# Patient Record
Sex: MALE | Race: WHITE | NOT HISPANIC OR LATINO
[De-identification: names, ages, dates, MRNs, and addresses within clinical notes are randomized per-mention and may not be internally consistent; named-entity substitution may affect disease eponyms.]

---

## 2017-04-18 ENCOUNTER — APPOINTMENT (OUTPATIENT)
Dept: ELECTROPHYSIOLOGY | Facility: CLINIC | Age: 82
End: 2017-04-18

## 2017-05-19 ENCOUNTER — APPOINTMENT (OUTPATIENT)
Dept: ELECTROPHYSIOLOGY | Facility: CLINIC | Age: 82
End: 2017-05-19

## 2017-05-19 ENCOUNTER — NON-APPOINTMENT (OUTPATIENT)
Age: 82
End: 2017-05-19

## 2017-05-19 VITALS
OXYGEN SATURATION: 95 % | DIASTOLIC BLOOD PRESSURE: 72 MMHG | HEART RATE: 85 BPM | HEIGHT: 72 IN | SYSTOLIC BLOOD PRESSURE: 125 MMHG | BODY MASS INDEX: 27.77 KG/M2 | WEIGHT: 205 LBS

## 2017-05-19 DIAGNOSIS — N40.0 BENIGN PROSTATIC HYPERPLASIA WITHOUT LOWER URINARY TRACT SYMPMS: ICD-10-CM

## 2017-05-19 DIAGNOSIS — K21.9 GASTRO-ESOPHAGEAL REFLUX DISEASE W/OUT ESOPHAGITIS: ICD-10-CM

## 2017-05-19 DIAGNOSIS — Z86.39 PERSONAL HISTORY OF OTHER ENDOCRINE, NUTRITIONAL AND METABOLIC DISEASE: ICD-10-CM

## 2017-05-19 DIAGNOSIS — R06.09 OTHER FORMS OF DYSPNEA: ICD-10-CM

## 2017-05-19 DIAGNOSIS — M19.90 UNSPECIFIED OSTEOARTHRITIS, UNSPECIFIED SITE: ICD-10-CM

## 2017-05-19 DIAGNOSIS — I48.1 PERSISTENT ATRIAL FIBRILLATION: ICD-10-CM

## 2017-05-19 RX ORDER — PANTOPRAZOLE SODIUM 40 MG/1
40 TABLET, DELAYED RELEASE ORAL DAILY
Qty: 30 | Refills: 0 | Status: ACTIVE | COMMUNITY
Start: 2017-05-19

## 2017-05-19 RX ORDER — MULTIVIT-MIN/FA/LYCOPEN/LUTEIN .4-300-25
TABLET ORAL DAILY
Qty: 30 | Refills: 0 | Status: ACTIVE | COMMUNITY
Start: 2017-05-19

## 2017-05-19 RX ORDER — ALBUTEROL SULFATE 90 UG/1
108 (90 BASE) AEROSOL, METERED RESPIRATORY (INHALATION)
Qty: 30 | Refills: 0 | Status: ACTIVE | COMMUNITY
Start: 2017-05-19

## 2017-05-19 RX ORDER — NITROGLYCERIN 0.4 MG/1
0.4 TABLET SUBLINGUAL
Qty: 30 | Refills: 0 | Status: ACTIVE | COMMUNITY
Start: 2017-05-19

## 2017-06-28 ENCOUNTER — OTHER (OUTPATIENT)
Age: 82
End: 2017-06-28

## 2017-08-16 ENCOUNTER — APPOINTMENT (OUTPATIENT)
Dept: CV DIAGNOSITCS | Facility: HOSPITAL | Age: 82
End: 2017-08-16
Payer: MEDICARE

## 2017-08-16 ENCOUNTER — OUTPATIENT (OUTPATIENT)
Dept: OUTPATIENT SERVICES | Facility: HOSPITAL | Age: 82
LOS: 1 days | End: 2017-08-16

## 2017-08-16 ENCOUNTER — APPOINTMENT (OUTPATIENT)
Dept: ELECTROPHYSIOLOGY | Facility: CLINIC | Age: 82
End: 2017-08-16
Payer: MEDICARE

## 2017-08-16 DIAGNOSIS — Z95.1 PRESENCE OF AORTOCORONARY BYPASS GRAFT: Chronic | ICD-10-CM

## 2017-08-16 DIAGNOSIS — Z98.61 CORONARY ANGIOPLASTY STATUS: Chronic | ICD-10-CM

## 2017-08-16 DIAGNOSIS — I48.1 PERSISTENT ATRIAL FIBRILLATION: ICD-10-CM

## 2017-08-16 PROCEDURE — 93312 ECHO TRANSESOPHAGEAL: CPT | Mod: 26

## 2017-08-16 PROCEDURE — 76376 3D RENDER W/INTRP POSTPROCES: CPT | Mod: 26

## 2017-08-16 PROCEDURE — 93306 TTE W/DOPPLER COMPLETE: CPT | Mod: 26

## 2017-08-16 PROCEDURE — 36415 COLL VENOUS BLD VENIPUNCTURE: CPT

## 2017-08-23 ENCOUNTER — INPATIENT (INPATIENT)
Facility: HOSPITAL | Age: 82
LOS: 0 days | Discharge: HOME | DRG: 274 | End: 2017-08-24
Attending: INTERNAL MEDICINE | Admitting: INTERNAL MEDICINE
Payer: MEDICARE

## 2017-08-23 ENCOUNTER — APPOINTMENT (OUTPATIENT)
Dept: CV DIAGNOSITCS | Facility: HOSPITAL | Age: 82
End: 2017-08-23

## 2017-08-23 VITALS
TEMPERATURE: 98 F | DIASTOLIC BLOOD PRESSURE: 72 MMHG | OXYGEN SATURATION: 97 % | SYSTOLIC BLOOD PRESSURE: 132 MMHG | RESPIRATION RATE: 16 BRPM | HEART RATE: 83 BPM | WEIGHT: 210.1 LBS | HEIGHT: 73 IN

## 2017-08-23 DIAGNOSIS — Z95.5 PRESENCE OF CORONARY ANGIOPLASTY IMPLANT AND GRAFT: Chronic | ICD-10-CM

## 2017-08-23 DIAGNOSIS — I48.1 PERSISTENT ATRIAL FIBRILLATION: ICD-10-CM

## 2017-08-23 DIAGNOSIS — Z90.49 ACQUIRED ABSENCE OF OTHER SPECIFIED PARTS OF DIGESTIVE TRACT: Chronic | ICD-10-CM

## 2017-08-23 DIAGNOSIS — Z98.61 CORONARY ANGIOPLASTY STATUS: Chronic | ICD-10-CM

## 2017-08-23 DIAGNOSIS — Z98.890 OTHER SPECIFIED POSTPROCEDURAL STATES: Chronic | ICD-10-CM

## 2017-08-23 DIAGNOSIS — Z95.1 PRESENCE OF AORTOCORONARY BYPASS GRAFT: Chronic | ICD-10-CM

## 2017-08-23 DIAGNOSIS — I49.9 CARDIAC ARRHYTHMIA, UNSPECIFIED: ICD-10-CM

## 2017-08-23 DIAGNOSIS — Z95.0 PRESENCE OF CARDIAC PACEMAKER: Chronic | ICD-10-CM

## 2017-08-23 DIAGNOSIS — E11.9 TYPE 2 DIABETES MELLITUS WITHOUT COMPLICATIONS: ICD-10-CM

## 2017-08-23 LAB
ALBUMIN SERPL ELPH-MCNC: 4.1 G/DL — SIGNIFICANT CHANGE UP (ref 3.3–5)
ALP SERPL-CCNC: 61 U/L — SIGNIFICANT CHANGE UP (ref 40–120)
ALT FLD-CCNC: 11 U/L RC — SIGNIFICANT CHANGE UP (ref 10–45)
ANION GAP SERPL CALC-SCNC: 13 MMOL/L — SIGNIFICANT CHANGE UP (ref 5–17)
APTT BLD: 37.4 SEC — SIGNIFICANT CHANGE UP (ref 27.5–37.4)
AST SERPL-CCNC: 20 U/L — SIGNIFICANT CHANGE UP (ref 10–40)
BASOPHILS # BLD AUTO: 0 K/UL — SIGNIFICANT CHANGE UP (ref 0–0.2)
BASOPHILS NFR BLD AUTO: 0.4 % — SIGNIFICANT CHANGE UP (ref 0–2)
BILIRUB SERPL-MCNC: 0.4 MG/DL — SIGNIFICANT CHANGE UP (ref 0.2–1.2)
BLD GP AB SCN SERPL QL: NEGATIVE — SIGNIFICANT CHANGE UP
BUN SERPL-MCNC: 29 MG/DL — HIGH (ref 7–23)
CALCIUM SERPL-MCNC: 8.8 MG/DL — SIGNIFICANT CHANGE UP (ref 8.4–10.5)
CHLORIDE SERPL-SCNC: 109 MMOL/L — HIGH (ref 96–108)
CO2 SERPL-SCNC: 22 MMOL/L — SIGNIFICANT CHANGE UP (ref 22–31)
CREAT SERPL-MCNC: 1.11 MG/DL — SIGNIFICANT CHANGE UP (ref 0.5–1.3)
EOSINOPHIL # BLD AUTO: 0.2 K/UL — SIGNIFICANT CHANGE UP (ref 0–0.5)
EOSINOPHIL NFR BLD AUTO: 3 % — SIGNIFICANT CHANGE UP (ref 0–6)
GLUCOSE SERPL-MCNC: 115 MG/DL — HIGH (ref 70–99)
HCT VFR BLD CALC: 32.1 % — LOW (ref 39–50)
HGB BLD-MCNC: 10.5 G/DL — LOW (ref 13–17)
INR BLD: 1.12 RATIO — SIGNIFICANT CHANGE UP (ref 0.88–1.16)
LYMPHOCYTES # BLD AUTO: 1.4 K/UL — SIGNIFICANT CHANGE UP (ref 1–3.3)
LYMPHOCYTES # BLD AUTO: 25.1 % — SIGNIFICANT CHANGE UP (ref 13–44)
MAGNESIUM SERPL-MCNC: 2.3 MG/DL — SIGNIFICANT CHANGE UP (ref 1.6–2.6)
MCHC RBC-ENTMCNC: 29.9 PG — SIGNIFICANT CHANGE UP (ref 27–34)
MCHC RBC-ENTMCNC: 32.7 GM/DL — SIGNIFICANT CHANGE UP (ref 32–36)
MCV RBC AUTO: 91.3 FL — SIGNIFICANT CHANGE UP (ref 80–100)
MONOCYTES # BLD AUTO: 0.5 K/UL — SIGNIFICANT CHANGE UP (ref 0–0.9)
MONOCYTES NFR BLD AUTO: 9 % — SIGNIFICANT CHANGE UP (ref 2–14)
NEUTROPHILS # BLD AUTO: 3.6 K/UL — SIGNIFICANT CHANGE UP (ref 1.8–7.4)
NEUTROPHILS NFR BLD AUTO: 62.6 % — SIGNIFICANT CHANGE UP (ref 43–77)
PHOSPHATE SERPL-MCNC: 4.9 MG/DL — HIGH (ref 2.5–4.5)
PLATELET # BLD AUTO: 156 K/UL — SIGNIFICANT CHANGE UP (ref 150–400)
POTASSIUM SERPL-MCNC: 4.2 MMOL/L — SIGNIFICANT CHANGE UP (ref 3.5–5.3)
POTASSIUM SERPL-SCNC: 4.2 MMOL/L — SIGNIFICANT CHANGE UP (ref 3.5–5.3)
PROT SERPL-MCNC: 6.5 G/DL — SIGNIFICANT CHANGE UP (ref 6–8.3)
PROTHROM AB SERPL-ACNC: 12.1 SEC — SIGNIFICANT CHANGE UP (ref 9.8–12.7)
RBC # BLD: 3.51 M/UL — LOW (ref 4.2–5.8)
RBC # FLD: 14.5 % — SIGNIFICANT CHANGE UP (ref 10.3–14.5)
RH IG SCN BLD-IMP: POSITIVE — SIGNIFICANT CHANGE UP
RH IG SCN BLD-IMP: POSITIVE — SIGNIFICANT CHANGE UP
SODIUM SERPL-SCNC: 144 MMOL/L — SIGNIFICANT CHANGE UP (ref 135–145)
WBC # BLD: 5.7 K/UL — SIGNIFICANT CHANGE UP (ref 3.8–10.5)
WBC # FLD AUTO: 5.7 K/UL — SIGNIFICANT CHANGE UP (ref 3.8–10.5)

## 2017-08-23 PROCEDURE — 71010: CPT | Mod: 26

## 2017-08-23 PROCEDURE — 93355 ECHO TRANSESOPHAGEAL (TEE): CPT

## 2017-08-23 PROCEDURE — 33340 PERQ CLSR TCAT L ATR APNDGE: CPT | Mod: Q0

## 2017-08-23 PROCEDURE — 93010 ELECTROCARDIOGRAM REPORT: CPT | Mod: 76

## 2017-08-23 RX ORDER — INSULIN LISPRO 100/ML
VIAL (ML) SUBCUTANEOUS
Qty: 0 | Refills: 0 | Status: DISCONTINUED | OUTPATIENT
Start: 2017-08-23 | End: 2017-08-24

## 2017-08-23 RX ORDER — DABIGATRAN ETEXILATE MESYLATE 150 MG/1
2 CAPSULE ORAL
Qty: 0 | Refills: 0 | COMMUNITY

## 2017-08-23 RX ORDER — BENZOCAINE AND MENTHOL 5; 1 G/100ML; G/100ML
1 LIQUID ORAL EVERY 4 HOURS
Qty: 0 | Refills: 0 | Status: DISCONTINUED | OUTPATIENT
Start: 2017-08-23 | End: 2017-08-24

## 2017-08-23 RX ORDER — HEPARIN SODIUM 5000 [USP'U]/ML
INJECTION INTRAVENOUS; SUBCUTANEOUS
Qty: 25000 | Refills: 0 | Status: DISCONTINUED | OUTPATIENT
Start: 2017-08-23 | End: 2017-08-24

## 2017-08-23 RX ORDER — INSULIN LISPRO 100/ML
VIAL (ML) SUBCUTANEOUS AT BEDTIME
Qty: 0 | Refills: 0 | Status: DISCONTINUED | OUTPATIENT
Start: 2017-08-23 | End: 2017-08-24

## 2017-08-23 RX ORDER — METOPROLOL TARTRATE 50 MG
25 TABLET ORAL DAILY
Qty: 0 | Refills: 0 | Status: DISCONTINUED | OUTPATIENT
Start: 2017-08-23 | End: 2017-08-24

## 2017-08-23 RX ORDER — PANTOPRAZOLE SODIUM 20 MG/1
40 TABLET, DELAYED RELEASE ORAL
Qty: 0 | Refills: 0 | Status: DISCONTINUED | OUTPATIENT
Start: 2017-08-23 | End: 2017-08-24

## 2017-08-23 RX ORDER — BUDESONIDE AND FORMOTEROL FUMARATE DIHYDRATE 160; 4.5 UG/1; UG/1
2 AEROSOL RESPIRATORY (INHALATION)
Qty: 0 | Refills: 0 | Status: DISCONTINUED | OUTPATIENT
Start: 2017-08-23 | End: 2017-08-24

## 2017-08-23 RX ORDER — ATORVASTATIN CALCIUM 80 MG/1
20 TABLET, FILM COATED ORAL AT BEDTIME
Qty: 0 | Refills: 0 | Status: DISCONTINUED | OUTPATIENT
Start: 2017-08-23 | End: 2017-08-24

## 2017-08-23 RX ORDER — ATORVASTATIN CALCIUM 80 MG/1
80 TABLET, FILM COATED ORAL AT BEDTIME
Qty: 0 | Refills: 0 | Status: DISCONTINUED | OUTPATIENT
Start: 2017-08-23 | End: 2017-08-23

## 2017-08-23 RX ORDER — TAMSULOSIN HYDROCHLORIDE 0.4 MG/1
0.4 CAPSULE ORAL AT BEDTIME
Qty: 0 | Refills: 0 | Status: DISCONTINUED | OUTPATIENT
Start: 2017-08-23 | End: 2017-08-24

## 2017-08-23 RX ORDER — SERTRALINE 25 MG/1
75 TABLET, FILM COATED ORAL DAILY
Qty: 0 | Refills: 0 | Status: DISCONTINUED | OUTPATIENT
Start: 2017-08-23 | End: 2017-08-24

## 2017-08-23 RX ORDER — VALSARTAN 80 MG/1
160 TABLET ORAL DAILY
Qty: 0 | Refills: 0 | Status: DISCONTINUED | OUTPATIENT
Start: 2017-08-24 | End: 2017-08-24

## 2017-08-23 RX ORDER — HEPARIN SODIUM 5000 [USP'U]/ML
5600 INJECTION INTRAVENOUS; SUBCUTANEOUS EVERY 6 HOURS
Qty: 0 | Refills: 0 | Status: DISCONTINUED | OUTPATIENT
Start: 2017-08-23 | End: 2017-08-24

## 2017-08-23 RX ORDER — ALBUTEROL 90 UG/1
2 AEROSOL, METERED ORAL EVERY 6 HOURS
Qty: 0 | Refills: 0 | Status: DISCONTINUED | OUTPATIENT
Start: 2017-08-23 | End: 2017-08-24

## 2017-08-23 RX ADMIN — TAMSULOSIN HYDROCHLORIDE 0.4 MILLIGRAM(S): 0.4 CAPSULE ORAL at 22:01

## 2017-08-23 RX ADMIN — BUDESONIDE AND FORMOTEROL FUMARATE DIHYDRATE 2 PUFF(S): 160; 4.5 AEROSOL RESPIRATORY (INHALATION) at 22:47

## 2017-08-23 RX ADMIN — HEPARIN SODIUM 1000 UNIT(S)/HR: 5000 INJECTION INTRAVENOUS; SUBCUTANEOUS at 22:01

## 2017-08-23 RX ADMIN — BENZOCAINE AND MENTHOL 1 LOZENGE: 5; 1 LIQUID ORAL at 22:25

## 2017-08-23 RX ADMIN — ATORVASTATIN CALCIUM 20 MILLIGRAM(S): 80 TABLET, FILM COATED ORAL at 22:01

## 2017-08-23 NOTE — H&P CARDIOLOGY - PMH
Coronary artery disease involving native coronary artery of native heart, angina presence unspecified    Hyperlipemia    Hypertension    MI, old    Pacemaker    Persistent atrial fibrillation    S/P coronary artery bypass graft x 3    Sepsis    Type 2 diabetes mellitus without complication, without long-term current use of insulin

## 2017-08-23 NOTE — PROGRESS NOTE ADULT - ASSESSMENT
85 y/o male PMH persistent Afib on Pradaxa, PPM, HTN, HLD, DM2, CAD (PCI 12/2016) & CAD (CABG x3), c/o exertional dyspnea on Ventolin and Symbicort, with history of fluctuating INRs on Coumadin and urinary and oral bleeding on Xarelto, Now s/p Watchman device

## 2017-08-23 NOTE — H&P CARDIOLOGY - PSH
Artificial cardiac pacemaker    H/O coronary angioplasty  4 stents  H/O prostate biopsy    History of cholecystectomy    S/P CABG x 3    Stented coronary artery

## 2017-08-23 NOTE — H&P CARDIOLOGY - HISTORY OF PRESENT ILLNESS
87 y/o male PMH persistent Afib on Pradaxa (last dose 8/22 AM), PPM - followed at [place], HTN, HLD, DM2 (A1C unknown, managed by PCP), CAD with PCI (most recent 12/2016) and history of 3V CABG (27 yrs ago at [place]), with history of fluctuating INRs on Coumadin and urinary and oral bleeding on NOAC, presents for Watchman device today. 85 y/o male PMH persistent Afib on Pradaxa (last dose 8/22 AM), PPM - followed at [place], HTN, HLD, DM2 (A1C unknown, managed by PCP), CAD with PCI (most recent 12/2016) and history of 3V CABG (27 yrs ago at [place]), c/o exertional dyspnea on Ventolin and Symbicort, with history of fluctuating INRs on Coumadin and urinary and oral bleeding on NOAC, presents for Watchman device today. 85 y/o male PMH persistent Afib on Pradaxa (last dose 8/22 AM), PPM - followed at [place], HTN, HLD, DM2 (A1C unknown, managed by PCP), CAD with PCI (most recent 12/2016) and history of 3V CABG (27 yrs ago at [place]), c/o exertional dyspnea on Ventolin and Symbicort, with history of fluctuating INRs on Coumadin and urinary and oral bleeding on NOAC, presents for Watchman device today.   MARLENE done 8/16/17 showed mild-mod MR, mild AR, mild aortic root dilatation, severely dilated LA, no left atrial appendage thrombus, moderate pulmonary HTN; EF 66%. 87 y/o male PMH persistent Afib on Pradaxa (last dose 8/21), PPM - followed at [place], HTN, HLD, DM2 (A1C unknown, managed by PCP), CAD with PCI (most recent 12/2016) and history of 3V CABG (27 yrs ago at [place]), c/o exertional dyspnea on Ventolin and Symbicort, with history of fluctuating INRs on Coumadin and urinary and oral bleeding on Xarelto, presents for Watchman device today.   MARLENE done 8/16/17 showed mild-mod MR, mild AR, mild aortic root dilatation, severely dilated LA, no left atrial appendage thrombus, moderate pulmonary HTN; EF 66%.

## 2017-08-23 NOTE — PROGRESS NOTE ADULT - SUBJECTIVE AND OBJECTIVE BOX
CCU Accept Note    Transfer from: EP lab    HPI: 85 y/o male PMH persistent Afib on Pradaxa (last dose 8/21), PPM - followed at [place], HTN, HLD, DM2 (A1C unknown, managed by PCP), CAD with PCI (most recent 12/2016) and history of 3V CABG (27 yrs ago at [place]), c/o exertional dyspnea on Ventolin and Symbicort, with history of fluctuating INRs on Coumadin and urinary and oral bleeding on Xarelto, presents for Watchman device today.   MARLENE done 8/16/17 showed mild-mod MR, mild AR, mild aortic root dilatation, severely dilated LA, no left atrial appendage thrombus, moderate pulmonary HTN; EF 66%. (23 Aug 2017 10:47)    Now s/p Watchman  Resting comfortably supine in bed A&O no complaints offered.    OBJECTIVE DATA:    Vital Signs Last 24 Hrs  T(C): 36.6 (23 Aug 2017 16:37), Max: 36.6 (23 Aug 2017 16:37)  T(F): 97.9 (23 Aug 2017 16:37), Max: 97.9 (23 Aug 2017 16:37)  HR: 68 (23 Aug 2017 18:07) (68 - 83)  BP: 110/66 (23 Aug 2017 18:07) (97/57 - 132/72)  BP(mean): 80 (23 Aug 2017 18:07) (72 - 92)  RR: 20 (23 Aug 2017 18:07) (12 - 20)  SpO2: 92% (23 Aug 2017 18:07) (92% - 97%)  I&O's Summary      Allergies:      MEDICATIONS  (STANDING):    MEDICATIONS  (PRN):      LABS        ( 08-23 @ 11:51 )   PT: 12.1 sec;   INR: 1.12 ratio  aPTT: 37.4 sec      PHYSICAL EXAM:  Constitutional: well developed well nourished, NAD  HEENT: NC/AT oral mucosa pink moist  Respiratory: regular unlabored bilat CTA diminished bases  Cardiovascular: irreg, irreg, S1 S2 no edema  Gastrointestinal: soft ND/NT + bowel sounds  Genitourinary: urine cath in place draining clear yellow urine  Extremities: BLACK equal strength  Vascular: warm peripherally  Neurological: A & O x3 mood & affect appropriate  Skin: warm dry, intact, no rash, cyanosis        TELEMETRY: a fib    Labs:    PT/INR - ( 23 Aug 2017 11:51 )   PT: 12.1 sec;   INR: 1.12 ratio         PTT - ( 23 Aug 2017 11:51 )  PTT:37.4 sec        HEALTH ISSUES - PROBLEM Dx: CCU Accept Note    Transfer from: EP lab    HPI: 87 y/o male PMH persistent Afib on Pradaxa (last dose 8/21), PPM - followed at [place], HTN, HLD, DM2 (A1C unknown, managed by PCP), CAD with PCI (most recent 12/2016) and history of 3V CABG (27 yrs ago at [place]), c/o exertional dyspnea on Ventolin and Symbicort, with history of fluctuating INRs on Coumadin and urinary and oral bleeding on Xarelto, presents for Watchman device today.   MARLENE done 8/16/17 showed mild-mod MR, mild AR, mild aortic root dilatation, severely dilated LA, no left atrial appendage thrombus, moderate pulmonary HTN; EF 66%. (23 Aug 2017 10:47)    Now s/p Watchman  Resting comfortably supine in bed A&O no complaints offered.    OBJECTIVE DATA:    Vital Signs Last 24 Hrs  T(C): 36.6 (23 Aug 2017 16:37), Max: 36.6 (23 Aug 2017 16:37)  T(F): 97.9 (23 Aug 2017 16:37), Max: 97.9 (23 Aug 2017 16:37)  HR: 68 (23 Aug 2017 18:07) (68 - 83)  BP: 110/66 (23 Aug 2017 18:07) (97/57 - 132/72)  BP(mean): 80 (23 Aug 2017 18:07) (72 - 92)  RR: 20 (23 Aug 2017 18:07) (12 - 20)  SpO2: 92% (23 Aug 2017 18:07) (92% - 97%)  I&O's Summary      Allergies:      MEDICATIONS  (STANDING):    MEDICATIONS  (PRN):      LABS        ( 08-23 @ 11:51 )   PT: 12.1 sec;   INR: 1.12 ratio  aPTT: 37.4 sec      PHYSICAL EXAM:  Constitutional: well developed well nourished, NAD  HEENT: NC/AT oral mucosa pink moist  Respiratory: regular unlabored bilat CTA diminished bases  Cardiovascular: irreg, irreg, S1 S2 no edema  Right groin access site: dressing D & I; soft no hematoma or ecchymosis  Gastrointestinal: soft ND/NT + bowel sounds  Genitourinary: due to void  Extremities: BLACK equal strength  Vascular: warm peripherally  Neurological: A & O x3 mood & affect appropriate  Skin: warm dry, intact, no rash, cyanosis        TELEMETRY: paced rhythm    Labs:    PT/INR - ( 23 Aug 2017 11:51 )   PT: 12.1 sec;   INR: 1.12 ratio         PTT - ( 23 Aug 2017 11:51 )  PTT:37.4 sec        HEALTH ISSUES - PROBLEM Dx: CCU Accept Note    Transfer from: EP lab    HPI: 87 y/o male PMH persistent Afib on Pradaxa (last dose 8/21), PPM - followed at [place], HTN, HLD, DM2 (A1C unknown, managed by PCP), CAD with PCI (most recent 12/2016) and history of 3V CABG (27 yrs ago at [place]), c/o exertional dyspnea on Ventolin and Symbicort, with history of fluctuating INRs on Coumadin and urinary and oral bleeding on Xarelto, presents for Watchman device today.   MARLENE done 8/16/17 showed mild-mod MR, mild AR, mild aortic root dilatation, severely dilated LA, no left atrial appendage thrombus, moderate pulmonary HTN; EF 66%. (23 Aug 2017 10:47)    Now s/p Watchman  Resting comfortably supine in bed A&O no complaints offered.    OBJECTIVE DATA:  Vital Signs Last 24 Hrs  T(C): 36.6 (23 Aug 2017 16:37), Max: 36.6 (23 Aug 2017 16:37)  T(F): 97.9 (23 Aug 2017 16:37), Max: 97.9 (23 Aug 2017 16:37)  HR: 68 (23 Aug 2017 18:07) (68 - 83)  BP: 110/66 (23 Aug 2017 18:07) (97/57 - 132/72)  BP(mean): 80 (23 Aug 2017 18:07) (72 - 92)  RR: 20 (23 Aug 2017 18:07) (12 - 20)  SpO2: 92% (23 Aug 2017 18:07) (92% - 97%)  I&O's Summary      Allergies:    MEDICATIONS  (STANDING):    MEDICATIONS  (PRN):    PHYSICAL EXAM:  Constitutional: well developed well nourished, NAD  HEENT: NC/AT oral mucosa pink moist  Respiratory: regular unlabored bilat CTA diminished bases  Cardiovascular: irreg, irreg, S1 S2 no edema  Right groin access site: dressing D & I; soft no hematoma or ecchymosis  Gastrointestinal: soft ND/NT + bowel sounds  Genitourinary: due to void  Extremities: BLACK equal strength  Vascular: warm peripherally  Neurological: A & O x3 mood & affect appropriate  Skin: warm dry, intact, no rash, cyanosis        TELEMETRY: paced rhythm    Labs:    PT/INR - ( 23 Aug 2017 11:51 )   PT: 12.1 sec;   INR: 1.12 ratio         PTT - ( 23 Aug 2017 11:51 )  PTT:37.4 sec        HEALTH ISSUES - PROBLEM Dx:

## 2017-08-23 NOTE — PROGRESS NOTE ADULT - PROBLEM SELECTOR PLAN 1
s/p watchman procedure  start Heparin 6 hours post procedure   monitor PTT to therapeutic  monitor groin site; bleeding  TTE in am  CXR

## 2017-08-24 ENCOUNTER — TRANSCRIPTION ENCOUNTER (OUTPATIENT)
Age: 82
End: 2017-08-24

## 2017-08-24 VITALS
SYSTOLIC BLOOD PRESSURE: 98 MMHG | OXYGEN SATURATION: 94 % | TEMPERATURE: 98 F | DIASTOLIC BLOOD PRESSURE: 60 MMHG | RESPIRATION RATE: 16 BRPM | HEART RATE: 68 BPM

## 2017-08-24 DIAGNOSIS — I10 ESSENTIAL (PRIMARY) HYPERTENSION: ICD-10-CM

## 2017-08-24 LAB
ALBUMIN SERPL ELPH-MCNC: 3.9 G/DL — SIGNIFICANT CHANGE UP (ref 3.3–5)
ALP SERPL-CCNC: 58 U/L — SIGNIFICANT CHANGE UP (ref 40–120)
ALT FLD-CCNC: 11 U/L RC — SIGNIFICANT CHANGE UP (ref 10–45)
ANION GAP SERPL CALC-SCNC: 10 MMOL/L — SIGNIFICANT CHANGE UP (ref 5–17)
APTT BLD: 65.7 SEC — HIGH (ref 27.5–37.4)
AST SERPL-CCNC: 19 U/L — SIGNIFICANT CHANGE UP (ref 10–40)
BASOPHILS # BLD AUTO: 0 K/UL — SIGNIFICANT CHANGE UP (ref 0–0.2)
BASOPHILS NFR BLD AUTO: 0 % — SIGNIFICANT CHANGE UP (ref 0–2)
BILIRUB SERPL-MCNC: 0.4 MG/DL — SIGNIFICANT CHANGE UP (ref 0.2–1.2)
BUN SERPL-MCNC: 29 MG/DL — HIGH (ref 7–23)
CALCIUM SERPL-MCNC: 8.9 MG/DL — SIGNIFICANT CHANGE UP (ref 8.4–10.5)
CHLORIDE SERPL-SCNC: 106 MMOL/L — SIGNIFICANT CHANGE UP (ref 96–108)
CO2 SERPL-SCNC: 25 MMOL/L — SIGNIFICANT CHANGE UP (ref 22–31)
CREAT SERPL-MCNC: 1.23 MG/DL — SIGNIFICANT CHANGE UP (ref 0.5–1.3)
EOSINOPHIL # BLD AUTO: 0.1 K/UL — SIGNIFICANT CHANGE UP (ref 0–0.5)
EOSINOPHIL NFR BLD AUTO: 1.8 % — SIGNIFICANT CHANGE UP (ref 0–6)
GLUCOSE SERPL-MCNC: 120 MG/DL — HIGH (ref 70–99)
HBA1C BLD-MCNC: 6.6 % — HIGH (ref 4–5.6)
HCT VFR BLD CALC: 32.4 % — LOW (ref 39–50)
HGB BLD-MCNC: 10.9 G/DL — LOW (ref 13–17)
LYMPHOCYTES # BLD AUTO: 1.2 K/UL — SIGNIFICANT CHANGE UP (ref 1–3.3)
LYMPHOCYTES # BLD AUTO: 17.5 % — SIGNIFICANT CHANGE UP (ref 13–44)
MAGNESIUM SERPL-MCNC: 2.2 MG/DL — SIGNIFICANT CHANGE UP (ref 1.6–2.6)
MCHC RBC-ENTMCNC: 30.4 PG — SIGNIFICANT CHANGE UP (ref 27–34)
MCHC RBC-ENTMCNC: 33.5 GM/DL — SIGNIFICANT CHANGE UP (ref 32–36)
MCV RBC AUTO: 90.7 FL — SIGNIFICANT CHANGE UP (ref 80–100)
MONOCYTES # BLD AUTO: 0.7 K/UL — SIGNIFICANT CHANGE UP (ref 0–0.9)
MONOCYTES NFR BLD AUTO: 10.4 % — SIGNIFICANT CHANGE UP (ref 2–14)
NEUTROPHILS # BLD AUTO: 4.9 K/UL — SIGNIFICANT CHANGE UP (ref 1.8–7.4)
NEUTROPHILS NFR BLD AUTO: 70.2 % — SIGNIFICANT CHANGE UP (ref 43–77)
PHOSPHATE SERPL-MCNC: 4.8 MG/DL — HIGH (ref 2.5–4.5)
PLATELET # BLD AUTO: 152 K/UL — SIGNIFICANT CHANGE UP (ref 150–400)
POTASSIUM SERPL-MCNC: 4.2 MMOL/L — SIGNIFICANT CHANGE UP (ref 3.5–5.3)
POTASSIUM SERPL-SCNC: 4.2 MMOL/L — SIGNIFICANT CHANGE UP (ref 3.5–5.3)
PROT SERPL-MCNC: 6.4 G/DL — SIGNIFICANT CHANGE UP (ref 6–8.3)
RBC # BLD: 3.58 M/UL — LOW (ref 4.2–5.8)
RBC # FLD: 14.4 % — SIGNIFICANT CHANGE UP (ref 10.3–14.5)
SODIUM SERPL-SCNC: 141 MMOL/L — SIGNIFICANT CHANGE UP (ref 135–145)
WBC # BLD: 7 K/UL — SIGNIFICANT CHANGE UP (ref 3.8–10.5)
WBC # FLD AUTO: 7 K/UL — SIGNIFICANT CHANGE UP (ref 3.8–10.5)

## 2017-08-24 PROCEDURE — 99238 HOSP IP/OBS DSCHRG MGMT 30/<: CPT

## 2017-08-24 PROCEDURE — 93308 TTE F-UP OR LMTD: CPT | Mod: 26

## 2017-08-24 PROCEDURE — 93321 DOPPLER ECHO F-UP/LMTD STD: CPT | Mod: 26

## 2017-08-24 PROCEDURE — 93010 ELECTROCARDIOGRAM REPORT: CPT

## 2017-08-24 RX ORDER — PRASUGREL 5 MG/1
5 TABLET, FILM COATED ORAL DAILY
Qty: 0 | Refills: 0 | Status: DISCONTINUED | OUTPATIENT
Start: 2017-08-24 | End: 2017-08-24

## 2017-08-24 RX ORDER — TAMSULOSIN HYDROCHLORIDE 0.4 MG/1
1 CAPSULE ORAL
Qty: 0 | Refills: 0 | COMMUNITY

## 2017-08-24 RX ORDER — TAMSULOSIN HYDROCHLORIDE 0.4 MG/1
0.8 CAPSULE ORAL AT BEDTIME
Qty: 0 | Refills: 0 | Status: DISCONTINUED | OUTPATIENT
Start: 2017-08-24 | End: 2017-08-24

## 2017-08-24 RX ORDER — DABIGATRAN ETEXILATE MESYLATE 150 MG/1
150 CAPSULE ORAL ONCE
Qty: 0 | Refills: 0 | Status: COMPLETED | OUTPATIENT
Start: 2017-08-24 | End: 2017-08-24

## 2017-08-24 RX ADMIN — PRASUGREL 5 MILLIGRAM(S): 5 TABLET, FILM COATED ORAL at 12:00

## 2017-08-24 RX ADMIN — DABIGATRAN ETEXILATE MESYLATE 150 MILLIGRAM(S): 150 CAPSULE ORAL at 12:00

## 2017-08-24 RX ADMIN — PANTOPRAZOLE SODIUM 40 MILLIGRAM(S): 20 TABLET, DELAYED RELEASE ORAL at 05:21

## 2017-08-24 RX ADMIN — BUDESONIDE AND FORMOTEROL FUMARATE DIHYDRATE 2 PUFF(S): 160; 4.5 AEROSOL RESPIRATORY (INHALATION) at 09:19

## 2017-08-24 RX ADMIN — SERTRALINE 75 MILLIGRAM(S): 25 TABLET, FILM COATED ORAL at 12:00

## 2017-08-24 RX ADMIN — HEPARIN SODIUM 1000 UNIT(S)/HR: 5000 INJECTION INTRAVENOUS; SUBCUTANEOUS at 05:22

## 2017-08-24 RX ADMIN — VALSARTAN 160 MILLIGRAM(S): 80 TABLET ORAL at 05:21

## 2017-08-24 RX ADMIN — Medication 25 MILLIGRAM(S): at 05:22

## 2017-08-24 NOTE — PROGRESS NOTE ADULT - PROBLEM SELECTOR PROBLEM 2
Type 2 diabetes mellitus without complication, without long-term current use of insulin
Hypertension
Type 2 diabetes mellitus without complication, without long-term current use of insulin

## 2017-08-24 NOTE — DISCHARGE NOTE ADULT - PATIENT PORTAL LINK FT
“You can access the FollowHealth Patient Portal, offered by [facility], by registering with the following website: [email or web address]”

## 2017-08-24 NOTE — DISCHARGE NOTE ADULT - HOSPITAL COURSE
85 y/o male PMH persistent Afib on Pradaxa (last dose 8/21), PPM - followed at [place], HTN, HLD, DM2 (A1C unknown, managed by PCP), CAD with PCI (most recent 12/2016) and history of 3V CABG (27 yrs ago at [place]), c/o exertional dyspnea on Ventolin and Symbicort, with history of fluctuating INRs on Coumadin and urinary and oral bleeding on Xarelto, presents for Watchman device today. MARLENE done 8/16/17 showed mild-mod MR, mild AR, mild aortic root dilatation, severely dilated LA, no left atrial appendage thrombus, moderate pulmonary HTN; EF 66%. (23 Aug 2017 10:47)  [place].  He remained in stable condition BP /50-70, MAP 60-90; paced rhythm; He was started on heparin infusion overnight.  Right groin remained stable, he ambulated without difficulty.  TTE in am showed no effusion.  He was restarted on Pradaxa.    He was discharged home in stable condition. 87 y/o male PMH persistent Afib on Pradaxa (last dose 8/21), PPM - followed at [place], HTN, HLD, DM2 (A1C unknown, managed by PCP), CAD with PCI (most recent 12/2016) and history of 3V CABG (27 yrs ago at [place]), c/o exertional dyspnea on Ventolin and Symbicort, with history of fluctuating INRs on Coumadin and urinary and oral bleeding on Xarelto, presents for Watchman device today. MARLENE done 8/16/17 showed mild-mod MR, mild AR, mild aortic root dilatation, severely dilated LA, no left atrial appendage thrombus, moderate pulmonary HTN; EF 66%. (23 Aug 2017 10:47)  Pt admitted to CCU [place]/p [place].  He remained in stable condition BP /50-70, MAP 60-90; paced rhythm; He was started on heparin infusion overnight.  Right groin remained stable, he ambulated without difficulty.  TTE in am showed no effusion.  He was restarted on his home medications, Heparin was d/c'd after TTE results and he was re started on Pradaxa.    He was discharged home in stable condition.

## 2017-08-24 NOTE — PROGRESS NOTE ADULT - PROBLEM SELECTOR PLAN 2
monitor glucose  diabetic DASH diet  HISS
Monitor VS  s/w home Toprol & Valsartan
monitor glucose  diabetic DASH diet  HISS

## 2017-08-24 NOTE — DISCHARGE NOTE ADULT - CARE PROVIDERS DIRECT ADDRESSES
[email or web address]

## 2017-08-24 NOTE — DISCHARGE NOTE ADULT - CARE PLAN
Principal Discharge DX:	Persistent atrial fibrillation  Instructions for follow-up, activity and diet:	No heavy lifting, strenuous activity, bending, straining or unnecessary stair climbing  for 2 weeks.  No driving for 2 days. You may shower 24 hours following procedure but avoid baths and swimming for 1 week. Check groin site for bleeding and/or swelling daily following procedure. Call your doctor/cardiologist immediately should it occur or if you have increased/persistent pain at the site. Follow up with your cardiologist in 1- 2 weeks. You may call [facility] at [phone] or [phone] after office hours and weekends  with any questions or concerns following your procedure. Take medications as prescribed.  Secondary Diagnosis:	Hypertension  Secondary Diagnosis:	CAD (coronary artery disease)  Secondary Diagnosis:	Type 2 diabetes mellitus without complication, without long-term current use of insulin Principal Discharge DX:	Persistent atrial fibrillation  Instructions for follow-up, activity and diet:	No heavy lifting, strenuous activity, bending, straining or unnecessary stair climbing  for 2 weeks.  No driving for 2 days. You may shower 24 hours following procedure but avoid baths and swimming for 1 week. Check groin site for bleeding and/or swelling daily following procedure. Call your doctor/cardiologist immediately should it occur or if you have increased/persistent pain at the site. Follow up with your cardiologist in 1- 2 weeks. You may call [facility] at [phone] or [phone] after office hours and weekends  with any questions or concerns following your procedure. Take medications as prescribed.  Secondary Diagnosis:	Hypertension  Goal:	BP will remain within normal limits  Instructions for follow-up, activity and diet:	Continue with your blood pressure medications; eat a heart healthy diet with low salt diet; exercise regularly (consult with your physician or cardiologist first); maintain a heart healthy weight; include healthy ways to manage stress. Continue to follow with your primary care physician or cardiologist.  Secondary Diagnosis:	CAD (coronary artery disease)  Goal:	will remain free of chest pain  Instructions for follow-up, activity and diet:	Continue Effient, Toprol, Valsartan, Crestor  Follow up with your cardiologist  Secondary Diagnosis:	Type 2 diabetes mellitus without complication, without long-term current use of insulin  Goal:	Glucose will remain within normal limits  Instructions for follow-up, activity and diet:	Continue to follow with your primary care MD or your endocrinologist.  Follow a heart healthy diabetic diet. If you check your fingerstick glucose at home, call your MD if it is greater than 250mg/dL on 2 occasions or less than 100mg/dL on 2 occasions. Know signs of low blood sugar, such as: dizziness, shakiness, sweating, confusion, hunger, nervousness-drink 4 ounces apple juice if occurs and call your doctor. Know early signs of high blood sugar, such as: frequent urination, increased thirst, blurry vision, fatigue, headache - call your doctor if this occurs. Follow with other practitioners to care for your diabetes, such as ophthamologist and podiatrist. Principal Discharge DX:	Persistent atrial fibrillation  Goal:	will remain rate controlled; without symptoms  Instructions for follow-up, activity and diet:	No heavy lifting, strenuous activity, bending, straining or unnecessary stair climbing  for 2 weeks.  No driving for 2 days. You may shower 24 hours following procedure but avoid baths and swimming for 1 week. Check groin site for bleeding and/or swelling daily following procedure. Call your doctor/cardiologist immediately should it occur or if you have increased/persistent pain at the site. Follow up with your cardiologist in 1- 2 weeks. You may call [facility] at [phone] or [phone] after office hours and weekends  with any questions or concerns following your procedure. Take medications as prescribed.  You are restarting Pradaxa. Pradaxa is a blood thinner and therefore you are at higher risk of bleeding. If you experience any signs of atrial fibrillation such as dizziness, fatigue, palpitations, or fainting please inform Dr. Gray as soon as possible or go to your nearest emergency room.  If you experience any signs of bleeding such as bleeding gums, bloody sputum, bleeding in your stool or black stool inform your primary care doctor;  Secondary Diagnosis:	Hypertension  Goal:	BP will remain within normal limits  Instructions for follow-up, activity and diet:	Continue with your blood pressure medications; eat a heart healthy diet with low salt diet; exercise regularly (consult with your physician or cardiologist first); maintain a heart healthy weight; include healthy ways to manage stress. Continue to follow with your primary care physician or cardiologist.  Secondary Diagnosis:	CAD (coronary artery disease)  Goal:	will remain free of chest pain  Instructions for follow-up, activity and diet:	Continue Effient, Toprol, Valsartan, Crestor  Follow up with your cardiologist  Secondary Diagnosis:	Type 2 diabetes mellitus without complication, without long-term current use of insulin  Goal:	Glucose will remain within normal limits  Instructions for follow-up, activity and diet:	Continue to follow with your primary care MD or your endocrinologist.  Follow a heart healthy diabetic diet. If you check your fingerstick glucose at home, call your MD if it is greater than 250mg/dL on 2 occasions or less than 100mg/dL on 2 occasions. Know signs of low blood sugar, such as: dizziness, shakiness, sweating, confusion, hunger, nervousness-drink 4 ounces apple juice if occurs and call your doctor. Know early signs of high blood sugar, such as: frequent urination, increased thirst, blurry vision, fatigue, headache - call your doctor if this occurs. Follow with other practitioners to care for your diabetes, such as ophthamologist and podiatrist. Principal Discharge DX:	Persistent atrial fibrillation  Goal:	will remain rate controlled; without symptoms  Instructions for follow-up, activity and diet:	No heavy lifting, strenuous activity, bending, straining or unnecessary stair climbing  for 2 weeks.  No driving for 2 days. You may shower 24 hours following procedure but avoid baths and swimming for 1 week. Check groin site for bleeding and/or swelling daily following procedure. Call your doctor/cardiologist immediately should it occur or if you have increased/persistent pain at the site. Follow up with your cardiologist in 1- 2 weeks. You may call [facility] at [phone] or [phone] after office hours and weekends  with any questions or concerns following your procedure. Take medications as prescribed.  You are restarting Pradaxa. Pradaxa is a blood thinner and therefore you are at higher risk of bleeding. If you experience any signs of atrial fibrillation such as dizziness, fatigue, palpitations, or fainting please inform Dr. Gray as soon as possible or go to your nearest emergency room.  If you experience any signs of bleeding such as bleeding gums, bloody sputum, bleeding in your stool or black stool inform your primary care doctor;  Secondary Diagnosis:	Hypertension  Goal:	BP will remain within normal limits  Instructions for follow-up, activity and diet:	Continue with your blood pressure medications; eat a heart healthy diet with low salt diet; exercise regularly (consult with your physician or cardiologist first); maintain a heart healthy weight; include healthy ways to manage stress. Continue to follow with your primary care physician or cardiologist.  Secondary Diagnosis:	CAD (coronary artery disease)  Goal:	will remain free of chest pain  Instructions for follow-up, activity and diet:	Continue Effient, Toprol, Valsartan, Crestor  Follow up with your cardiologist  Secondary Diagnosis:	Type 2 diabetes mellitus without complication, without long-term current use of insulin  Goal:	Glucose will remain within normal limits  Instructions for follow-up, activity and diet:	Continue to follow with your primary care MD or your endocrinologist.  Follow a heart healthy diabetic diet. If you check your fingerstick glucose at home, call your MD if it is greater than 250mg/dL on 2 occasions or less than 100mg/dL on 2 occasions. Know signs of low blood sugar, such as: dizziness, shakiness, sweating, confusion, hunger, nervousness-drink 4 ounces apple juice if occurs and call your doctor. Know early signs of high blood sugar, such as: frequent urination, increased thirst, blurry vision, fatigue, headache - call your doctor if this occurs. Follow with other practitioners to care for your diabetes, such as ophthalmologist and podiatrist. Principal Discharge DX:	Persistent atrial fibrillation  Goal:	will remain rate controlled; without symptoms  Instructions for follow-up, activity and diet:	No heavy lifting, strenuous activity, bending, straining or unnecessary stair climbing  for 2 weeks.  No driving for 2 days. You may shower 24 hours following procedure but avoid baths and swimming for 1 week. Check groin site for bleeding and/or swelling daily following procedure. Call your doctor/cardiologist immediately should it occur or if you have increased/persistent pain at the site. Follow up with your cardiologist in 1- 2 weeks. You may call [facility] at [phone] or [phone] after office hours and weekends  with any questions or concerns following your procedure. Take medications as prescribed.  You are restarting Pradaxa. Pradaxa is a blood thinner and therefore you are at higher risk of bleeding. If you experience any signs of atrial fibrillation such as dizziness, fatigue, palpitations, or fainting please inform Dr. Gray as soon as possible or go to your nearest emergency room.  If you experience any signs of bleeding such as bleeding gums, bloody sputum, bleeding in your stool or black stool inform your primary care doctor;  Secondary Diagnosis:	Hypertension  Goal:	BP will remain within normal limits  Instructions for follow-up, activity and diet:	Continue with your blood pressure medications; eat a heart healthy diet with low salt diet; exercise regularly (consult with your physician or cardiologist first); maintain a heart healthy weight; include healthy ways to manage stress. Continue to follow with your primary care physician or cardiologist.  Secondary Diagnosis:	CAD (coronary artery disease)  Goal:	will remain free of chest pain  Instructions for follow-up, activity and diet:	Continue Effient, Toprol, Valsartan, Crestor  Follow up with your cardiologist  Secondary Diagnosis:	Type 2 diabetes mellitus without complication, without long-term current use of insulin  Goal:	Glucose will remain within normal limits  Instructions for follow-up, activity and diet:	Continue to follow with your primary care MD or your endocrinologist.  Follow a heart healthy diabetic diet. If you check your fingerstick glucose at home, call your MD if it is greater than 250mg/dL on 2 occasions or less than 100mg/dL on 2 occasions. Know signs of low blood sugar, such as: dizziness, shakiness, sweating, confusion, hunger, nervousness-drink 4 ounces apple juice if occurs and call your doctor. Know early signs of high blood sugar, such as: frequent urination, increased thirst, blurry vision, fatigue, headache - call your doctor if this occurs. Follow with other practitioners to care for your diabetes, such as ophthalmologist and podiatrist.

## 2017-08-24 NOTE — PROGRESS NOTE ADULT - ATTENDING COMMENTS
85 yo M status-post Watchman placement. No effusion on TTE. Discharge planning on home dose of pradaxa. 87 yo M status-post Watchman placement. No effusion on TTE. Discharge planning. Restart home dose of pradaxa.

## 2017-08-24 NOTE — PROGRESS NOTE ADULT - SUBJECTIVE AND OBJECTIVE BOX
Post-Anesthetic Evaluation:    The Patient was interviewed and evaluated    Vital Signs Last 24 Hrs  T(C): 36.5 (24 Aug 2017 07:00), Max: 36.6 (23 Aug 2017 16:37)  T(F): 97.7 (24 Aug 2017 07:00), Max: 97.9 (23 Aug 2017 16:37)  HR: 68 (24 Aug 2017 07:00) (68 - 83)  BP: 116/53 (24 Aug 2017 07:00) (87/57 - 132/72)  BP(mean): 92 (24 Aug 2017 07:00) (68 - 92)  RR: 17 (24 Aug 2017 07:00) (12 - 27)  SpO2: 94% (24 Aug 2017 07:00) (92% - 99%)    Evaluation:      (X) No apparent complications or complaints regarding anesthesia care at this time  (X) All questions were answered    Condition:  () Stable      ( ) Guarded      (X ) Critical    Recommendations:  (X) None     ( ) Other:

## 2017-08-24 NOTE — DISCHARGE NOTE ADULT - SECONDARY DIAGNOSIS.
Hypertension CAD (coronary artery disease) Type 2 diabetes mellitus without complication, without long-term current use of insulin

## 2017-08-24 NOTE — DISCHARGE NOTE ADULT - INSTRUCTIONS
Heart healthy Heart healthy low fat no added salt follow up with your doctor, go to the emergency room if you have any chest pain or shortness of breath

## 2017-08-24 NOTE — PROGRESS NOTE ADULT - PROBLEM SELECTOR PLAN 1
-s/p watchman procedure on 8/23/17.  -f/u TTE and CXR this am.  -May resume Pradaxa if no pericardial effusion.  -Follow up with Dr. Gray as outpatient in 2 weeks.  -Discharge planning today after TTE. -s/p watchman procedure on 8/23/17.  -f/u TTE and CXR this am.  -May resume Pradaxa if no pericardial effusion.  -Post procedure restrictions enforced with pt.  -Follow up with Dr. Gray as outpatient in 2 weeks.  -Discharge planning today after TTE.

## 2017-08-24 NOTE — DISCHARGE NOTE ADULT - PLAN OF CARE
No heavy lifting, strenuous activity, bending, straining or unnecessary stair climbing  for 2 weeks.  No driving for 2 days. You may shower 24 hours following procedure but avoid baths and swimming for 1 week. Check groin site for bleeding and/or swelling daily following procedure. Call your doctor/cardiologist immediately should it occur or if you have increased/persistent pain at the site. Follow up with your cardiologist in 1- 2 weeks. You may call [facility] at [phone] or [phone] after office hours and weekends  with any questions or concerns following your procedure. Take medications as prescribed. BP will remain within normal limits Continue with your blood pressure medications; eat a heart healthy diet with low salt diet; exercise regularly (consult with your physician or cardiologist first); maintain a heart healthy weight; include healthy ways to manage stress. Continue to follow with your primary care physician or cardiologist. will remain free of chest pain Continue Effient, Toprol, Valsartan, Crestor  Follow up with your cardiologist Glucose will remain within normal limits Continue to follow with your primary care MD or your endocrinologist.  Follow a heart healthy diabetic diet. If you check your fingerstick glucose at home, call your MD if it is greater than 250mg/dL on 2 occasions or less than 100mg/dL on 2 occasions. Know signs of low blood sugar, such as: dizziness, shakiness, sweating, confusion, hunger, nervousness-drink 4 ounces apple juice if occurs and call your doctor. Know early signs of high blood sugar, such as: frequent urination, increased thirst, blurry vision, fatigue, headache - call your doctor if this occurs. Follow with other practitioners to care for your diabetes, such as ophthamologist and podiatrist. will remain rate controlled; without symptoms No heavy lifting, strenuous activity, bending, straining or unnecessary stair climbing  for 2 weeks.  No driving for 2 days. You may shower 24 hours following procedure but avoid baths and swimming for 1 week. Check groin site for bleeding and/or swelling daily following procedure. Call your doctor/cardiologist immediately should it occur or if you have increased/persistent pain at the site. Follow up with your cardiologist in 1- 2 weeks. You may call [facility] at [phone] or [phone] after office hours and weekends  with any questions or concerns following your procedure. Take medications as prescribed.  You are restarting Pradaxa. Pradaxa is a blood thinner and therefore you are at higher risk of bleeding. If you experience any signs of atrial fibrillation such as dizziness, fatigue, palpitations, or fainting please inform Dr. Gray as soon as possible or go to your nearest emergency room.  If you experience any signs of bleeding such as bleeding gums, bloody sputum, bleeding in your stool or black stool inform your primary care doctor; Continue to follow with your primary care MD or your endocrinologist.  Follow a heart healthy diabetic diet. If you check your fingerstick glucose at home, call your MD if it is greater than 250mg/dL on 2 occasions or less than 100mg/dL on 2 occasions. Know signs of low blood sugar, such as: dizziness, shakiness, sweating, confusion, hunger, nervousness-drink 4 ounces apple juice if occurs and call your doctor. Know early signs of high blood sugar, such as: frequent urination, increased thirst, blurry vision, fatigue, headache - call your doctor if this occurs. Follow with other practitioners to care for your diabetes, such as ophthalmologist and podiatrist.

## 2017-08-24 NOTE — PROGRESS NOTE ADULT - ASSESSMENT
85 y/o male PMH persistent Afib on Pradaxa, PPM, HTN, HLD, DM2, CAD (PCI 12/2016) & CAD (CABG x3), c/o exertional dyspnea on Ventolin and Symbicort, with history of fluctuating INRs on Coumadin and urinary and oral bleeding on Xarelto, Now s/p Watchman device on 8/23/17; clinically stable.

## 2017-08-24 NOTE — DISCHARGE NOTE ADULT - MEDICATION SUMMARY - MEDICATIONS TO TAKE
I will START or STAY ON the medications listed below when I get home from the hospital:    valsartan 160 mg oral tablet  -- 1 tab(s) by mouth once a day  -- Indication: For Hypertension    Flomax 0.4 mg oral capsule  -- 1 cap(s) by mouth 2 times a day  -- Indication: For H/O prostate biopsy    nitroglycerin 0.4 mg sublingual tablet  -- 1 tab(s) under tongue every 5 minutes, As Needed  -- Indication: For CAD (coronary artery disease)    Pradaxa 110 mg oral capsule  -- 1 cap(s) by mouth 2 times a day  -- Indication: For Persistent atrial fibrillation    Zoloft 50 mg oral tablet  -- 1.5 tab(s) by mouth once a day  -- Indication: For Anti depressant    Jardiance 10 mg oral tablet  -- 1 tab(s) by mouth once a day (in the morning)  -- Indication: For Diabetes type 2    Vascepa 1 g oral capsule  -- 2 cap(s) by mouth 2 times a day  -- Indication: For Hyperlipidemia    Crestor 20 mg oral tablet  -- 1 tab(s) by mouth once a day (at bedtime)  -- Indication: For Hyperlipidemia    Effient 5 mg oral tablet  -- 1 tab(s) by mouth once a day  -- Indication: For CAD (coronary artery disease) with stents    Toprol-XL 25 mg oral tablet, extended release  -- 1 tab(s) by mouth once a day  -- Indication: For CAD (coronary artery disease)    Ventolin HFA 90 mcg/inh inhalation aerosol  -- 2 puff(s) inhaled every 4 hours, As Needed  -- Indication: For bronchodilator    Symbicort  -- 2 puff(s) inhaled 2 times a day  -- Indication: For bronchodilator    Protonix 40 mg oral delayed release tablet  -- 1 tab(s) by mouth once a day  -- Indication: For Stomach protectant    Centrum Silver oral tablet  -- 1 tab(s) by mouth once a day  -- Indication: For vitamin supplement    Vitamin B-12 1000 mcg oral tablet  -- 1 tab(s) by mouth once a day  -- Indication: For vitamin supplement    Vitamin D3 1000 intl units oral capsule  -- 1 cap(s) by mouth once a day  -- Indication: For Vitamin supplement

## 2017-08-24 NOTE — CHART NOTE - NSCHARTNOTEFT_GEN_A_CORE
[facility] MIDNIGHT ROUNDS    ANNAMARIA AVALOS  [id number]  86y  Male    SUMMARY:    HPI:  85 y/o male PMH persistent Afib on Pradaxa (last dose 8/21), PPM - followed at [place], HTN, HLD, DM2 (A1C unknown, managed by PCP), CAD with PCI (most recent 12/2016) and history of 3V CABG (27 yrs ago at [place]), c/o exertional dyspnea on Ventolin and Symbicort, with history of fluctuating INRs on Coumadin and urinary and oral bleeding on Xarelto, presents for Watchman device today.   MARLENE done 8/16/17 showed mild-mod MR, mild AR, mild aortic root dilatation, severely dilated LA, no left atrial appendage thrombus, moderate pulmonary HTN; EF 66%. (23 Aug 2017 10:47)      NEW EVENTS: None      UPDATED VITALS:    ICU Vital Signs Last 24 Hrs  T(C): 36.4 (23 Aug 2017 19:50), Max: 36.6 (23 Aug 2017 16:37)  T(F): 97.6 (23 Aug 2017 19:50), Max: 97.9 (23 Aug 2017 16:37)  HR: 68 (24 Aug 2017 00:00) (68 - 83)  BP: 108/51 (24 Aug 2017 00:00) (87/57 - 132/72)  BP(mean): 76 (24 Aug 2017 00:00) (68 - 92)  ABP: --  ABP(mean): --  RR: 12 (24 Aug 2017 00:00) (12 - 27)  SpO2: 93% (24 Aug 2017 00:00) (92% - 99%)      I&O's Summary    23 Aug 2017 07:01  -  24 Aug 2017 01:08  --------------------------------------------------------  IN: 260 mL / OUT: 250 mL / NET: 10 mL            NEW LABS:                          10.5   5.7   )-----------( 156      ( 23 Aug 2017 19:42 )             32.1     08-23    144  |  109<H>  |  29<H>  ----------------------------<  115<H>  4.2   |  22  |  1.11    Ca    8.8      23 Aug 2017 19:42  Phos  4.9     08-23  Mg     2.3     08-23    TPro  6.5  /  Alb  4.1  /  TBili  0.4  /  DBili  x   /  AST  20  /  ALT  11  /  AlkPhos  61  08-23    PT/INR - ( 23 Aug 2017 11:51 )   PT: 12.1 sec;   INR: 1.12 ratio         PTT - ( 23 Aug 2017 11:51 )  PTT:37.4 sec            PLAN: 85 y/o male s/p watchman procedure  1)TTE in AM  2)CXR pending for watchman device  3) Pradaxa after echo in am if not effusion      DUSTIN James [id number]

## 2017-08-24 NOTE — PROGRESS NOTE ADULT - SUBJECTIVE AND OBJECTIVE BOX
Admission date: Aug 23; Hosp day #2  CHIEF COMPLAINT: afib for watchman  HPI:  87 y/o male PMH persistent Afib on Pradaxa (last dose ), PPM - followed at [place], HTN, HLD, DM2 (A1C unknown, managed by PCP), CAD with PCI (most recent 2016) and history of 3V CABG (27 yrs ago at [place]), c/o exertional dyspnea on Ventolin and Symbicort, with history of fluctuating INRs on Coumadin and urinary and oral bleeding on Xarelto, presents for Watchman device today.   MARLENE done 17 showed mild-mod MR, mild AR, mild aortic root dilatation, severely dilated LA, no left atrial appendage thrombus, moderate pulmonary HTN; EF 66%. (23 Aug 2017 10:47)    INTERVAL HISTORY: Now s/p watchman' Heparin infusion started overnight, no events  Resting comfortably in bed A & O No complaints offered    REVIEW OF SYSTEMS: Denies chest pain, SOB, dizziness, N V, all other negative    MEDICATIONS  (STANDING):  insulin lispro (HumaLOG) corrective regimen sliding scale   SubCutaneous three times a day before meals  insulin lispro (HumaLOG) corrective regimen sliding scale   SubCutaneous at bedtime  heparin  Infusion.  Unit(s)/Hr (10 mL/Hr) IV Continuous <Continuous>  metoprolol succinate ER 25 milliGRAM(s) Oral daily  sertraline 75 milliGRAM(s) Oral daily  valsartan 160 milliGRAM(s) Oral daily  pantoprazole    Tablet 40 milliGRAM(s) Oral before breakfast  buDESOnide  80 MICROgram(s)/formoterol 4.5 MICROgram(s) Inhaler 2 Puff(s) Inhalation two times a day  atorvastatin 20 milliGRAM(s) Oral at bedtime  tamsulosin 0.8 milliGRAM(s) Oral at bedtime    MEDICATIONS  (PRN):  heparin  Injectable 5600 Unit(s) IV Push every 6 hours PRN For aPTT less than 40  ALBUTerol    90 MICROgram(s) HFA Inhaler 2 Puff(s) Inhalation every 6 hours PRN Shortness of Breath  benzocaine 15 mG/menthol 3.6 mG Lozenge 1 Lozenge Oral every 4 hours PRN Sore Throat      Objective:  ICU Vital Signs Last 24 Hrs  T(C): 36.5 (24 Aug 2017 07:00), Max: 36.6 (23 Aug 2017 16:37)  T(F): 97.7 (24 Aug 2017 07:00), Max: 97.9 (23 Aug 2017 16:37)  HR: 68 (24 Aug 2017 07:00) (68 - 83)  BP: 116/53 (24 Aug 2017 07:00) (87/57 - 132/72)  BP(mean): 92 (24 Aug 2017 07:00) (68 - 92)  ABP: --  ABP(mean): --  RR: 17 (24 Aug 2017 07:00) (12 - 27)  SpO2: 94% (24 Aug 2017 07:00) (92% - 99%)       @ 07:01  -   @ 07:00  --------------------------------------------------------  IN: 340 mL / OUT: 1200 mL / NET: -860 mL      Daily Height in cm: 182.88 (23 Aug 2017 16:37)    Daily Weight in k.8 (24 Aug 2017 02:00)    PHYSICAL EXAM:      Constitutional:    HEENT:    Respiratory:    Cardiovascular:    Gastrointestinal:    Genitourinary:    Extremities:    Vascular:    Neurological:    Skin:      TELEMETRY:     EKG:     IMAGING:    Labs:                          10.9   7.0   )-----------( 152      ( 24 Aug 2017 04:27 )             32.4     08-24    141  |  106  |  29<H>  ----------------------------<  120<H>  4.2   |  25  |  1.23    Ca    8.9      24 Aug 2017 04:27  Phos  4.8     08-24  Mg     2.2     08-24    TPro  6.4  /  Alb  3.9  /  TBili  0.4  /  DBili  x   /  AST  19  /  ALT  11  /  AlkPhos  58  08-24    LIVER FUNCTIONS - ( 24 Aug 2017 04:27 )  Alb: 3.9 g/dL / Pro: 6.4 g/dL / ALK PHOS: 58 U/L / ALT: 11 U/L RC / AST: 19 U/L / GGT: x           PT/INR - ( 23 Aug 2017 11:51 )   PT: 12.1 sec;   INR: 1.12 ratio         PTT - ( 24 Aug 2017 04:27 )  PTT:65.7 sec        HEALTH ISSUES - PROBLEM Dx:  Type 2 diabetes mellitus without complication, without long-term current use of insulin: Type 2 diabetes mellitus without complication, without long-term current use of insulin  Persistent atrial fibrillation: Persistent atrial fibrillation Admission date: Aug 23; Hosp day #2  CHIEF COMPLAINT: afib for watchman  HPI:  87 y/o male PMH persistent Afib on Pradaxa (last dose ), PPM - followed at [place], HTN, HLD, DM2 (A1C unknown, managed by PCP), CAD with PCI (most recent 2016) and history of 3V CABG (27 yrs ago at [place]), c/o exertional dyspnea on Ventolin and Symbicort, with history of fluctuating INRs on Coumadin and urinary and oral bleeding on Xarelto, presents for Watchman device today.   MARLENE done 17 showed mild-mod MR, mild AR, mild aortic root dilatation, severely dilated LA, no left atrial appendage thrombus, moderate pulmonary HTN; EF 66%. (23 Aug 2017 10:47)    INTERVAL HISTORY: Now s/p watchman' Heparin infusion started overnight, no events  Resting comfortably in bed A & O No complaints offered    REVIEW OF SYSTEMS: Denies chest pain, SOB, dizziness, N V, all other negative    MEDICATIONS  (STANDING):  insulin lispro (HumaLOG) corrective regimen sliding scale   SubCutaneous three times a day before meals  insulin lispro (HumaLOG) corrective regimen sliding scale   SubCutaneous at bedtime  heparin  Infusion.  Unit(s)/Hr (10 mL/Hr) IV Continuous <Continuous>  metoprolol succinate ER 25 milliGRAM(s) Oral daily  sertraline 75 milliGRAM(s) Oral daily  valsartan 160 milliGRAM(s) Oral daily  pantoprazole    Tablet 40 milliGRAM(s) Oral before breakfast  buDESOnide  80 MICROgram(s)/formoterol 4.5 MICROgram(s) Inhaler 2 Puff(s) Inhalation two times a day  atorvastatin 20 milliGRAM(s) Oral at bedtime  tamsulosin 0.8 milliGRAM(s) Oral at bedtime    MEDICATIONS  (PRN):  heparin  Injectable 5600 Unit(s) IV Push every 6 hours PRN For aPTT less than 40  ALBUTerol    90 MICROgram(s) HFA Inhaler 2 Puff(s) Inhalation every 6 hours PRN Shortness of Breath  benzocaine 15 mG/menthol 3.6 mG Lozenge 1 Lozenge Oral every 4 hours PRN Sore Throat      Objective:  ICU Vital Signs Last 24 Hrs  T(C): 36.5 (24 Aug 2017 07:00), Max: 36.6 (23 Aug 2017 16:37)  T(F): 97.7 (24 Aug 2017 07:00), Max: 97.9 (23 Aug 2017 16:37)  HR: 68 (24 Aug 2017 07:00) (68 - 83)  BP: 116/53 (24 Aug 2017 07:00) (87/57 - 132/72)  BP(mean): 92 (24 Aug 2017 07:00) (68 - 92)  ABP: --  ABP(mean): --  RR: 17 (24 Aug 2017 07:00) (12 - 27)  SpO2: 94% (24 Aug 2017 07:00) (92% - 99%)       @ 07:01  -   @ 07:00  --------------------------------------------------------  IN: 340 mL / OUT: 1200 mL / NET: -860 mL      Daily Height in cm: 182.88 (23 Aug 2017 16:37)    Daily Weight in k.8 (24 Aug 2017 02:00)    PHYSICAL EXAM:  Constitutional: normal NAD  HEENT: NC/AT, sclera clear, oral mucosa pink moist  Respiratory: regular unlabored, CTA bilat  Cardiovascular:     Gastrointestinal:    Genitourinary:    Extremities:    Vascular:    Neurological:    Skin:      TELEMETRY:     EKG:     IMAGING:    Labs:                          10.9   7.0   )-----------( 152      ( 24 Aug 2017 04:27 )             32.4     -24    141  |  106  |  29<H>  ----------------------------<  120<H>  4.2   |  25  |  1.23    Ca    8.9      24 Aug 2017 04:27  Phos  4.8     24  Mg     2.2         TPro  6.4  /  Alb  3.9  /  TBili  0.4  /  DBili  x   /  AST  19  /  ALT  11  /  AlkPhos  58  0824    LIVER FUNCTIONS - ( 24 Aug 2017 04:27 )  Alb: 3.9 g/dL / Pro: 6.4 g/dL / ALK PHOS: 58 U/L / ALT: 11 U/L RC / AST: 19 U/L / GGT: x           PT/INR - ( 23 Aug 2017 11:51 )   PT: 12.1 sec;   INR: 1.12 ratio         PTT - ( 24 Aug 2017 04:27 )  PTT:65.7 sec        HEALTH ISSUES - PROBLEM Dx:  Type 2 diabetes mellitus without complication, without long-term current use of insulin: Type 2 diabetes mellitus without complication, without long-term current use of insulin  Persistent atrial fibrillation: Persistent atrial fibrillation Admission date: Aug 23; Hosp day #2  CHIEF COMPLAINT: afib for watchman  HPI:  85 y/o male PMH persistent Afib on Pradaxa (last dose ), PPM - followed at [place], HTN, HLD, DM2 (A1C unknown, managed by PCP), CAD with PCI (most recent 2016) and history of 3V CABG (27 yrs ago at [place]), c/o exertional dyspnea on Ventolin and Symbicort, with history of fluctuating INRs on Coumadin and urinary and oral bleeding on Xarelto, presents for Watchman device today.   MARLENE done 17 showed mild-mod MR, mild AR, mild aortic root dilatation, severely dilated LA, no left atrial appendage thrombus, moderate pulmonary HTN; EF 66%. (23 Aug 2017 10:47)    INTERVAL HISTORY: Now s/p watchman' Heparin infusion started overnight, no events  Resting comfortably in bed A & O No complaints offered    REVIEW OF SYSTEMS: Denies chest pain, SOB, dizziness, N V, all other negative    MEDICATIONS  (STANDING):  insulin lispro (HumaLOG) corrective regimen sliding scale   SubCutaneous three times a day before meals  insulin lispro (HumaLOG) corrective regimen sliding scale   SubCutaneous at bedtime  heparin  Infusion.  Unit(s)/Hr (10 mL/Hr) IV Continuous <Continuous>  metoprolol succinate ER 25 milliGRAM(s) Oral daily  sertraline 75 milliGRAM(s) Oral daily  valsartan 160 milliGRAM(s) Oral daily  pantoprazole    Tablet 40 milliGRAM(s) Oral before breakfast  buDESOnide  80 MICROgram(s)/formoterol 4.5 MICROgram(s) Inhaler 2 Puff(s) Inhalation two times a day  atorvastatin 20 milliGRAM(s) Oral at bedtime  tamsulosin 0.8 milliGRAM(s) Oral at bedtime    MEDICATIONS  (PRN):  heparin  Injectable 5600 Unit(s) IV Push every 6 hours PRN For aPTT less than 40  ALBUTerol    90 MICROgram(s) HFA Inhaler 2 Puff(s) Inhalation every 6 hours PRN Shortness of Breath  benzocaine 15 mG/menthol 3.6 mG Lozenge 1 Lozenge Oral every 4 hours PRN Sore Throat      Objective:  ICU Vital Signs Last 24 Hrs  T(C): 36.5 (24 Aug 2017 07:00), Max: 36.6 (23 Aug 2017 16:37)  T(F): 97.7 (24 Aug 2017 07:00), Max: 97.9 (23 Aug 2017 16:37)  HR: 68 (24 Aug 2017 07:00) (68 - 83)  BP: 116/53 (24 Aug 2017 07:00) (87/57 - 132/72)  BP(mean): 92 (24 Aug 2017 07:00) (68 - 92)  ABP: --  ABP(mean): --  RR: 17 (24 Aug 2017 07:00) (12 - 27)  SpO2: 94% (24 Aug 2017 07:00) (92% - 99%)       @ 07:01  -   @ 07:00  --------------------------------------------------------  IN: 340 mL / OUT: 1200 mL / NET: -860 mL      Daily Height in cm: 182.88 (23 Aug 2017 16:37)    Daily Weight in k.8 (24 Aug 2017 02:00)    PHYSICAL EXAM:  Constitutional: normal NAD  HEENT: NC/AT, sclera clear, oral mucosa pink moist  Respiratory: regular unlabored, CTA bilat  Cardiovascular: RRR S1 S2, no edema  Right groin access site - stable, dressing D & I; no hematoma or ecchymosis  Gastrointestinal: soft, ND/NT; + bowel sounds  Genitourinary: voiding on own  Extremities: BLACK equal strength  Vascular: warm peripherally  Neurological: A & O x4 mood & affect appropriate  Skin: warm dry intact, no rash, cyanosis or ecchymosis      TELEMETRY: paced rhythm        Labs:                        10.9   7.0   )-----------( 152      ( 24 Aug 2017 04:27 )             32.4     08-24    141  |  106  |  29<H>  ----------------------------<  120<H>  4.2   |  25  |  1.23    Ca    8.9      24 Aug 2017 04:27  Phos  4.8     08-24  Mg     2.2     08-24    TPro  6.4  /  Alb  3.9  /  TBili  0.4  /  DBili  x   /  AST  19  /  ALT  11  /  AlkPhos  58      LIVER FUNCTIONS - ( 24 Aug 2017 04:27 )  Alb: 3.9 g/dL / Pro: 6.4 g/dL / ALK PHOS: 58 U/L / ALT: 11 U/L RC / AST: 19 U/L / GGT: x           PT/INR - ( 23 Aug 2017 11:51 )   PT: 12.1 sec;   INR: 1.12 ratio         PTT - ( 24 Aug 2017 04:27 )  PTT:65.7 sec        HEALTH ISSUES - PROBLEM Dx:  Type 2 diabetes mellitus without complication, without long-term current use of insulin: Type 2 diabetes mellitus without complication, without long-term current use of insulin  Persistent atrial fibrillation: Persistent atrial fibrillation

## 2017-08-24 NOTE — DISCHARGE NOTE ADULT - ADDITIONAL INSTRUCTIONS
Call to schedule appointment with Dr Gray for within 1-2 weeks from discharge.  Follow up with your primary cardiologist

## 2017-08-24 NOTE — PROGRESS NOTE ADULT - SUBJECTIVE AND OBJECTIVE BOX
INTERVAL HPI/OVERNIGHT EVENTS: no acute events overnight     MEDICATIONS  (STANDING):  insulin lispro (HumaLOG) corrective regimen sliding scale   SubCutaneous three times a day before meals  insulin lispro (HumaLOG) corrective regimen sliding scale   SubCutaneous at bedtime  heparin  Infusion.  Unit(s)/Hr (10 mL/Hr) IV Continuous <Continuous>  metoprolol succinate ER 25 milliGRAM(s) Oral daily  sertraline 75 milliGRAM(s) Oral daily  valsartan 160 milliGRAM(s) Oral daily  pantoprazole    Tablet 40 milliGRAM(s) Oral before breakfast  buDESOnide  80 MICROgram(s)/formoterol 4.5 MICROgram(s) Inhaler 2 Puff(s) Inhalation two times a day  atorvastatin 20 milliGRAM(s) Oral at bedtime  tamsulosin 0.8 milliGRAM(s) Oral at bedtime    MEDICATIONS  (PRN):  heparin  Injectable 5600 Unit(s) IV Push every 6 hours PRN For aPTT less than 40  ALBUTerol    90 MICROgram(s) HFA Inhaler 2 Puff(s) Inhalation every 6 hours PRN Shortness of Breath  benzocaine 15 mG/menthol 3.6 mG Lozenge 1 Lozenge Oral every 4 hours PRN Sore Throat      Allergies    No Known Allergies      ROS:  General: Pt denies recent weight loss/fever/chills    Neurological: denies numbness or  sensation loss    HEENT: denies visual changes, no hearing loss, denies sore throat    Cardiovascular: denies chest pain/palpitations/leg edema    Respiratory and Thorax: denies SOB/cough/wheezing    Gastrointestinal: denies abdominal pain/diarrhea/constipation/bloody stool    Genitourinary: denies urinary frequency/urgency/ dysuria    Musculoskeletal: denies joint pain or swelling, denies restricted motion    Skin: denies rashes/sores      Vital Signs Last 24 Hrs  T(C): 36.5 (24 Aug 2017 07:00), Max: 36.6 (23 Aug 2017 16:37)  T(F): 97.7 (24 Aug 2017 07:00), Max: 97.9 (23 Aug 2017 16:37)  HR: 68 (24 Aug 2017 07:00) (68 - 83)  BP: 116/53 (24 Aug 2017 07:00) (87/57 - 132/72)  BP(mean): 92 (24 Aug 2017 07:00) (68 - 92)  RR: 17 (24 Aug 2017 07:00) (12 - 27)  SpO2: 94% (24 Aug 2017 07:00) (92% - 99%)      Physical Exam:    Constitutional: well developed, well nourished, no deformities and no acute distress    Neurological: Alert & Oriented x 3    HEENT: Neck supple, no JVD    Respiratory: CTA B/L, No wheezing/crackles/rhonchi    Cardiovascular: (+) S1 & S2, RRR    Gastrointestinal: soft, NT, nondistended, (+) BS    Genitourinary: non distended bladder, voiding freely    Extremities: No pedal edema, No clubbing, No cyanosis. Right groin surgical incision site clean, dry and gauze dressing intact, no hematoma noted.     Skin:  normal skin color and pigmentation, no skin lesions        LABS:                        10.9   7.0   )-----------( 152      ( 24 Aug 2017 04:27 )             32.4     08-24    141  |  106  |  29<H>  ----------------------------<  120<H>  4.2   |  25  |  1.23    Ca    8.9      24 Aug 2017 04:27  Phos  4.8     08-24  Mg     2.2     08-24    TPro  6.4  /  Alb  3.9  /  TBili  0.4  /  DBili  x   /  AST  19  /  ALT  11  /  AlkPhos  58  08-24    PT/INR - ( 23 Aug 2017 11:51 )   PT: 12.1 sec;   INR: 1.12 ratio         PTT - ( 24 Aug 2017 04:27 )  PTT:65.7 sec        TELE: V paced at 70 bpm    EKG: V paced

## 2017-08-24 NOTE — PROGRESS NOTE ADULT - ASSESSMENT
87 y/o male PMH persistent Afib; PPM, HTN, HLD, DM2, CAD (PCI 12/2016 & CABG x3 27 yrs prior), c/o exertional dyspnea on Ventolin and Symbicort, with history of fluctuating INRs on Coumadin and urinary and oral bleeding on Xarelto, No s/p watchman

## 2017-09-28 ENCOUNTER — OTHER (OUTPATIENT)
Age: 82
End: 2017-09-28

## 2017-10-12 ENCOUNTER — APPOINTMENT (OUTPATIENT)
Dept: CV DIAGNOSITCS | Facility: HOSPITAL | Age: 82
End: 2017-10-12
Payer: MEDICARE

## 2017-10-12 ENCOUNTER — OUTPATIENT (OUTPATIENT)
Dept: OUTPATIENT SERVICES | Facility: HOSPITAL | Age: 82
LOS: 1 days | End: 2017-10-12

## 2017-10-12 DIAGNOSIS — Z98.890 OTHER SPECIFIED POSTPROCEDURAL STATES: Chronic | ICD-10-CM

## 2017-10-12 DIAGNOSIS — Z90.49 ACQUIRED ABSENCE OF OTHER SPECIFIED PARTS OF DIGESTIVE TRACT: Chronic | ICD-10-CM

## 2017-10-12 DIAGNOSIS — Z98.61 CORONARY ANGIOPLASTY STATUS: Chronic | ICD-10-CM

## 2017-10-12 DIAGNOSIS — Z95.1 PRESENCE OF AORTOCORONARY BYPASS GRAFT: Chronic | ICD-10-CM

## 2017-10-12 DIAGNOSIS — I48.1 PERSISTENT ATRIAL FIBRILLATION: ICD-10-CM

## 2017-10-12 DIAGNOSIS — Z95.5 PRESENCE OF CORONARY ANGIOPLASTY IMPLANT AND GRAFT: Chronic | ICD-10-CM

## 2017-10-12 DIAGNOSIS — Z95.0 PRESENCE OF CARDIAC PACEMAKER: Chronic | ICD-10-CM

## 2017-10-12 PROCEDURE — 93325 DOPPLER ECHO COLOR FLOW MAPG: CPT | Mod: 26,GC

## 2017-10-12 PROCEDURE — 76376 3D RENDER W/INTRP POSTPROCES: CPT | Mod: 26

## 2017-10-12 PROCEDURE — 93320 DOPPLER ECHO COMPLETE: CPT | Mod: 26,GC

## 2017-10-12 PROCEDURE — 93312 ECHO TRANSESOPHAGEAL: CPT | Mod: 26

## 2017-10-17 RX ORDER — DABIGATRAN ETEXILATE MESYLATE 110 MG/1
110 CAPSULE ORAL
Qty: 30 | Refills: 0 | Status: DISCONTINUED | OUTPATIENT
Start: 2017-05-19 | End: 2017-10-17

## 2017-10-19 PROCEDURE — 80053 COMPREHEN METABOLIC PANEL: CPT

## 2017-10-19 PROCEDURE — 71045 X-RAY EXAM CHEST 1 VIEW: CPT

## 2017-10-19 PROCEDURE — 85730 THROMBOPLASTIN TIME PARTIAL: CPT

## 2017-10-19 PROCEDURE — 85027 COMPLETE CBC AUTOMATED: CPT

## 2017-10-19 PROCEDURE — 86901 BLOOD TYPING SEROLOGIC RH(D): CPT

## 2017-10-19 PROCEDURE — 93355 ECHO TRANSESOPHAGEAL (TEE): CPT

## 2017-10-19 PROCEDURE — 84100 ASSAY OF PHOSPHORUS: CPT

## 2017-10-19 PROCEDURE — C1894: CPT

## 2017-10-19 PROCEDURE — 93005 ELECTROCARDIOGRAM TRACING: CPT

## 2017-10-19 PROCEDURE — 86900 BLOOD TYPING SEROLOGIC ABO: CPT

## 2017-10-19 PROCEDURE — 94640 AIRWAY INHALATION TREATMENT: CPT

## 2017-10-19 PROCEDURE — 83735 ASSAY OF MAGNESIUM: CPT

## 2017-10-19 PROCEDURE — 33340 PERQ CLSR TCAT L ATR APNDGE: CPT | Mod: Q0

## 2017-10-19 PROCEDURE — C1889: CPT

## 2017-10-19 PROCEDURE — 83036 HEMOGLOBIN GLYCOSYLATED A1C: CPT

## 2017-10-19 PROCEDURE — 93308 TTE F-UP OR LMTD: CPT

## 2017-10-19 PROCEDURE — 93321 DOPPLER ECHO F-UP/LMTD STD: CPT

## 2017-10-19 PROCEDURE — 85610 PROTHROMBIN TIME: CPT

## 2017-10-19 PROCEDURE — C1893: CPT

## 2017-10-19 PROCEDURE — 86850 RBC ANTIBODY SCREEN: CPT

## 2018-08-03 ENCOUNTER — OTHER (OUTPATIENT)
Age: 83
End: 2018-08-03

## 2019-04-18 NOTE — DISCHARGE NOTE ADULT - CARE PROVIDER_API CALL
Tolerating clear liquids today after abdominal ultrasound completed.  Oxycodone 5 mg given with relief for left upper quadrant abdominal pain after ultrasound. Voiding clear yellow urine today. IV fluids and antibiotics as ordered. Temperature max this shift 99.6. Moves in bed from side to side, sits on edge of bed independently. Declines getting out of bed to chair or going for walk in mosher.    Weston Gray), Cardiac Electrophysiology; Cardiovascular Disease; Internal Medicine  [address] [address] [place] [address]  [address] [address]  [place] [address]  Phone: [phone]  Fax: [phone]    Elpidio Mcnamara), Cardiology; Internal Medicine  [address] [facility] [address]  [place] [address]  Phone: [phone]  Fax: [phone]

## 2019-08-16 NOTE — PATIENT PROFILE ADULT. - TEACHING/LEARNING LEARNING PREFERENCES
You can access the [facility], offered by [facility], by registering with the following website: [email or web address] verbal instruction/written material

## 2020-02-20 ENCOUNTER — INPATIENT (INPATIENT)
Facility: HOSPITAL | Age: 85
LOS: 5 days | Discharge: HOME | DRG: 193 | End: 2020-02-26
Attending: HOSPITALIST | Admitting: FAMILY MEDICINE
Payer: MEDICARE

## 2020-02-20 VITALS
HEART RATE: 94 BPM | RESPIRATION RATE: 20 BRPM | TEMPERATURE: 98 F | SYSTOLIC BLOOD PRESSURE: 124 MMHG | WEIGHT: 199.96 LBS | DIASTOLIC BLOOD PRESSURE: 63 MMHG | OXYGEN SATURATION: 99 %

## 2020-02-20 DIAGNOSIS — Z29.9 ENCOUNTER FOR PROPHYLACTIC MEASURES, UNSPECIFIED: ICD-10-CM

## 2020-02-20 DIAGNOSIS — E78.5 HYPERLIPIDEMIA, UNSPECIFIED: ICD-10-CM

## 2020-02-20 DIAGNOSIS — I50.9 HEART FAILURE, UNSPECIFIED: ICD-10-CM

## 2020-02-20 DIAGNOSIS — Z90.49 ACQUIRED ABSENCE OF OTHER SPECIFIED PARTS OF DIGESTIVE TRACT: Chronic | ICD-10-CM

## 2020-02-20 DIAGNOSIS — J44.1 CHRONIC OBSTRUCTIVE PULMONARY DISEASE WITH (ACUTE) EXACERBATION: ICD-10-CM

## 2020-02-20 DIAGNOSIS — Z95.0 PRESENCE OF CARDIAC PACEMAKER: Chronic | ICD-10-CM

## 2020-02-20 DIAGNOSIS — Z95.818 PRESENCE OF OTHER CARDIAC IMPLANTS AND GRAFTS: Chronic | ICD-10-CM

## 2020-02-20 DIAGNOSIS — I48.19 OTHER PERSISTENT ATRIAL FIBRILLATION: ICD-10-CM

## 2020-02-20 DIAGNOSIS — R06.00 DYSPNEA, UNSPECIFIED: ICD-10-CM

## 2020-02-20 DIAGNOSIS — Z98.890 OTHER SPECIFIED POSTPROCEDURAL STATES: Chronic | ICD-10-CM

## 2020-02-20 DIAGNOSIS — Z98.61 CORONARY ANGIOPLASTY STATUS: Chronic | ICD-10-CM

## 2020-02-20 DIAGNOSIS — I10 ESSENTIAL (PRIMARY) HYPERTENSION: ICD-10-CM

## 2020-02-20 DIAGNOSIS — D64.9 ANEMIA, UNSPECIFIED: ICD-10-CM

## 2020-02-20 DIAGNOSIS — Z95.1 PRESENCE OF AORTOCORONARY BYPASS GRAFT: Chronic | ICD-10-CM

## 2020-02-20 DIAGNOSIS — Z95.5 PRESENCE OF CORONARY ANGIOPLASTY IMPLANT AND GRAFT: Chronic | ICD-10-CM

## 2020-02-20 DIAGNOSIS — J18.9 PNEUMONIA, UNSPECIFIED ORGANISM: ICD-10-CM

## 2020-02-20 PROBLEM — E11.9 TYPE 2 DIABETES MELLITUS WITHOUT COMPLICATIONS: Chronic | Status: ACTIVE | Noted: 2017-08-23

## 2020-02-20 PROBLEM — I25.10 ATHEROSCLEROTIC HEART DISEASE OF NATIVE CORONARY ARTERY WITHOUT ANGINA PECTORIS: Chronic | Status: ACTIVE | Noted: 2017-08-23

## 2020-02-20 LAB
ALBUMIN SERPL ELPH-MCNC: 3.4 G/DL — SIGNIFICANT CHANGE UP (ref 3.3–5)
ALP SERPL-CCNC: 46 U/L — SIGNIFICANT CHANGE UP (ref 40–120)
ALT FLD-CCNC: 15 U/L — SIGNIFICANT CHANGE UP (ref 12–78)
ANION GAP SERPL CALC-SCNC: 10 MMOL/L — SIGNIFICANT CHANGE UP (ref 5–17)
APPEARANCE UR: CLEAR — SIGNIFICANT CHANGE UP
APTT BLD: 30.6 SEC — SIGNIFICANT CHANGE UP (ref 28.5–37)
AST SERPL-CCNC: 19 U/L — SIGNIFICANT CHANGE UP (ref 15–37)
BACTERIA # UR AUTO: ABNORMAL
BASE EXCESS BLDV CALC-SCNC: -3.4 MMOL/L — LOW (ref -2–2)
BASOPHILS # BLD AUTO: 0.02 K/UL — SIGNIFICANT CHANGE UP (ref 0–0.2)
BASOPHILS NFR BLD AUTO: 0.3 % — SIGNIFICANT CHANGE UP (ref 0–2)
BILIRUB SERPL-MCNC: 0.6 MG/DL — SIGNIFICANT CHANGE UP (ref 0.2–1.2)
BILIRUB UR-MCNC: NEGATIVE — SIGNIFICANT CHANGE UP
BLOOD GAS COMMENTS, VENOUS: SIGNIFICANT CHANGE UP
BUN SERPL-MCNC: 44 MG/DL — HIGH (ref 7–23)
CALCIUM SERPL-MCNC: 9.3 MG/DL — SIGNIFICANT CHANGE UP (ref 8.5–10.1)
CHLORIDE SERPL-SCNC: 104 MMOL/L — SIGNIFICANT CHANGE UP (ref 96–108)
CO2 SERPL-SCNC: 24 MMOL/L — SIGNIFICANT CHANGE UP (ref 22–31)
COLOR SPEC: YELLOW — SIGNIFICANT CHANGE UP
CREAT SERPL-MCNC: 1.7 MG/DL — HIGH (ref 0.5–1.3)
CRP SERPL-MCNC: 1.18 MG/DL — HIGH (ref 0–0.4)
DIFF PNL FLD: ABNORMAL
EOSINOPHIL # BLD AUTO: 0.01 K/UL — SIGNIFICANT CHANGE UP (ref 0–0.5)
EOSINOPHIL NFR BLD AUTO: 0.2 % — SIGNIFICANT CHANGE UP (ref 0–6)
EPI CELLS # UR: SIGNIFICANT CHANGE UP
FLU A RESULT: SIGNIFICANT CHANGE UP
FLU A RESULT: SIGNIFICANT CHANGE UP
FLUAV AG NPH QL: SIGNIFICANT CHANGE UP
FLUBV AG NPH QL: SIGNIFICANT CHANGE UP
GLUCOSE SERPL-MCNC: 155 MG/DL — HIGH (ref 70–99)
GLUCOSE UR QL: NEGATIVE — SIGNIFICANT CHANGE UP
HCO3 BLDV-SCNC: 22 MMOL/L — SIGNIFICANT CHANGE UP (ref 21–29)
HCT VFR BLD CALC: 25.6 % — LOW (ref 39–50)
HGB BLD-MCNC: 8.4 G/DL — LOW (ref 13–17)
HOROWITZ INDEX BLDV+IHG-RTO: 21 — SIGNIFICANT CHANGE UP
IMM GRANULOCYTES NFR BLD AUTO: 0.3 % — SIGNIFICANT CHANGE UP (ref 0–1.5)
INR BLD: 1.28 RATIO — HIGH (ref 0.88–1.16)
KETONES UR-MCNC: NEGATIVE — SIGNIFICANT CHANGE UP
LACTATE SERPL-SCNC: 1.3 MMOL/L — SIGNIFICANT CHANGE UP (ref 0.7–2)
LEUKOCYTE ESTERASE UR-ACNC: NEGATIVE — SIGNIFICANT CHANGE UP
LYMPHOCYTES # BLD AUTO: 0.57 K/UL — LOW (ref 1–3.3)
LYMPHOCYTES # BLD AUTO: 9.4 % — LOW (ref 13–44)
MCHC RBC-ENTMCNC: 30.3 PG — SIGNIFICANT CHANGE UP (ref 27–34)
MCHC RBC-ENTMCNC: 32.8 GM/DL — SIGNIFICANT CHANGE UP (ref 32–36)
MCV RBC AUTO: 92.4 FL — SIGNIFICANT CHANGE UP (ref 80–100)
MONOCYTES # BLD AUTO: 0.71 K/UL — SIGNIFICANT CHANGE UP (ref 0–0.9)
MONOCYTES NFR BLD AUTO: 11.8 % — SIGNIFICANT CHANGE UP (ref 2–14)
NEUTROPHILS # BLD AUTO: 4.71 K/UL — SIGNIFICANT CHANGE UP (ref 1.8–7.4)
NEUTROPHILS NFR BLD AUTO: 78 % — HIGH (ref 43–77)
NITRITE UR-MCNC: NEGATIVE — SIGNIFICANT CHANGE UP
NRBC # BLD: 0 /100 WBCS — SIGNIFICANT CHANGE UP (ref 0–0)
NT-PROBNP SERPL-SCNC: 4565 PG/ML — HIGH (ref 0–450)
PCO2 BLDV: 32 MMHG — LOW (ref 35–50)
PH BLDV: 7.42 — SIGNIFICANT CHANGE UP (ref 7.35–7.45)
PH UR: 5 — SIGNIFICANT CHANGE UP (ref 5–8)
PLATELET # BLD AUTO: 110 K/UL — LOW (ref 150–400)
PO2 BLDV: 163 MMHG — HIGH (ref 25–45)
POTASSIUM SERPL-MCNC: 4.4 MMOL/L — SIGNIFICANT CHANGE UP (ref 3.5–5.3)
POTASSIUM SERPL-SCNC: 4.4 MMOL/L — SIGNIFICANT CHANGE UP (ref 3.5–5.3)
PROT SERPL-MCNC: 6.9 G/DL — SIGNIFICANT CHANGE UP (ref 6–8.3)
PROT UR-MCNC: 15
PROTHROM AB SERPL-ACNC: 14.5 SEC — HIGH (ref 10–12.9)
RBC # BLD: 2.77 M/UL — LOW (ref 4.2–5.8)
RBC # FLD: 14.6 % — HIGH (ref 10.3–14.5)
RBC CASTS # UR COMP ASSIST: SIGNIFICANT CHANGE UP /HPF (ref 0–4)
RSV RESULT: SIGNIFICANT CHANGE UP
RSV RNA RESP QL NAA+PROBE: SIGNIFICANT CHANGE UP
SAO2 % BLDV: 100 % — HIGH (ref 67–88)
SODIUM SERPL-SCNC: 138 MMOL/L — SIGNIFICANT CHANGE UP (ref 135–145)
SP GR SPEC: 1.01 — SIGNIFICANT CHANGE UP (ref 1.01–1.02)
UROBILINOGEN FLD QL: NEGATIVE — SIGNIFICANT CHANGE UP
WBC # BLD: 6.04 K/UL — SIGNIFICANT CHANGE UP (ref 3.8–10.5)
WBC # FLD AUTO: 6.04 K/UL — SIGNIFICANT CHANGE UP (ref 3.8–10.5)
WBC UR QL: SIGNIFICANT CHANGE UP

## 2020-02-20 PROCEDURE — 71045 X-RAY EXAM CHEST 1 VIEW: CPT | Mod: 26

## 2020-02-20 PROCEDURE — 76775 US EXAM ABDO BACK WALL LIM: CPT | Mod: 26

## 2020-02-20 PROCEDURE — 99223 1ST HOSP IP/OBS HIGH 75: CPT | Mod: AI

## 2020-02-20 PROCEDURE — 93010 ELECTROCARDIOGRAM REPORT: CPT

## 2020-02-20 PROCEDURE — 76770 US EXAM ABDO BACK WALL COMP: CPT | Mod: 26

## 2020-02-20 PROCEDURE — 99285 EMERGENCY DEPT VISIT HI MDM: CPT

## 2020-02-20 PROCEDURE — 71250 CT THORAX DX C-: CPT | Mod: 26

## 2020-02-20 PROCEDURE — 99222 1ST HOSP IP/OBS MODERATE 55: CPT | Mod: GC

## 2020-02-20 RX ORDER — DEXTROSE 50 % IN WATER 50 %
12.5 SYRINGE (ML) INTRAVENOUS ONCE
Refills: 0 | Status: DISCONTINUED | OUTPATIENT
Start: 2020-02-20 | End: 2020-02-26

## 2020-02-20 RX ORDER — CARVEDILOL PHOSPHATE 80 MG/1
25 CAPSULE, EXTENDED RELEASE ORAL EVERY 12 HOURS
Refills: 0 | Status: DISCONTINUED | OUTPATIENT
Start: 2020-02-20 | End: 2020-02-26

## 2020-02-20 RX ORDER — VALSARTAN 80 MG/1
1 TABLET ORAL
Qty: 0 | Refills: 0 | DISCHARGE

## 2020-02-20 RX ORDER — METFORMIN HYDROCHLORIDE 850 MG/1
1 TABLET ORAL
Qty: 0 | Refills: 0 | DISCHARGE

## 2020-02-20 RX ORDER — SODIUM CHLORIDE 9 MG/ML
1000 INJECTION, SOLUTION INTRAVENOUS
Refills: 0 | Status: DISCONTINUED | OUTPATIENT
Start: 2020-02-20 | End: 2020-02-26

## 2020-02-20 RX ORDER — EMPAGLIFLOZIN 10 MG/1
1 TABLET, FILM COATED ORAL
Qty: 0 | Refills: 0 | DISCHARGE

## 2020-02-20 RX ORDER — AZITHROMYCIN 500 MG/1
500 TABLET, FILM COATED ORAL ONCE
Refills: 0 | Status: COMPLETED | OUTPATIENT
Start: 2020-02-20 | End: 2020-02-20

## 2020-02-20 RX ORDER — SODIUM CHLORIDE 9 MG/ML
1000 INJECTION INTRAMUSCULAR; INTRAVENOUS; SUBCUTANEOUS ONCE
Refills: 0 | Status: COMPLETED | OUTPATIENT
Start: 2020-02-20 | End: 2020-02-20

## 2020-02-20 RX ORDER — CEFTRIAXONE 500 MG/1
1000 INJECTION, POWDER, FOR SOLUTION INTRAMUSCULAR; INTRAVENOUS EVERY 24 HOURS
Refills: 0 | Status: DISCONTINUED | OUTPATIENT
Start: 2020-02-20 | End: 2020-02-20

## 2020-02-20 RX ORDER — GLUCAGON INJECTION, SOLUTION 0.5 MG/.1ML
1 INJECTION, SOLUTION SUBCUTANEOUS ONCE
Refills: 0 | Status: DISCONTINUED | OUTPATIENT
Start: 2020-02-20 | End: 2020-02-26

## 2020-02-20 RX ORDER — DEXTROSE 50 % IN WATER 50 %
25 SYRINGE (ML) INTRAVENOUS ONCE
Refills: 0 | Status: DISCONTINUED | OUTPATIENT
Start: 2020-02-20 | End: 2020-02-26

## 2020-02-20 RX ORDER — DABIGATRAN ETEXILATE MESYLATE 150 MG/1
1 CAPSULE ORAL
Qty: 0 | Refills: 0 | DISCHARGE

## 2020-02-20 RX ORDER — CEFTRIAXONE 500 MG/1
1000 INJECTION, POWDER, FOR SOLUTION INTRAMUSCULAR; INTRAVENOUS EVERY 24 HOURS
Refills: 0 | Status: DISCONTINUED | OUTPATIENT
Start: 2020-02-21 | End: 2020-02-24

## 2020-02-20 RX ORDER — PANTOPRAZOLE SODIUM 20 MG/1
40 TABLET, DELAYED RELEASE ORAL
Refills: 0 | Status: DISCONTINUED | OUTPATIENT
Start: 2020-02-20 | End: 2020-02-26

## 2020-02-20 RX ORDER — ASPIRIN/CALCIUM CARB/MAGNESIUM 324 MG
81 TABLET ORAL DAILY
Refills: 0 | Status: DISCONTINUED | OUTPATIENT
Start: 2020-02-20 | End: 2020-02-26

## 2020-02-20 RX ORDER — MULTIVIT-MIN/FERROUS GLUCONATE 9 MG/15 ML
1 LIQUID (ML) ORAL DAILY
Refills: 0 | Status: DISCONTINUED | OUTPATIENT
Start: 2020-02-20 | End: 2020-02-26

## 2020-02-20 RX ORDER — FUROSEMIDE 40 MG
40 TABLET ORAL ONCE
Refills: 0 | Status: COMPLETED | OUTPATIENT
Start: 2020-02-20 | End: 2020-02-20

## 2020-02-20 RX ORDER — TAMSULOSIN HYDROCHLORIDE 0.4 MG/1
0.4 CAPSULE ORAL
Refills: 0 | Status: DISCONTINUED | OUTPATIENT
Start: 2020-02-20 | End: 2020-02-26

## 2020-02-20 RX ORDER — ALBUTEROL 90 UG/1
2 AEROSOL, METERED ORAL
Qty: 0 | Refills: 0 | DISCHARGE

## 2020-02-20 RX ORDER — ROSUVASTATIN CALCIUM 5 MG/1
1 TABLET ORAL
Qty: 0 | Refills: 0 | DISCHARGE

## 2020-02-20 RX ORDER — ALBUTEROL 90 UG/1
2.5 AEROSOL, METERED ORAL EVERY 4 HOURS
Refills: 0 | Status: DISCONTINUED | OUTPATIENT
Start: 2020-02-20 | End: 2020-02-21

## 2020-02-20 RX ORDER — CHOLECALCIFEROL (VITAMIN D3) 125 MCG
1 CAPSULE ORAL
Qty: 0 | Refills: 0 | DISCHARGE

## 2020-02-20 RX ORDER — INSULIN LISPRO 100/ML
VIAL (ML) SUBCUTANEOUS
Refills: 0 | Status: DISCONTINUED | OUTPATIENT
Start: 2020-02-20 | End: 2020-02-26

## 2020-02-20 RX ORDER — MONTELUKAST 4 MG/1
10 TABLET, CHEWABLE ORAL DAILY
Refills: 0 | Status: DISCONTINUED | OUTPATIENT
Start: 2020-02-20 | End: 2020-02-26

## 2020-02-20 RX ORDER — FERROUS SULFATE 325(65) MG
325 TABLET ORAL DAILY
Refills: 0 | Status: DISCONTINUED | OUTPATIENT
Start: 2020-02-20 | End: 2020-02-26

## 2020-02-20 RX ORDER — ICOSAPENT ETHYL 500 MG/1
2 CAPSULE, LIQUID FILLED ORAL
Qty: 0 | Refills: 0 | DISCHARGE

## 2020-02-20 RX ORDER — PREGABALIN 225 MG/1
1 CAPSULE ORAL
Qty: 0 | Refills: 0 | DISCHARGE

## 2020-02-20 RX ORDER — BUDESONIDE AND FORMOTEROL FUMARATE DIHYDRATE 160; 4.5 UG/1; UG/1
2 AEROSOL RESPIRATORY (INHALATION)
Qty: 0 | Refills: 0 | DISCHARGE

## 2020-02-20 RX ORDER — AZITHROMYCIN 500 MG/1
500 TABLET, FILM COATED ORAL EVERY 24 HOURS
Refills: 0 | Status: COMPLETED | OUTPATIENT
Start: 2020-02-21 | End: 2020-02-22

## 2020-02-20 RX ORDER — IPRATROPIUM/ALBUTEROL SULFATE 18-103MCG
3 AEROSOL WITH ADAPTER (GRAM) INHALATION ONCE
Refills: 0 | Status: COMPLETED | OUTPATIENT
Start: 2020-02-20 | End: 2020-02-20

## 2020-02-20 RX ORDER — CEFTRIAXONE 500 MG/1
1000 INJECTION, POWDER, FOR SOLUTION INTRAMUSCULAR; INTRAVENOUS ONCE
Refills: 0 | Status: COMPLETED | OUTPATIENT
Start: 2020-02-20 | End: 2020-02-20

## 2020-02-20 RX ORDER — ASCORBIC ACID 60 MG
500 TABLET,CHEWABLE ORAL DAILY
Refills: 0 | Status: DISCONTINUED | OUTPATIENT
Start: 2020-02-20 | End: 2020-02-26

## 2020-02-20 RX ORDER — FUROSEMIDE 40 MG
1 TABLET ORAL
Qty: 0 | Refills: 0 | DISCHARGE

## 2020-02-20 RX ORDER — DEXTROSE 50 % IN WATER 50 %
15 SYRINGE (ML) INTRAVENOUS ONCE
Refills: 0 | Status: DISCONTINUED | OUTPATIENT
Start: 2020-02-20 | End: 2020-02-26

## 2020-02-20 RX ORDER — NITROGLYCERIN 6.5 MG
1 CAPSULE, EXTENDED RELEASE ORAL
Qty: 0 | Refills: 0 | DISCHARGE

## 2020-02-20 RX ORDER — INSULIN LISPRO 100/ML
VIAL (ML) SUBCUTANEOUS AT BEDTIME
Refills: 0 | Status: DISCONTINUED | OUTPATIENT
Start: 2020-02-20 | End: 2020-02-26

## 2020-02-20 RX ORDER — PRASUGREL 5 MG/1
1 TABLET, FILM COATED ORAL
Qty: 0 | Refills: 0 | DISCHARGE

## 2020-02-20 RX ORDER — PANTOPRAZOLE SODIUM 20 MG/1
1 TABLET, DELAYED RELEASE ORAL
Qty: 0 | Refills: 0 | DISCHARGE

## 2020-02-20 RX ORDER — SERTRALINE 25 MG/1
50 TABLET, FILM COATED ORAL DAILY
Refills: 0 | Status: DISCONTINUED | OUTPATIENT
Start: 2020-02-20 | End: 2020-02-26

## 2020-02-20 RX ORDER — METOPROLOL TARTRATE 50 MG
1 TABLET ORAL
Qty: 0 | Refills: 0 | DISCHARGE

## 2020-02-20 RX ORDER — AZITHROMYCIN 500 MG/1
500 TABLET, FILM COATED ORAL EVERY 24 HOURS
Refills: 0 | Status: DISCONTINUED | OUTPATIENT
Start: 2020-02-20 | End: 2020-02-20

## 2020-02-20 RX ADMIN — Medication 3: at 17:29

## 2020-02-20 RX ADMIN — SODIUM CHLORIDE 1000 MILLILITER(S): 9 INJECTION INTRAMUSCULAR; INTRAVENOUS; SUBCUTANEOUS at 08:43

## 2020-02-20 RX ADMIN — Medication 125 MILLIGRAM(S): at 08:43

## 2020-02-20 RX ADMIN — Medication 20 MILLIGRAM(S): at 18:26

## 2020-02-20 RX ADMIN — TAMSULOSIN HYDROCHLORIDE 0.4 MILLIGRAM(S): 0.4 CAPSULE ORAL at 21:24

## 2020-02-20 RX ADMIN — Medication 500 MILLIGRAM(S): at 21:24

## 2020-02-20 RX ADMIN — PANTOPRAZOLE SODIUM 40 MILLIGRAM(S): 20 TABLET, DELAYED RELEASE ORAL at 21:24

## 2020-02-20 RX ADMIN — Medication 3 MILLILITER(S): at 08:43

## 2020-02-20 RX ADMIN — Medication 325 MILLIGRAM(S): at 21:25

## 2020-02-20 RX ADMIN — CEFTRIAXONE 100 MILLIGRAM(S): 500 INJECTION, POWDER, FOR SOLUTION INTRAMUSCULAR; INTRAVENOUS at 08:44

## 2020-02-20 RX ADMIN — AZITHROMYCIN 255 MILLIGRAM(S): 500 TABLET, FILM COATED ORAL at 10:11

## 2020-02-20 RX ADMIN — Medication 40 MILLIGRAM(S): at 18:26

## 2020-02-20 RX ADMIN — Medication 600 MILLIGRAM(S): at 18:26

## 2020-02-20 RX ADMIN — Medication 200 MILLIGRAM(S): at 21:31

## 2020-02-20 RX ADMIN — ALBUTEROL 2.5 MILLIGRAM(S): 90 AEROSOL, METERED ORAL at 20:15

## 2020-02-20 RX ADMIN — CARVEDILOL PHOSPHATE 25 MILLIGRAM(S): 80 CAPSULE, EXTENDED RELEASE ORAL at 18:26

## 2020-02-20 RX ADMIN — Medication 81 MILLIGRAM(S): at 21:25

## 2020-02-20 NOTE — CONSULT NOTE ADULT - ATTENDING COMMENTS
I saw and examined the patient personally. Spoke with above provider regarding this case. I reviewed the above findings completely.  I agree with the above history, physical, and plan which I have edited where appropriate.      89 yo male PMH of Afib (s/p Watchman procedure 2017 on Xarelto currently being held as of 2 weeks for blood in stool), remote CABG, PCIX4 last one being 2016, Pacemaker, HTN, HLD, DM2 presenting for cough and SOB.  - SOB is multifactorial  - pulm eval.   - abx for pna  - ADHF give lasix 40mg IV x 1. reassess tomorrow.   - Please continue to maintain strict I/Os, monitor daily weights, Cr, and K.  - check echo  - hold ac.   - GI w/u  - Further cardiac workup will depend on clinical course.

## 2020-02-20 NOTE — H&P ADULT - NSHPREVIEWOFSYSTEMS_GEN_ALL_CORE
87 yo M with pmh chronic afib was on eliquis but stopped 2 weeks ago due to gi bleed, htn. hld, CABG x3, CAD, sepsis sec to prostate bx, DM2, prev MI, PPM recently checked at cardio/pmd off states was ok who p/w with 4 days of worsening weakness, cough with yellowish sputum production and minimal improvement despite being rx doxycycline 3 days ago. Pt also has some shortness of breath and worsening wheezing. Denies any other acute complants at this time.     In the ED pt had a septic worup, give iv antibx and recommended admission, pt was seen by pulm who recommended ct chest, nebs for poss copd exacerbation with ?chf

## 2020-02-20 NOTE — GOALS OF CARE CONVERSATION - ADVANCED CARE PLANNING - CONVERSATION DETAILS
Received referral for molst completion.As per Dr Constantino he spoke to family and patient who want cpr/ full code.There is a health care proxy, no molst at this time.

## 2020-02-20 NOTE — H&P ADULT - ASSESSMENT
89 yo M with pmh chronic afib was on eliquis but stopped 2 weeks ago due to gi bleed, htn. hld, CABG x3, CAD, sepsis sec to prostate bx, DM2, prev MI, COPD not on home O2,  PPM recently checked at cardio/pmd off states was ok who p/w with 4 days of worsening weakness, cough with yellowish sputum production and minimal improvement despite being rx doxycycline 3 days ago admitted with acute copd exacerbation, suspected CHF exacerbation, and RLL PNA, CAP

## 2020-02-20 NOTE — CONSULT NOTE ADULT - SUBJECTIVE AND OBJECTIVE BOX
Nephrology Consultation: Lazaro Bowen MD     ANNAMARIA AVALOS  88y    HPI:    89 yo M with pmh chronic afib was on eliquis but stopped 2 weeks ago due to gi bleed, htn. hld, CABG x3, CAD, sepsis sec to prostate bx, DM2, prev MI, PPM recently checked at cardio/pmd off states was ok who p/w with 4 days of worsening weakness, cough with yellowish sputum production and minimal improvement despite being rx doxycycline 3 days ago. Pt also has some shortness of breath and worsening wheezing. Denies any other acute complaints at this time. He denies any use of NSAIDS. He has been on ARB and has been tolerating well. He also has been on lasix at home. He was never told of any issues with the kidneys by his PMD. He has a history of nocturia but no history of UTI or stone disease.     PAST MEDICAL & SURGICAL HISTORY:  Coronary artery disease involving native coronary artery of native heart, angina presence unspecified  Persistent atrial fibrillation: wwas on eliquis, stopped due to gi bleed workup underway  Type 2 diabetes mellitus without complication, without long-term current use of insulin  Hyperlipemia  MI, old  Hypertension  Sepsis: sec to prostate bx  S/P coronary artery bypass graft x 3  Pacemaker: checked at pmd/cardio office 1 week ago  Presence of Watchman left atrial appendage closure device  H/O prostate biopsy  History of cholecystectomy  Artificial cardiac pacemaker  Stented coronary artery  H/O coronary angioplasty: 4 stents  S/P CABG x 3      Allergies    No Known Allergies    Intolerances    Home Medications:  aspirin 81 mg oral tablet, chewable: 1 tab(s) orally once a day (20 Feb 2020 11:59)  carvedilol 25 mg oral tablet: 1 tab(s) orally 2 times a day (20 Feb 2020 11:59)  Centrum Silver oral tablet: 1 tab(s) orally once a day (20 Feb 2020 11:59)  ferrous sulfate 200 mg (65 mg elemental iron) oral tablet: 1 tab(s) orally once a day (20 Feb 2020 11:59)  Flomax 0.4 mg oral capsule: 1 cap(s) orally 2 times a day (20 Feb 2020 11:59)  furosemide 40 mg oral tablet: 1 tab(s) orally once a day (20 Feb 2020 11:59)  metFORMIN 500 mg oral tablet: 1 tab(s) orally 2 times a day (20 Feb 2020 11:59)  montelukast 10 mg oral tablet: 1 tab(s) orally once a day (20 Feb 2020 11:59)  Protonix 40 mg oral delayed release tablet: 1 tab(s) orally 2 times a day (20 Feb 2020 12:14)  valsartan 80 mg oral tablet: 1 tab(s) orally once a day (in the evening) (20 Feb 2020 11:59)  Vitamin C 100 mg oral tablet: 1 tab(s) orally once a day (20 Feb 2020 11:59)  Xarelto 2.5 mg oral tablet: 1 tab(s) orally 2 times a day (20 Feb 2020 11:59)  Zoloft 50 mg oral tablet: 1.5 tab(s) orally once a day (20 Feb 2020 11:59)        FAMILY HISTORY:  Family history of cardiomegaly: mother  Family history of breast cancer in sister  FH: MI (myocardial infarction): in father due to freon exposure in work in [place]      SOCIAL HISTORY:    REVIEW OF SYSTEMS:    Constitutional: No fever, weight loss or fatigue  Eyes: No eye pain, visual disturbances, or discharge  ENT:  No difficulty hearing, tinnitus, vertigo; No sinus or throat pain  Neck: No pain or stiffness  Breasts: No pain, masses or nipple discharge  Respiratory: No cough, wheezing, chills or hemoptysis  Cardiovascular: No chest pain, palpitations, shortness of breath, dizziness or leg swelling  Gastrointestinal: No abdominal or epigastric pain. No nausea, vomiting or hematemesis; No diarrhea or constipation. No melena or hematochezia.  Genitourinary: No dysuria, frequency, hematuria or incontinence  Rectal: No pain, hemorrhoids or incontinence  Neurological: No headaches, memory loss, loss of strength, numbness or tremors  Skin: No itching, burning, rashes or lesions   Lymph Nodes: No enlarged glands  Endocrine: No heat or cold intolerance; No hair loss  Musculoskeletal: No joint pain or swelling; No muscle, back or extremity pain  Psychiatric: No depression, anxiety, mood swings or difficulty sleeping  Heme/Lymph: No easy bruising or bleeding gums  Allergy and Immunologic: No hives or eczema    current medications:    ALBUTerol    0.083% 2.5 milliGRAM(s) Nebulizer every 4 hours PRN  ascorbic acid 500 milliGRAM(s) Oral daily  aspirin  chewable 81 milliGRAM(s) Oral daily  carvedilol 25 milliGRAM(s) Oral every 12 hours  dextrose 40% Gel 15 Gram(s) Oral once PRN  dextrose 5%. 1000 milliLiter(s) IV Continuous <Continuous>  dextrose 50% Injectable 12.5 Gram(s) IV Push once  dextrose 50% Injectable 25 Gram(s) IV Push once  dextrose 50% Injectable 25 Gram(s) IV Push once  ferrous    sulfate 325 milliGRAM(s) Oral daily  glucagon  Injectable 1 milliGRAM(s) IntraMuscular once PRN  guaiFENesin   Syrup  (Sugar-Free) 200 milliGRAM(s) Oral every 6 hours PRN  guaiFENesin  milliGRAM(s) Oral every 12 hours  insulin lispro (HumaLOG) corrective regimen sliding scale   SubCutaneous three times a day before meals  insulin lispro (HumaLOG) corrective regimen sliding scale   SubCutaneous at bedtime  methylPREDNISolone sodium succinate Injectable 20 milliGRAM(s) IV Push every 8 hours  montelukast 10 milliGRAM(s) Oral daily  multivitamin/minerals 1 Tablet(s) Oral daily  pantoprazole    Tablet 40 milliGRAM(s) Oral two times a day  sertraline 50 milliGRAM(s) Oral daily  tamsulosin 0.4 milliGRAM(s) Oral two times a day      Vital Signs Last 24 Hrs  T(C): 36.4 (20 Feb 2020 15:48), Max: 37.3 (20 Feb 2020 13:05)  T(F): 97.5 (20 Feb 2020 15:48), Max: 99.2 (20 Feb 2020 13:05)  HR: 70 (20 Feb 2020 15:48) (70 - 94)  BP: 128/71 (20 Feb 2020 15:48) (124/63 - 158/72)  BP(mean): --  RR: 21 (20 Feb 2020 15:48) (20 - 25)  SpO2: 96% (20 Feb 2020 15:48) (92% - 99%)    PHYSICAL EXAM:    Constitutional: NAD, well-groomed, well-developed  HEENT: PERRLA, EOMI, Normal Hearing, MMM  Neck: No LAD, No JVD  Back: Normal spine flexure, No CVA tenderness  Respiratory: CTAB/L   Cardiovascular: S1 and S2, RRR, no M/G/R  Gastrointestinal: BS+, soft, NT/ND  Extremities: No peripheral edema  Vascular: 2+ peripheral pulses  Neurological: A/O x 3, no focal deficits  Skin: No rashes      LABS:                        8.4    6.04  )-----------( 110      ( 20 Feb 2020 09:02 )             25.6     02-20    138  |  104  |  44<H>  ----------------------------<  155<H>  4.4   |  24  |  1.70<H>    Ca    9.3      20 Feb 2020 09:02    TPro  6.9  /  Alb  3.4  /  TBili  0.6  /  DBili  x   /  AST  19  /  ALT  15  /  AlkPhos  46  02-20    PT/INR - ( 20 Feb 2020 09:02 )   PT: 14.5 sec;   INR: 1.28 ratio         PTT - ( 20 Feb 2020 09:02 )  PTT:30.6 sec    Creatinine Trend: 1.70<--    MICROBIOLOGY:  RECENT CULTURES:        RADIOLOGY & ADDITIONAL STUDIES:      < from: CT Chest No Cont (02.20.20 @ 11:13) >  EXAM:  CT CHEST                            PROCEDURE DATE:  02/20/2020          INTERPRETATION:  CLINICAL INFORMATION: Shortness of breath.    COMPARISON: Chest CT 6/28/2013.    PROCEDURE:   CT of the Chest was performed without intravenous contrast.  Sagittal and coronal reformats were performed.      FINDINGS:    LUNGS AND AIRWAYS: Patent central airways.  Diffuse interstitial prominence throughout both lungs. There are patchy and nodular tree-in-bud type opacities throughout both lungs but predominantly in the right lower lobe, likely infectious in etiology. There is diffuse intralobular septal thickening, likely representing pulmonary vascular congestion.    PLEURA: No pleural effusion.    MEDIASTINUM AND TAMIA: No lymphadenopathy.    VESSELS: The thoracic aorta and main pulmonary artery are normal in caliber. There is heavy coronary artery calcification.    HEART: Heart size is enlarged. There are pacemaker leads in the right atrium and right ventricle. There is aortic valvular calcification. A device is present within the left atrial appendage. No pericardial effusion.    CHEST WALL AND LOWER NECK: The thyroid gland is within normal limits. There is no supraclavicular or axillary lymphadenopathy. There is a pacemaker generator pack in the left anterior chest wall with leads through a left subclavian approach.    VISUALIZED UPPER ABDOMEN: The adrenal glands are within normal limits. The imaged portions of the unenhanced liver, spleen, are within normal limits. The pancreas is atrophic. What likely represents cholecystectomy clips are noted in the gallbladder fossa. Small hiatal hernia.    BONES: Multilevel degenerative change of the thoracolumbar spine with osteophytes, degenerative disc disease and facet joint arthropathy. There are median sternotomy wires.    IMPRESSION:     Pulmonary vascular congestion.  Patchy and nodular opacities in both lungs, particularly in the right lower lobe, likely reflecting pneumonia.

## 2020-02-20 NOTE — H&P ADULT - NSICDXPASTSURGICALHX_GEN_ALL_CORE_FT
PAST SURGICAL HISTORY:  Artificial cardiac pacemaker     H/O coronary angioplasty 4 stents    H/O prostate biopsy     History of cholecystectomy     S/P CABG x 3     Stented coronary artery

## 2020-02-20 NOTE — CONSULT NOTE ADULT - PROBLEM SELECTOR RECOMMENDATION 9
dyspnea, sob, power, for 3 days, has Obstructive Airway Disease and extensive Heart Disease Hx  pt is on Pradaxa for AF   proBNP elev - assess for HF  CXR reviewed - no evidence of PNA - will check CT chest to eval Lung Disease - PNA and or other causes of Dyspnea  NEBS and Systemic Steroids and o2 support - COPD management - will check VBG  cough rx regimen - MUCINEX BID and Robitussin PRN  tele monitoring  GOC discussion  Supportive medical regimen  Cardio eval will benefit   old records reviewed - TTE and EP notes - extensive cardiac hx  GILBERT - ? baseline ckd - monitor renal indices - s/p IVF - 1 L bolus in ED

## 2020-02-20 NOTE — CONSULT NOTE ADULT - SUBJECTIVE AND OBJECTIVE BOX
[facility] - Raman Ceja, Deneen, Aryan, Sloane, Evan Hernandez  Office Number: [phone]    Initial Consult Note    CHIEF COMPLAINT: Patient is a 88y old  Male who presents with a chief complaint of SOB and cough     HPI:  This is a 89 yo male PMH of Afib (s/p Watchman procedure 2017 on Xarelto currently being held as of 2 weeks for blood in stool), remote CABG, PCIX4 last one being 2016, Pacemaker, HTN, HLD, DM2 presenting for cough and SOB. Pt states cough began two days ago, pt was seen by his PCP/cardio Dr. Smith, was prescribed Doxycyline Pt has SOB at baseline, uses O2 at night, states that SOB has worsened over the past two days, worse on exertion and with laying flat. At baseline pt uses two pillows when sleeping, which has not changed, denies any LE swelling. Admits to chills, denies fever, sick contacts, recent travel. Pt did have flu vaccine.        PAST MEDICAL & SURGICAL HISTORY:  Coronary artery disease involving native coronary artery of native heart, angina presence unspecified  Persistent atrial fibrillation  Type 2 diabetes mellitus without complication, without long-term current use of insulin  Hyperlipemia  MI, old  Hypertension  S/P coronary artery bypass graft x 3  Pacemaker  H/O prostate biopsy  History of cholecystectomy  Artificial cardiac pacemaker  Stented coronary artery  H/O coronary angioplasty: 4 stents  S/P CABG x 3    SOCIAL HISTORY:  Former smoker, smoked for 30 years 1PPD, quit 30 years ago     FAMILY HISTORY: father MI at 72, sister breast cancer     HOME MEDICATION:         MEDICATIONS  (STANDING):  guaiFENesin  milliGRAM(s) Oral every 12 hours  methylPREDNISolone sodium succinate Injectable 20 milliGRAM(s) IV Push every 8 hours    MEDICATIONS  (PRN):  ALBUTerol    0.083% 2.5 milliGRAM(s) Nebulizer every 4 hours PRN Shortness of Breath and/or Wheezing  guaiFENesin   Syrup  (Sugar-Free) 200 milliGRAM(s) Oral every 6 hours PRN Cough      Allergies    No Known Allergies    Intolerances    REVIEW OF SYSTEMS:    CONSTITUTIONAL: + weakness, no fevers + chills  RESPIRATORY: + cough, + shortness of breath  CARDIOVASCULAR: No chest pain or palpitations  GASTROINTESTINAL: No abdominal pain. No nausea, vomiting  GENITOURINARY: No dysuria, frequency or hematuria  NEUROLOGICAL: No numbness or weakness  SKIN: No itching or rash  All other review of systems is negative unless indicated above    VITAL SIGNS:   Vital Signs Last 24 Hrs  T(C): 36.8 (20 Feb 2020 07:28), Max: 36.8 (20 Feb 2020 07:28)  T(F): 98.2 (20 Feb 2020 07:28), Max: 98.2 (20 Feb 2020 07:28)  HR: 94 (20 Feb 2020 07:28) (94 - 94)  BP: 124/63 (20 Feb 2020 07:28) (124/63 - 124/63)  BP(mean): --  RR: 20 (20 Feb 2020 07:28) (20 - 20)  SpO2: 99% (20 Feb 2020 07:28) (99% - 99%)    I&O's Summary    On Exam:    Constitutional: NAD, alert and oriented x 3  Lungs: diffuse wheezing worse on expiration    Cardiovascular: RRR.  S1 and S2 positive.  No murmurs, rubs, gallops or clicks  Gastrointestinal: soft, nontender.   Psych:  Mood & affect appropriate.    LABS: All Labs Reviewed:                        8.4    6.04  )-----------( 110      ( 20 Feb 2020 09:02 )             25.6     20 Feb 2020 09:02    138    |  104    |  44     ----------------------------<  155    4.4     |  24     |  1.70     Ca    9.3        20 Feb 2020 09:02    TPro  6.9    /  Alb  3.4    /  TBili  0.6    /  DBili  x      /  AST  19     /  ALT  15     /  AlkPhos  46     20 Feb 2020 09:02    PT/INR - ( 20 Feb 2020 09:02 )   PT: 14.5 sec;   INR: 1.28 ratio         PTT - ( 20 Feb 2020 09:02 )  PTT:30.6 sec  Blood Culture:   02-20 @ 09:02  Pro Bnp [id number]    02-20 @ 09:02  TSH: 1.29      RADIOLOGY:    EKG: [facility] - Raman Ceja, Deneen, Aryan, Sloane, Evan Hernandez  Office Number: [phone]    Initial Consult Note    CHIEF COMPLAINT: Patient is a 88y old  Male who presents with a chief complaint of SOB and cough     HPI:  This is a 87 yo male PMH of Afib (s/p Watchman procedure 2017 on Xarelto currently being held as of 2 weeks for blood in stool), remote CABG, PCIX4 last one being 2016, Pacemaker, HTN, HLD, DM2 presenting for cough and SOB. Pt states cough began two days ago, pt was seen by his PCP/cardio Dr. Smith, was prescribed Doxycyline Pt has SOB at baseline, uses O2 at night, states that SOB has worsened over the past two days, worse on exertion and with laying flat. At baseline pt uses two pillows when sleeping, which has not changed, denies any LE swelling. Admits to chills, denies fever, sick contacts, recent travel. Pt did have flu vaccine.       PAST MEDICAL & SURGICAL HISTORY:  Coronary artery disease involving native coronary artery of native heart, angina presence unspecified  Persistent atrial fibrillation  Type 2 diabetes mellitus without complication, without long-term current use of insulin  Hyperlipemia  MI, old  Hypertension  S/P coronary artery bypass graft x 3  Pacemaker  H/O prostate biopsy  History of cholecystectomy  Artificial cardiac pacemaker  Stented coronary artery  H/O coronary angioplasty: 4 stents  S/P CABG x 3    SOCIAL HISTORY:  Former smoker, smoked for 30 years 1PPD, quit 30 years ago     FAMILY HISTORY: father MI at 72, sister breast cancer     HOME MEDICATION:         MEDICATIONS  (STANDING):  guaiFENesin  milliGRAM(s) Oral every 12 hours  methylPREDNISolone sodium succinate Injectable 20 milliGRAM(s) IV Push every 8 hours    MEDICATIONS  (PRN):  ALBUTerol    0.083% 2.5 milliGRAM(s) Nebulizer every 4 hours PRN Shortness of Breath and/or Wheezing  guaiFENesin   Syrup  (Sugar-Free) 200 milliGRAM(s) Oral every 6 hours PRN Cough      Allergies    No Known Allergies    Intolerances    REVIEW OF SYSTEMS:    CONSTITUTIONAL: + weakness, no fevers + chills  RESPIRATORY: + cough, + shortness of breath  CARDIOVASCULAR: No chest pain or palpitations  GASTROINTESTINAL: No abdominal pain. No nausea, vomiting  GENITOURINARY: No dysuria, frequency or hematuria  NEUROLOGICAL: No numbness or weakness  SKIN: No itching or rash  All other review of systems is negative unless indicated above    VITAL SIGNS:   Vital Signs Last 24 Hrs  T(C): 36.8 (20 Feb 2020 07:28), Max: 36.8 (20 Feb 2020 07:28)  T(F): 98.2 (20 Feb 2020 07:28), Max: 98.2 (20 Feb 2020 07:28)  HR: 94 (20 Feb 2020 07:28) (94 - 94)  BP: 124/63 (20 Feb 2020 07:28) (124/63 - 124/63)  BP(mean): --  RR: 20 (20 Feb 2020 07:28) (20 - 20)  SpO2: 99% (20 Feb 2020 07:28) (99% - 99%)    I&O's Summary    On Exam:    Constitutional: NAD, alert and oriented x 3  Lungs: diffuse wheezing worse on expiration    Cardiovascular: RRR.  S1 and S2 positive.  No murmurs, rubs, gallops or clicks  Gastrointestinal: soft, nontender.   Psych:  Mood & affect appropriate.    LABS: All Labs Reviewed:                        8.4    6.04  )-----------( 110      ( 20 Feb 2020 09:02 )             25.6     20 Feb 2020 09:02    138    |  104    |  44     ----------------------------<  155    4.4     |  24     |  1.70     Ca    9.3        20 Feb 2020 09:02    TPro  6.9    /  Alb  3.4    /  TBili  0.6    /  DBili  x      /  AST  19     /  ALT  15     /  AlkPhos  46     20 Feb 2020 09:02    PT/INR - ( 20 Feb 2020 09:02 )   PT: 14.5 sec;   INR: 1.28 ratio         PTT - ( 20 Feb 2020 09:02 )  PTT:30.6 sec  Blood Culture:   02-20 @ 09:02  Pro Bnp [id number]    02-20 @ 09:02  TSH: 1.29      RADIOLOGY:    EKG: [facility] - Raman Ceja, Deneen, Aryan, Sloane, Evan Hernandez  Office Number: [phone]    Initial Consult Note    CHIEF COMPLAINT: Patient is a 88y old  Male who presents with a chief complaint of SOB and cough     HPI:  This is a 87 yo male PMH of Afib (s/p Watchman procedure 2017 on Xarelto currently being held as of 2 weeks for blood in stool), remote CABG, PCIX4 last one being 2016, Pacemaker, HTN, HLD, DM2 presenting for cough and SOB. Pt states cough began two days ago, pt was seen by his PCP/cardio Dr. Smith, was prescribed Doxycyline Pt has SOB at baseline, uses O2 at night, states that SOB has worsened over the past two days, worse on exertion and with laying flat. At baseline pt uses two pillows when sleeping, which has not changed, denies any LE swelling. Admits to chills, denies fever, sick contacts, recent travel. Pt did have flu vaccine.       PAST MEDICAL & SURGICAL HISTORY:  Coronary artery disease involving native coronary artery of native heart, angina presence unspecified  Persistent atrial fibrillation  Type 2 diabetes mellitus without complication, without long-term current use of insulin  Hyperlipemia  MI, old  Hypertension  S/P coronary artery bypass graft x 3  Pacemaker  H/O prostate biopsy  History of cholecystectomy  Artificial cardiac pacemaker  Stented coronary artery  H/O coronary angioplasty: 4 stents  S/P CABG x 3    SOCIAL HISTORY:  Former smoker, smoked for 30 years 1PPD, quit 30 years ago     FAMILY HISTORY: father MI at 72, sister breast cancer     HOME MEDICATION:         MEDICATIONS  (STANDING):  guaiFENesin  milliGRAM(s) Oral every 12 hours  methylPREDNISolone sodium succinate Injectable 20 milliGRAM(s) IV Push every 8 hours    MEDICATIONS  (PRN):  ALBUTerol    0.083% 2.5 milliGRAM(s) Nebulizer every 4 hours PRN Shortness of Breath and/or Wheezing  guaiFENesin   Syrup  (Sugar-Free) 200 milliGRAM(s) Oral every 6 hours PRN Cough      Allergies    No Known Allergies    Intolerances    REVIEW OF SYSTEMS:    CONSTITUTIONAL: + weakness, no fevers + chills  RESPIRATORY: + cough, + shortness of breath  CARDIOVASCULAR: No chest pain or palpitations  GASTROINTESTINAL: No abdominal pain. No nausea, vomiting  GENITOURINARY: No dysuria, frequency or hematuria  NEUROLOGICAL: No numbness or weakness  SKIN: No itching or rash  All other review of systems is negative unless indicated above    VITAL SIGNS:   Vital Signs Last 24 Hrs  T(C): 36.8 (20 Feb 2020 07:28), Max: 36.8 (20 Feb 2020 07:28)  T(F): 98.2 (20 Feb 2020 07:28), Max: 98.2 (20 Feb 2020 07:28)  HR: 94 (20 Feb 2020 07:28) (94 - 94)  BP: 124/63 (20 Feb 2020 07:28) (124/63 - 124/63)  BP(mean): --  RR: 20 (20 Feb 2020 07:28) (20 - 20)  SpO2: 99% (20 Feb 2020 07:28) (99% - 99%)    I&O's Summary    On Exam:    Constitutional: NAD, alert and oriented x 3  Lungs: diffuse wheezing worse on expiration    Cardiovascular: RRR.  S1 and S2 positive.  No murmurs, rubs, gallops or clicks  Gastrointestinal: soft, nontender.   Psych:  Mood & affect appropriate.    LABS: All Labs Reviewed:                        8.4    6.04  )-----------( 110      ( 20 Feb 2020 09:02 )             25.6     20 Feb 2020 09:02    138    |  104    |  44     ----------------------------<  155    4.4     |  24     |  1.70     Ca    9.3        20 Feb 2020 09:02    TPro  6.9    /  Alb  3.4    /  TBili  0.6    /  DBili  x      /  AST  19     /  ALT  15     /  AlkPhos  46     20 Feb 2020 09:02    PT/INR - ( 20 Feb 2020 09:02 )   PT: 14.5 sec;   INR: 1.28 ratio         PTT - ( 20 Feb 2020 09:02 )  PTT:30.6 sec  Blood Culture:   02-20 @ 09:02  Pro Bnp [id number]    02-20 @ 09:02  TSH: 1.29      RADIOLOGY:    CXR: Left subclavian approach pacemaker with leads in the right atrium and right ventricle.    Heart/Mediastinum/Lungs/Other: The heart size is enlarged.The lungs are clear.There are no pleural effusions. Median sternotomy wires.      CT chest:   Pulmonary vascular congestion.  Patchy and nodular opacities in both lungs, particularly in the right lower lobe, likely reflecting pneumonia.    EKG: [facility] - Raman Ceja, Deneen, Aryan, Sloane, Evan Hernandez  Office Number: [phone]    Initial Consult Note    CHIEF COMPLAINT: Patient is a 88y old  Male who presents with a chief complaint of SOB and cough     HPI:  This is a 89 yo male PMH of Afib (s/p Watchman procedure 2017 on Xarelto currently being held as of 2 weeks for blood in stool), remote CABG, PCIX4 last one being 2016, Pacemaker, HTN, HLD, DM2 presenting for cough and SOB. Pt states cough began two days ago, pt was seen by his PCP/cardio Dr. Smith, was prescribed Doxycyline Pt has SOB at baseline, uses O2 at night, states that SOB has worsened over the past two days, worse on exertion and with laying flat. At baseline pt uses two pillows when sleeping, which has not changed, denies any LE swelling. Admits to chills, denies fever, sick contacts, recent travel. Pt did have flu vaccine.       PAST MEDICAL & SURGICAL HISTORY:  Coronary artery disease involving native coronary artery of native heart, angina presence unspecified  Persistent atrial fibrillation  Type 2 diabetes mellitus without complication, without long-term current use of insulin  Hyperlipemia  MI, old  Hypertension  S/P coronary artery bypass graft x 3  Pacemaker  H/O prostate biopsy  History of cholecystectomy  Artificial cardiac pacemaker  Stented coronary artery  H/O coronary angioplasty: 4 stents  S/P CABG x 3    SOCIAL HISTORY:  Former smoker, smoked for 30 years 1PPD, quit 30 years ago     FAMILY HISTORY: father MI at 72, sister breast cancer     HOME MEDICATION:   Carvedilol 25 mg, 1 tablet, twice a day  Pantoprazole 40 mg, 1 tablet, twice a day  Tamulosin 0.4 mg, 1 tablet, twice a day  Xarelto 2.5 mg, 1 tablet, twice a day, currently held due to melena  Rosuvastatin, 20 mg, 1 tablet, once a day  Valsartan, 80 mg, 1 tablet, once a day  Sertaline 75 mg, 1.5 tablets, once a day  Montelukast SOD 10 mg, 1 tablet, once a day  Furosemide 40 mg, 1 tablet, once a day  Metformin, 500 mg, twice a day  Aspirin, 81 mg, 1 tablet, once a day  Fish Oil 1000 mg, 2 tablets, twice a day  Vitamin C 100 mg, 1 tablet, once a day  Centrum Silver, 1 tablet, once a day  Iron 65 mg, 1 tablet, once a day  Breo Ellipta, inhaled at night  Nitroglycerin patch (8AM to 8PM)   Sublingual Nitroglycerin 0.4 prn for chest pain      MEDICATIONS  (STANDING):  guaiFENesin  milliGRAM(s) Oral every 12 hours  methylPREDNISolone sodium succinate Injectable 20 milliGRAM(s) IV Push every 8 hours    MEDICATIONS  (PRN):  ALBUTerol    0.083% 2.5 milliGRAM(s) Nebulizer every 4 hours PRN Shortness of Breath and/or Wheezing  guaiFENesin   Syrup  (Sugar-Free) 200 milliGRAM(s) Oral every 6 hours PRN Cough      Allergies    No Known Allergies    Intolerances    REVIEW OF SYSTEMS:    CONSTITUTIONAL: + weakness, no fevers + chills  RESPIRATORY: + cough, + shortness of breath  CARDIOVASCULAR: No chest pain or palpitations  GASTROINTESTINAL: No abdominal pain. No nausea, vomiting  GENITOURINARY: No dysuria, frequency or hematuria  NEUROLOGICAL: No numbness or weakness  SKIN: No itching or rash  All other review of systems is negative unless indicated above    VITAL SIGNS:   Vital Signs Last 24 Hrs  T(C): 36.8 (20 Feb 2020 07:28), Max: 36.8 (20 Feb 2020 07:28)  T(F): 98.2 (20 Feb 2020 07:28), Max: 98.2 (20 Feb 2020 07:28)  HR: 94 (20 Feb 2020 07:28) (94 - 94)  BP: 124/63 (20 Feb 2020 07:28) (124/63 - 124/63)  BP(mean): --  RR: 20 (20 Feb 2020 07:28) (20 - 20)  SpO2: 99% (20 Feb 2020 07:28) (99% - 99%)    I&O's Summary    On Exam:    Constitutional: NAD, alert and oriented x 3  Lungs: diffuse wheezing worse on expiration    Cardiovascular: RRR.  S1 and S2 positive.  No murmurs, rubs, gallops or clicks  Gastrointestinal: soft, nontender.   Psych:  Mood & affect appropriate.    LABS: All Labs Reviewed:                        8.4    6.04  )-----------( 110      ( 20 Feb 2020 09:02 )             25.6     20 Feb 2020 09:02    138    |  104    |  44     ----------------------------<  155    4.4     |  24     |  1.70     Ca    9.3        20 Feb 2020 09:02    TPro  6.9    /  Alb  3.4    /  TBili  0.6    /  DBili  x      /  AST  19     /  ALT  15     /  AlkPhos  46     20 Feb 2020 09:02    PT/INR - ( 20 Feb 2020 09:02 )   PT: 14.5 sec;   INR: 1.28 ratio         PTT - ( 20 Feb 2020 09:02 )  PTT:30.6 sec  Blood Culture:   02-20 @ 09:02  Pro Bnp [id number]    02-20 @ 09:02  TSH: 1.29      RADIOLOGY:    CXR: Left subclavian approach pacemaker with leads in the right atrium and right ventricle.    Heart/Mediastinum/Lungs/Other: The heart size is enlarged.The lungs are clear.There are no pleural effusions. Median sternotomy wires.      CT chest:   Pulmonary vascular congestion.  Patchy and nodular opacities in both lungs, particularly in the right lower lobe, likely reflecting pneumonia.    EKG: [facility] - Raman Ceja, Deneen, Aryan, Sloane, Evan Hernandez  Office Number: [phone]    Initial Consult Note    CHIEF COMPLAINT: Patient is a 88y old  Male who presents with a chief complaint of SOB and cough     HPI:  This is a 89 yo male PMH of Afib (s/p Watchman procedure 2017 on Xarelto currently being held as of 2 weeks for blood in stool), remote CABG, PCIX4 last one being 2016, Pacemaker, HTN, HLD, DM2 presenting for cough and SOB. Pt states cough began two days ago, pt was seen by his PCP/cardio Dr. Smith, was prescribed Doxycyline Pt has SOB at baseline, uses O2 at night, states that SOB has worsened over the past two days, worse on exertion and with laying flat. At baseline pt uses two pillows when sleeping, which has not changed, denies any LE swelling. Admits to chills, denies fever, sick contacts, recent travel. Pt did have flu vaccine.       PAST MEDICAL & SURGICAL HISTORY:  Coronary artery disease involving native coronary artery of native heart, angina presence unspecified  Persistent atrial fibrillation  Type 2 diabetes mellitus without complication, without long-term current use of insulin  Hyperlipemia  MI, old  Hypertension  S/P coronary artery bypass graft x 3  Pacemaker  H/O prostate biopsy  History of cholecystectomy  Artificial cardiac pacemaker  Stented coronary artery  H/O coronary angioplasty: 4 stents  S/P CABG x 3    SOCIAL HISTORY:  Former smoker, smoked for 30 years 1PPD, quit 30 years ago     FAMILY HISTORY: father MI at 72, sister breast cancer     HOME MEDICATION:   Carvedilol 25 mg, 1 tablet, twice a day  Pantoprazole 40 mg, 1 tablet, twice a day  Tamulosin 0.4 mg, 1 tablet, twice a day  Xarelto 2.5 mg, 1 tablet, twice a day, currently held due to melena  Rosuvastatin, 20 mg, 1 tablet, once a day  Valsartan, 80 mg, 1 tablet, once a day  Sertaline 75 mg, 1.5 tablets, once a day  Montelukast SOD 10 mg, 1 tablet, once a day  Furosemide 40 mg, 1 tablet, once a day  Metformin, 500 mg, twice a day  Aspirin, 81 mg, 1 tablet, once a day  Fish Oil 1000 mg, 2 tablets, twice a day  Vitamin C 100 mg, 1 tablet, once a day  Centrum Silver, 1 tablet, once a day  Iron 65 mg, 1 tablet, once a day  Breo Ellipta, inhaled at night  Nitroglycerin patch (8AM to 8PM)   Sublingual Nitroglycerin 0.4 prn for chest pain      MEDICATIONS  (STANDING):  guaiFENesin  milliGRAM(s) Oral every 12 hours  methylPREDNISolone sodium succinate Injectable 20 milliGRAM(s) IV Push every 8 hours    MEDICATIONS  (PRN):  ALBUTerol    0.083% 2.5 milliGRAM(s) Nebulizer every 4 hours PRN Shortness of Breath and/or Wheezing  guaiFENesin   Syrup  (Sugar-Free) 200 milliGRAM(s) Oral every 6 hours PRN Cough      Allergies    No Known Allergies    Intolerances    REVIEW OF SYSTEMS:    CONSTITUTIONAL: + weakness, no fevers + chills  RESPIRATORY: + cough, + shortness of breath  CARDIOVASCULAR: No chest pain or palpitations  GASTROINTESTINAL: No abdominal pain. No nausea, vomiting  GENITOURINARY: No dysuria, frequency or hematuria  NEUROLOGICAL: No numbness or weakness  SKIN: No itching or rash  All other review of systems is negative unless indicated above    VITAL SIGNS:   Vital Signs Last 24 Hrs  T(C): 36.8 (20 Feb 2020 07:28), Max: 36.8 (20 Feb 2020 07:28)  T(F): 98.2 (20 Feb 2020 07:28), Max: 98.2 (20 Feb 2020 07:28)  HR: 94 (20 Feb 2020 07:28) (94 - 94)  BP: 124/63 (20 Feb 2020 07:28) (124/63 - 124/63)  BP(mean): --  RR: 20 (20 Feb 2020 07:28) (20 - 20)  SpO2: 99% (20 Feb 2020 07:28) (99% - 99%)    I&O's Summary    On Exam:    Constitutional: NAD, alert and oriented x 3  Lungs: diffuse wheezing worse on expiration    Cardiovascular: RRR.  S1 and S2 positive.  No murmurs, rubs, gallops or clicks  Gastrointestinal: soft, nontender.   Psych:  Mood & affect appropriate.    LABS: All Labs Reviewed:                        8.4    6.04  )-----------( 110      ( 20 Feb 2020 09:02 )             25.6     20 Feb 2020 09:02    138    |  104    |  44     ----------------------------<  155    4.4     |  24     |  1.70     Ca    9.3        20 Feb 2020 09:02    TPro  6.9    /  Alb  3.4    /  TBili  0.6    /  DBili  x      /  AST  19     /  ALT  15     /  AlkPhos  46     20 Feb 2020 09:02    PT/INR - ( 20 Feb 2020 09:02 )   PT: 14.5 sec;   INR: 1.28 ratio         PTT - ( 20 Feb 2020 09:02 )  PTT:30.6 sec  Blood Culture:   02-20 @ 09:02  Pro Bnp [id number]    02-20 @ 09:02  TSH: 1.29      RADIOLOGY:    CXR: Left subclavian approach pacemaker with leads in the right atrium and right ventricle.    Heart/Mediastinum/Lungs/Other: The heart size is enlarged.The lungs are clear.There are no pleural effusions. Median sternotomy wires.      CT chest:   Pulmonary vascular congestion.  Patchy and nodular opacities in both lungs, particularly in the right lower lobe, likely reflecting pneumonia.      MARLENE (10/2017): 1. Mitral annular calcification, otherwise normal mitral  valve. Mild mitral regurgitation.  2. Calcified trileaflet aortic valve with normal opening.  Mild aortic regurgitation.  3. Moderate aortic root dilatation  (Ao: 4.8 cm at the  sinuses of Valsalva).  Mild non-mobile atherosclerotic  plaque in the aortic arch and descending thoracic aorta.  4. Dilated left atrium.  A Watchman closure device is  present and is well seated at the ostium of the left atrial  appendage (NYA).  Colour Doppler interrogation demonstrates  a small amount of residual flow around the device.  The  width of the joel-device jet is about  2-3 mm by colour  Doppler interrogation.  The joel-device flow occurs on the  medial aspect of the device.  5. Endocardium not well visualized; grossly normal left  ventricular systolic function.  6. Normal right ventricular size and function.  A device  wire is noted in the right heart.  7. Contrast injection demonstrates no evidence of a patent  foramen ovale.    EKG: [facility] - Raman Ceja, Deneen, Aryan, Sloane, Evan Hernandez  Office Number: [phone]    Initial Consult Note    CHIEF COMPLAINT: Patient is a 88y old  Male who presents with a chief complaint of SOB and cough     HPI:  This is a 87 yo male PMH of Afib (s/p Watchman procedure 2017 on Xarelto currently being held as of 2 weeks for blood in stool), remote CABG, PCIX4 last one being 2016, Pacemaker, HTN, HLD, DM2 presenting for cough and SOB. Pt states cough began two days ago, pt was seen by his PCP/cardio Dr. Smith, was prescribed Doxycyline Pt has SOB at baseline, uses O2 at night, states that SOB has worsened over the past two days, worse on exertion and with laying flat. At baseline pt uses two pillows when sleeping, which has not changed, denies any LE swelling. Admits to chills, denies fever, sick contacts, recent travel. Pt did have flu vaccine.       PAST MEDICAL & SURGICAL HISTORY:  Coronary artery disease involving native coronary artery of native heart, angina presence unspecified  Persistent atrial fibrillation  Type 2 diabetes mellitus without complication, without long-term current use of insulin  Hyperlipemia  MI, old  Hypertension  S/P coronary artery bypass graft x 3  Pacemaker  H/O prostate biopsy  History of cholecystectomy  Artificial cardiac pacemaker  Stented coronary artery  H/O coronary angioplasty: 4 stents  S/P CABG x 3    SOCIAL HISTORY:  Former smoker, smoked for 30 years 1PPD, quit 30 years ago     FAMILY HISTORY: father MI at 72, sister breast cancer     HOME MEDICATION:   Carvedilol 25 mg, 1 tablet, twice a day  Pantoprazole 40 mg, 1 tablet, twice a day  Tamulosin 0.4 mg, 1 tablet, twice a day  Xarelto 2.5 mg, 1 tablet, twice a day, currently held due to melena  Rosuvastatin, 20 mg, 1 tablet, once a day  Valsartan, 80 mg, 1 tablet, once a day  Sertaline 75 mg, 1.5 tablets, once a day  Montelukast SOD 10 mg, 1 tablet, once a day  Furosemide 40 mg, 1 tablet, once a day  Metformin, 500 mg, twice a day  Aspirin, 81 mg, 1 tablet, once a day  Fish Oil 1000 mg, 2 tablets, twice a day  Vitamin C 100 mg, 1 tablet, once a day  Centrum Silver, 1 tablet, once a day  Iron 65 mg, 1 tablet, once a day  Breo Ellipta, inhaled at night  Nitroglycerin patch (8AM to 8PM)   Sublingual Nitroglycerin 0.4 prn for chest pain      MEDICATIONS  (STANDING):  guaiFENesin  milliGRAM(s) Oral every 12 hours  methylPREDNISolone sodium succinate Injectable 20 milliGRAM(s) IV Push every 8 hours    MEDICATIONS  (PRN):  ALBUTerol    0.083% 2.5 milliGRAM(s) Nebulizer every 4 hours PRN Shortness of Breath and/or Wheezing  guaiFENesin   Syrup  (Sugar-Free) 200 milliGRAM(s) Oral every 6 hours PRN Cough      Allergies    No Known Allergies    Intolerances    REVIEW OF SYSTEMS:    CONSTITUTIONAL: + weakness, no fevers + chills  RESPIRATORY: + cough, + shortness of breath  CARDIOVASCULAR: No chest pain or palpitations  GASTROINTESTINAL: No abdominal pain. No nausea, vomiting  GENITOURINARY: No dysuria, frequency or hematuria  NEUROLOGICAL: No numbness or weakness  SKIN: No itching or rash  All other review of systems is negative unless indicated above    VITAL SIGNS:   Vital Signs Last 24 Hrs  T(C): 36.8 (20 Feb 2020 07:28), Max: 36.8 (20 Feb 2020 07:28)  T(F): 98.2 (20 Feb 2020 07:28), Max: 98.2 (20 Feb 2020 07:28)  HR: 94 (20 Feb 2020 07:28) (94 - 94)  BP: 124/63 (20 Feb 2020 07:28) (124/63 - 124/63)  BP(mean): --  RR: 20 (20 Feb 2020 07:28) (20 - 20)  SpO2: 99% (20 Feb 2020 07:28) (99% - 99%)    I&O's Summary    On Exam:    Constitutional: NAD, alert and oriented x 3  Lungs: diffuse wheezing worse on expiration    Cardiovascular: irregularly irregular, S1 and S2 positive.  No murmurs, rubs, gallops or clicks  Gastrointestinal: soft, nontender.   Psych: Mood & affect appropriate.    LABS: All Labs Reviewed:                        8.4    6.04  )-----------( 110      ( 20 Feb 2020 09:02 )             25.6     20 Feb 2020 09:02    138    |  104    |  44     ----------------------------<  155    4.4     |  24     |  1.70     Ca    9.3        20 Feb 2020 09:02    TPro  6.9    /  Alb  3.4    /  TBili  0.6    /  DBili  x      /  AST  19     /  ALT  15     /  AlkPhos  46     20 Feb 2020 09:02    PT/INR - ( 20 Feb 2020 09:02 )   PT: 14.5 sec;   INR: 1.28 ratio         PTT - ( 20 Feb 2020 09:02 )  PTT:30.6 sec  Blood Culture:   02-20 @ 09:02  Pro Bnp [id number]    02-20 @ 09:02  TSH: 1.29      RADIOLOGY:    CXR: Left subclavian approach pacemaker with leads in the right atrium and right ventricle.    Heart/Mediastinum/Lungs/Other: The heart size is enlarged.The lungs are clear.There are no pleural effusions. Median sternotomy wires.      CT chest:   Pulmonary vascular congestion.  Patchy and nodular opacities in both lungs, particularly in the right lower lobe, likely reflecting pneumonia.      MARLENE (10/2017): 1. Mitral annular calcification, otherwise normal mitral  valve. Mild mitral regurgitation.  2. Calcified trileaflet aortic valve with normal opening.  Mild aortic regurgitation.  3. Moderate aortic root dilatation  (Ao: 4.8 cm at the  sinuses of Valsalva).  Mild non-mobile atherosclerotic  plaque in the aortic arch and descending thoracic aorta.  4. Dilated left atrium.  A Watchman closure device is  present and is well seated at the ostium of the left atrial  appendage (NYA).  Colour Doppler interrogation demonstrates  a small amount of residual flow around the device.  The  width of the joel-device jet is about  2-3 mm by colour  Doppler interrogation.  The joel-device flow occurs on the  medial aspect of the device.  5. Endocardium not well visualized; grossly normal left  ventricular systolic function.  6. Normal right ventricular size and function.  A device  wire is noted in the right heart.  7. Contrast injection demonstrates no evidence of a patent  foramen ovale.    EKG: [facility] - aRman Cjea, Deneen, Aryan, Sloane, Evan Hernandez  Office Number: [phone]    Initial Consult Note    CHIEF COMPLAINT: Patient is a 88y old  Male who presents with a chief complaint of SOB and cough     HPI:  This is a 87 yo male PMH of Afib (s/p Watchman procedure 2017 on Xarelto currently being held as of 2 weeks for blood in stool), remote CABG, PCIX4 last one being 2016, Pacemaker, HTN, HLD, DM2 presenting for cough and SOB. Pt states cough began two days ago, pt was seen by his PCP/cardio Dr. Smith, was prescribed Doxycyline Pt has SOB at baseline, uses O2 at night, states that SOB has worsened over the past two days, worse on exertion and with laying flat. At baseline pt uses two pillows when sleeping, which has not changed, denies any LE swelling. Admits to chills, denies fever, sick contacts, recent travel. Pt did have flu vaccine.       PAST MEDICAL & SURGICAL HISTORY:  Coronary artery disease involving native coronary artery of native heart, angina presence unspecified  Persistent atrial fibrillation  Type 2 diabetes mellitus without complication, without long-term current use of insulin  Hyperlipemia  MI, old  Hypertension  S/P coronary artery bypass graft x 3  Pacemaker  H/O prostate biopsy  History of cholecystectomy  Artificial cardiac pacemaker  Stented coronary artery  H/O coronary angioplasty: 4 stents  S/P CABG x 3    SOCIAL HISTORY:  Former smoker, smoked for 30 years 1PPD, quit 30 years ago     FAMILY HISTORY: father MI at 72, sister breast cancer     HOME MEDICATION:   Carvedilol 25 mg, 1 tablet, twice a day  Pantoprazole 40 mg, 1 tablet, twice a day  Tamulosin 0.4 mg, 1 tablet, twice a day  Xarelto 2.5 mg, 1 tablet, twice a day, currently held due to melena  Rosuvastatin, 20 mg, 1 tablet, once a day  Valsartan, 80 mg, 1 tablet, once a day  Sertaline 75 mg, 1.5 tablets, once a day  Montelukast SOD 10 mg, 1 tablet, once a day  Furosemide 40 mg, 1 tablet, once a day  Metformin, 500 mg, twice a day  Aspirin, 81 mg, 1 tablet, once a day  Fish Oil 1000 mg, 2 tablets, twice a day  Vitamin C 100 mg, 1 tablet, once a day  Centrum Silver, 1 tablet, once a day  Iron 65 mg, 1 tablet, once a day  Breo Ellipta, inhaled at night  Nitroglycerin patch (8AM to 8PM)   Sublingual Nitroglycerin 0.4 prn for chest pain      MEDICATIONS  (STANDING):  guaiFENesin  milliGRAM(s) Oral every 12 hours  methylPREDNISolone sodium succinate Injectable 20 milliGRAM(s) IV Push every 8 hours    MEDICATIONS  (PRN):  ALBUTerol    0.083% 2.5 milliGRAM(s) Nebulizer every 4 hours PRN Shortness of Breath and/or Wheezing  guaiFENesin   Syrup  (Sugar-Free) 200 milliGRAM(s) Oral every 6 hours PRN Cough      Allergies    No Known Allergies    Intolerances    REVIEW OF SYSTEMS:    CONSTITUTIONAL: + weakness, no fevers + chills  RESPIRATORY: + cough, + shortness of breath  CARDIOVASCULAR: No chest pain or palpitations  GASTROINTESTINAL: No abdominal pain. No nausea, vomiting  GENITOURINARY: No dysuria, frequency or hematuria  NEUROLOGICAL: No numbness or weakness  SKIN: No itching or rash  All other review of systems is negative unless indicated above    VITAL SIGNS:   Vital Signs Last 24 Hrs  T(C): 36.8 (20 Feb 2020 07:28), Max: 36.8 (20 Feb 2020 07:28)  T(F): 98.2 (20 Feb 2020 07:28), Max: 98.2 (20 Feb 2020 07:28)  HR: 94 (20 Feb 2020 07:28) (94 - 94)  BP: 124/63 (20 Feb 2020 07:28) (124/63 - 124/63)  BP(mean): --  RR: 20 (20 Feb 2020 07:28) (20 - 20)  SpO2: 99% (20 Feb 2020 07:28) (99% - 99%)    I&O's Summary    On Exam:    Constitutional: NAD, alert and oriented x 3  Lungs: diffuse wheezing worse on expiration    Cardiovascular: irregularly irregular, S1 and S2 positive.  No murmurs, rubs, gallops or clicks  Gastrointestinal: soft, nontender.   Psych: Mood & affect appropriate.    LABS: All Labs Reviewed:                        8.4    6.04  )-----------( 110      ( 20 Feb 2020 09:02 )             25.6     20 Feb 2020 09:02    138    |  104    |  44     ----------------------------<  155    4.4     |  24     |  1.70     Ca    9.3        20 Feb 2020 09:02    TPro  6.9    /  Alb  3.4    /  TBili  0.6    /  DBili  x      /  AST  19     /  ALT  15     /  AlkPhos  46     20 Feb 2020 09:02    PT/INR - ( 20 Feb 2020 09:02 )   PT: 14.5 sec;   INR: 1.28 ratio         PTT - ( 20 Feb 2020 09:02 )  PTT:30.6 sec  Blood Culture:   02-20 @ 09:02  Pro Bnp [id number]    02-20 @ 09:02  TSH: 1.29      RADIOLOGY:    CXR: Left subclavian approach pacemaker with leads in the right atrium and right ventricle.    Heart/Mediastinum/Lungs/Other: The heart size is enlarged.The lungs are clear.There are no pleural effusions. Median sternotomy wires.      CT chest:   Pulmonary vascular congestion.  Patchy and nodular opacities in both lungs, particularly in the right lower lobe, likely reflecting pneumonia.      MARLENE (10/2017): 1. Mitral annular calcification, otherwise normal mitral  valve. Mild mitral regurgitation.  2. Calcified trileaflet aortic valve with normal opening.  Mild aortic regurgitation.  3. Moderate aortic root dilatation  (Ao: 4.8 cm at the  sinuses of Valsalva).  Mild non-mobile atherosclerotic  plaque in the aortic arch and descending thoracic aorta.  4. Dilated left atrium.  A Watchman closure device is  present and is well seated at the ostium of the left atrial  appendage (NYA).  Colour Doppler interrogation demonstrates  a small amount of residual flow around the device.  The  width of the joel-device jet is about  2-3 mm by colour  Doppler interrogation.  The joel-device flow occurs on the  medial aspect of the device.  5. Endocardium not well visualized; grossly normal left  ventricular systolic function.  6. Normal right ventricular size and function.  A device  wire is noted in the right heart.  7. Contrast injection demonstrates no evidence of a patent  foramen ovale.    EKG: AV dual-paced rhythm with frequent premature ventricular complexes [facility] - Raman Ceja, Deneen, Aryan, Sloane, Evan Hernandez  Office Number: [phone]    Initial Consult Note    CHIEF COMPLAINT: Patient is a 88y old  Male who presents with a chief complaint of SOB and cough     HPI:  This is a 89 yo male PMH of Afib (s/p Watchman procedure 2017 on Xarelto currently being held as of 2 weeks for blood in stool), remote CABG, PCIX4 last one being 2016, Pacemaker, HTN, HLD, DM2 presenting for cough and SOB. Pt states cough began two days ago, pt was seen by his PCP/cardio Dr. Smith, was prescribed Doxycyline Pt has SOB at baseline, uses O2 at night, states that SOB has worsened over the past two days, worse on exertion and with laying flat. At baseline pt uses two pillows when sleeping, which has not changed, denies any LE swelling. Admits to chills, denies fever, sick contacts, recent travel. Pt did have flu vaccine.       PAST MEDICAL & SURGICAL HISTORY:  Coronary artery disease involving native coronary artery of native heart, angina presence unspecified  Persistent atrial fibrillation  Type 2 diabetes mellitus without complication, without long-term current use of insulin  Hyperlipemia  MI, old  Hypertension  S/P coronary artery bypass graft x 3  Pacemaker  H/O prostate biopsy  History of cholecystectomy  Artificial cardiac pacemaker  Stented coronary artery  H/O coronary angioplasty: 4 stents  S/P CABG x 3    SOCIAL HISTORY:  Former smoker, smoked for 30 years 1PPD, quit 30 years ago     FAMILY HISTORY: father MI at 72, sister breast cancer     HOME MEDICATION:   Carvedilol 25 mg, 1 tablet, twice a day  Pantoprazole 40 mg, 1 tablet, twice a day  Tamulosin 0.4 mg, 1 tablet, twice a day  Xarelto 2.5 mg, 1 tablet, twice a day, currently held due to melena  Rosuvastatin, 20 mg, 1 tablet, once a day  Valsartan, 80 mg, 1 tablet, once a day  Sertaline 75 mg, 1.5 tablets, once a day  Montelukast SOD 10 mg, 1 tablet, once a day  Furosemide 40 mg, 1 tablet, once a day  Metformin, 500 mg, twice a day  Aspirin, 81 mg, 1 tablet, once a day  Fish Oil 1000 mg, 2 tablets, twice a day  Vitamin C 100 mg, 1 tablet, once a day  Centrum Silver, 1 tablet, once a day  Iron 65 mg, 1 tablet, once a day  Breo Ellipta, inhaled at night  Nitroglycerin patch (8AM to 8PM)   Sublingual Nitroglycerin 0.4 prn for chest pain      MEDICATIONS  (STANDING):  guaiFENesin  milliGRAM(s) Oral every 12 hours  methylPREDNISolone sodium succinate Injectable 20 milliGRAM(s) IV Push every 8 hours    MEDICATIONS  (PRN):  ALBUTerol    0.083% 2.5 milliGRAM(s) Nebulizer every 4 hours PRN Shortness of Breath and/or Wheezing  guaiFENesin   Syrup  (Sugar-Free) 200 milliGRAM(s) Oral every 6 hours PRN Cough      Allergies    No Known Allergies    Intolerances    REVIEW OF SYSTEMS:    CONSTITUTIONAL: + weakness, no fevers + chills  RESPIRATORY: + cough, + shortness of breath  CARDIOVASCULAR: No chest pain or palpitations  GASTROINTESTINAL: No abdominal pain. No nausea, vomiting  GENITOURINARY: No dysuria, frequency or hematuria  NEUROLOGICAL: No numbness or weakness  SKIN: No itching or rash  All other review of systems is negative unless indicated above    VITAL SIGNS:   Vital Signs Last 24 Hrs  T(C): 36.8 (20 Feb 2020 07:28), Max: 36.8 (20 Feb 2020 07:28)  T(F): 98.2 (20 Feb 2020 07:28), Max: 98.2 (20 Feb 2020 07:28)  HR: 94 (20 Feb 2020 07:28) (94 - 94)  BP: 124/63 (20 Feb 2020 07:28) (124/63 - 124/63)  BP(mean): --  RR: 20 (20 Feb 2020 07:28) (20 - 20)  SpO2: 99% (20 Feb 2020 07:28) (99% - 99%)    I&O's Summary    On Exam:    Constitutional: NAD, alert and oriented x 3  HEENT MMM anicteric  Lungs: Decreased breath sounds b/l.  diffuse wheezing worse on expiration   Cardiovascular: irregularly irregular, S1 and S2 positive.  No murmurs, rubs, gallops or clicks  Gastrointestinal: soft, nontender.   lymp: + lower ext edema  Skin no visible rashes or ulcers  Psych: Mood & affect appropriate.    LABS: All Labs Reviewed:                        8.4    6.04  )-----------( 110      ( 20 Feb 2020 09:02 )             25.6     20 Feb 2020 09:02    138    |  104    |  44     ----------------------------<  155    4.4     |  24     |  1.70     Ca    9.3        20 Feb 2020 09:02    TPro  6.9    /  Alb  3.4    /  TBili  0.6    /  DBili  x      /  AST  19     /  ALT  15     /  AlkPhos  46     20 Feb 2020 09:02    PT/INR - ( 20 Feb 2020 09:02 )   PT: 14.5 sec;   INR: 1.28 ratio         PTT - ( 20 Feb 2020 09:02 )  PTT:30.6 sec  Blood Culture:   02-20 @ 09:02  Pro Bnp [id number]    02-20 @ 09:02  TSH: 1.29      RADIOLOGY:    CXR: Left subclavian approach pacemaker with leads in the right atrium and right ventricle.    Heart/Mediastinum/Lungs/Other: The heart size is enlarged.The lungs are clear.There are no pleural effusions. Median sternotomy wires.      CT chest:   Pulmonary vascular congestion.  Patchy and nodular opacities in both lungs, particularly in the right lower lobe, likely reflecting pneumonia.      MARLENE (10/2017): 1. Mitral annular calcification, otherwise normal mitral  valve. Mild mitral regurgitation.  2. Calcified trileaflet aortic valve with normal opening.  Mild aortic regurgitation.  3. Moderate aortic root dilatation  (Ao: 4.8 cm at the  sinuses of Valsalva).  Mild non-mobile atherosclerotic  plaque in the aortic arch and descending thoracic aorta.  4. Dilated left atrium.  A Watchman closure device is  present and is well seated at the ostium of the left atrial  appendage (NYA).  Colour Doppler interrogation demonstrates  a small amount of residual flow around the device.  The  width of the joel-device jet is about  2-3 mm by colour  Doppler interrogation.  The joel-device flow occurs on the  medial aspect of the device.  5. Endocardium not well visualized; grossly normal left  ventricular systolic function.  6. Normal right ventricular size and function.  A device  wire is noted in the right heart.  7. Contrast injection demonstrates no evidence of a patent  foramen ovale.    EKG: AV dual-paced rhythm with frequent premature ventricular complexes

## 2020-02-20 NOTE — CONSULT NOTE ADULT - SUBJECTIVE AND OBJECTIVE BOX
Date/Time Patient Seen:  		  Referring MD:   Data Reviewed	       Patient is a 88y old  Male who presents with a chief complaint of     Subjective/HPI  in bed  seen and examined  vs and meds reviewed  labs reviewed  H and P reviewed  ER provider note reviewed  old records reviewed  follows with cardio and pulm as outpatient  Dr. Gonzalez in [facility]     ex smoker    retired  exp to asbestos +  lives at home  has children  alert  still drives    on home o2 at night -          PAST MEDICAL & SURGICAL HISTORY:  Coronary artery disease involving native coronary artery of native heart, angina presence unspecified  Persistent atrial fibrillation  Type 2 diabetes mellitus without complication, without long-term current use of insulin  Hyperlipemia  MI, old  Hypertension  Sepsis  S/P coronary artery bypass graft x 3  Pacemaker  H/O prostate biopsy  History of cholecystectomy  Artificial cardiac pacemaker  Stented coronary artery  H/O coronary angioplasty: 4 stents  S/P CABG x 3  No significant past surgical history        Medication list         MEDICATIONS  (STANDING):  azithromycin  IVPB 500 milliGRAM(s) IV Intermittent once    MEDICATIONS  (PRN):         Vitals log        ICU Vital Signs Last 24 Hrs  T(C): 36.8 (20 Feb 2020 07:28), Max: 36.8 (20 Feb 2020 07:28)  T(F): 98.2 (20 Feb 2020 07:28), Max: 98.2 (20 Feb 2020 07:28)  HR: 94 (20 Feb 2020 07:28) (94 - 94)  BP: 124/63 (20 Feb 2020 07:28) (124/63 - 124/63)  BP(mean): --  ABP: --  ABP(mean): --  RR: 20 (20 Feb 2020 07:28) (20 - 20)  SpO2: 99% (20 Feb 2020 07:28) (99% - 99%)           Input and Output:  I&O's Detail      Lab Data                        8.4    6.04  )-----------( 110      ( 20 Feb 2020 09:02 )             25.6     02-20    138  |  104  |  44<H>  ----------------------------<  155<H>  4.4   |  24  |  1.70<H>    Ca    9.3      20 Feb 2020 09:02    TPro  6.9  /  Alb  3.4  /  TBili  0.6  /  DBili  x   /  AST  19  /  ALT  15  /  AlkPhos  46  02-20            Review of Systems	  sob and power for 3 days      Objective     Physical Examination  heart s1s2  lung dec BS  abd soft  head nc  verbal  alert  on o2 support        Pertinent Lab findings & Imaging      Smith:  NO   Adequate UO     I&O's Detail           Discussed with:     Cultures:	        Radiology  EXAM:  XR CHEST PORTABLE URGENT 1V                            PROCEDURE DATE:  02/20/2020          INTERPRETATION:  Clinical indication:  SOB    Technique: XR CHEST URGENT portable AP    Comparison:8/23/2017    Findings:  Lines: Left subclavian approach pacemaker with leads in the right atrium and right ventricle.    Heart/Mediastinum/Lungs/Other: The heart size is enlarged.The lungs are clear.There are no pleural effusions. Median sternotomy wires.    Impression:    As above.                SHEY PORTILLO M.D. ATTENDING RADIOLOGIST  This document has been electronically signed. Feb 20 2020  9:30AM                Patient name: ANNAMARIA AVALOS  YOB: 1931   Age: 86 (M)   MR#: [id number]  Study Date: 10/12/2017  Location: O/PSonographer: Gomez Reid M.D.  Study quality: Technically Difficult  Referring Physician: Weston Gray MD  Blood Pressure: 144/74 mmHg  Height: 188 cm  Weight: 91 kg  BSA: 2.2 m2  ------------------------------------------------------------------------  PROCEDURE: Transesophageal (MARLENE) was performed in the  echocardiography laboratory.  Informed consent was first  obtained for MARLENE.  The patient was sedated by Anesthesia.  The procedure was monitored with automatic blood pressure  monitoring, ECG tracings and pulse oximetry.  Gag reflex  was abolished with topical Cetacaine and the  transesophageal probe was placed in the esophagus posterior  to the heart without complications.  The patient tolerated  the procedure well.   Real-time and reconstructed  3-dimensional imaging was performed.  Color Doppler  analysis was carried out using both 2D and 3D mapping.  Patient was injected with 10 cc's of aerosolized saline.  INDICATION: Unspecified atrial fibrillation (I48.91)  ------------------------------------------------------------------------  OBSERVATIONS:  Mitral Valve: Mitral annular calcification, otherwise  normal mitral valve. Mild mitral regurgitation.  Aortic Root: Moderate aortic root dilatation  (Ao: 4.8 cm  at the sinuses of Valsalva).  Mild non-mobile  atherosclerotic plaque in the aortic arch and descending  thoracic aorta.  Aortic Valve: Calcified trileaflet aortic valve with normal  opening. Mild aortic regurgitation.  Left Atrium: Dilated left atrium.  A Watchman closure  device is present and is well seated at the ostium of the  left atrial appendage (NYA).  Colour Doppler interrogation  demonstrates a small amount of residual flow around the  device.  The width of the joel-device jet is about  2-3 mm  by colour Doppler interrogation.  The joel-device flow  occurs on the medial aspect of the device.  Left Ventricle: Endocardium not well visualized; grossly  normal left ventricular systolic function.  Right Heart: Normal right atrium. Normal right ventricular  size and function.  A device wire is noted in the right  heart. Normal tricuspid valve.  Minimal tricuspid  regurgitation. Normal pulmonic valve.  Pericardium/PleuraNormal pericardium with no pericardial  effusion.  ------------------------------------------------------------------------  CONCLUSIONS:  1. Mitral annular calcification, otherwise normal mitral  valve. Mild mitral regurgitation.  2. Calcified trileaflet aortic valve with normal opening.  Mild aortic regurgitation.  3. Moderate aortic root dilatation  (Ao: 4.8 cm at the  sinuses of Valsalva).  Mild non-mobile atherosclerotic  plaque in the aortic arch and descending thoracic aorta.  4. Dilated left atrium.  A Watchman closure device is  present and is well seated at the ostium of the left atrial  appendage (NYA).  Colour Doppler interrogation demonstrates  a small amount of residual flow around the device.  The  width of the joel-device jet is about  2-3 mm by colour  Doppler interrogation.  The joel-device flow occurs on the  medial aspect of the device.  5. Endocardium not well visualized; grossly normal left  ventricular systolic function.  6. Normal right ventricular size and function.  A device  wire is noted in the right heart.  7. Contrast injection demonstrates no evidence of a patent  foramen ovale.  ------------------------------------------------------------------------  Confirmed on  10/12/2017 - 14:41:46 by Mario Alberto Villareal M.D.

## 2020-02-20 NOTE — H&P ADULT - PROBLEM SELECTOR PLAN 6
chronic, stable  cont home meds  monitor on tele  apprec cardio recs Dr. chery  check echo  not on ac due to gi bleed workup underway

## 2020-02-20 NOTE — ED PROVIDER NOTE - CLINICAL SUMMARY MEDICAL DECISION MAKING FREE TEXT BOX
cough, congestion, chills, on doxycycline, f/u labs, cutures, ekg, chest xray, iv fluids, abx, steroids, duoneb, admit

## 2020-02-20 NOTE — ED ADULT TRIAGE NOTE - INTERNATIONAL TRAVEL
I, Ruma Trinh, performed a face to face bedside interview with this patient regarding history of present illness, review of symptoms and relevant past medical, social and family history.  I completed an independent physical examination. Medical decision making, follow-up on ordered tests (ie labs, radiologic studies) and re-evaluation of the patient's status has been communicated to the ACP.  Disposition of the patient will be based on test outcome and response to ED interventions.     33yo F with vaignal bleeding, no CP/SOB/syncope. ~ 5 weeks pregnant. mild abd cramping. no OB care yet.     US, labs, UA r/o miscarriage/ectopic
No

## 2020-02-20 NOTE — ED ADULT NURSE NOTE - OBJECTIVE STATEMENT
Pt received in bed alert and oriented and resting with the c/o weakness, SOB, wheezing and difficulty breathing that started today. As per Md's orders IV turner placed blood specimen obtained and sent to the. Pt stable with IVF infusing well and nursing care ongoing and safety maintained.

## 2020-02-20 NOTE — ED PROVIDER NOTE - CARE PLAN
Principal Discharge DX:	Pneumonia due to infectious organism, unspecified laterality, unspecified part of lung  Secondary Diagnosis:	Anemia, unspecified type  Secondary Diagnosis:	COPD exacerbation

## 2020-02-20 NOTE — H&P ADULT - NSICDXFAMILYHX_GEN_ALL_CORE_FT
FAMILY HISTORY:  Family history of breast cancer in sister  Family history of cardiomegaly, mother  FH: MI (myocardial infarction), in father due to freon exposure in work in [place]

## 2020-02-20 NOTE — ED PROVIDER NOTE - OBJECTIVE STATEMENT
88 male presents to ER by ambulance with report of cough, congestion, difficulty breathing, o2 sat 94% room air improved with O2 non rebreather. Patient states he has been having cough, congestion for past 3 weeks, seemed to be getting worse in the past 3 day, went to see PMD Dr. Harrison, prescribed doxycycline 100mg po bid, yesterday had increased generalized fatigue, increased productive cough, wheezing, decreased appetite, vomited x 1. Over night patient having increased work of breathing and night aide called ambulance.

## 2020-02-20 NOTE — CONSULT NOTE ADULT - ASSESSMENT
88 white male with a history of HTN, CAD, CHF, CABG, AF, DM. PVD, CKD now admitted with a history of SOB, LEE and cough with congestion.   GILBERT on CKD most likely due to ATN secondary to sepsis complicated by ARB and diuretics. Will continue to hold the diuretics and also ARB for now.   Will obtain a renal sonogram. spoke to family at bed side.

## 2020-02-20 NOTE — H&P ADULT - NSHPOUTPATIENTPROVIDERS_GEN_ALL_CORE
PMD/Cardio [facility]-Dr Olmstead  GI-Dr Smith*has an appt 3/6/2020 due to gi bleed, stopped eliquis for this reason

## 2020-02-20 NOTE — H&P ADULT - NSICDXPASTMEDICALHX_GEN_ALL_CORE_FT
PAST MEDICAL HISTORY:  Coronary artery disease involving native coronary artery of native heart, angina presence unspecified     Hyperlipemia     Hypertension     MI, old     Pacemaker checked at pmd/cardio office 1 week ago    Persistent atrial fibrillation wwas on eliquis, stopped due to gi bleed workup underway    S/P coronary artery bypass graft x 3     Sepsis sec to prostate bx    Type 2 diabetes mellitus without complication, without long-term current use of insulin

## 2020-02-20 NOTE — CONSULT NOTE ADULT - ASSESSMENT
This is a 89 yo male PMH of Afib (s/p Watchman procedure 2017 on Xarelto currently being held as of 2 weeks for blood in stool), remote CABG, PCIX4 last one being 2016, Pacemaker, HTN, HLD, DM2 presenting for cough and SOB. This is a 89 yo male PMH of Afib (s/p Watchman procedure 2017 on Xarelto currently being held as of 2 weeks for blood in stool), remote CABG, PCIX4 last one being 2016, Pacemaker, HTN, HLD, DM2 presenting for cough and SOB.    -SOB likely multifactorial given PNA/CHF/anemia   -CT chest with pulmonary congestion and PNA   -Will given Lasix 40 IV X1 now, reassess daily   -Continue with steroids, abx nebs as per pulm and primary team   -continue to hold Xarelto for blood in stool, Xarelto held by pts primary cardiologist   -continue home carvedilol BID   -pt on nitroglycerin patch outpatient recommend to continue 87 yo male PMH of Afib (s/p Watchman procedure 2017 on Xarelto currently being held as of 2 weeks for blood in stool), remote CABG, PCIX4 last one being 2016, Pacemaker, HTN, HLD, DM2 presenting for cough and SOB.    -SOB likely multifactorial given PNA/CHF/anemia   -CT chest with pulmonary congestion and PNA   -Will given Lasix 40 IV X1 now, reassess daily   - Please continue to maintain strict I/Os, monitor daily weights, Cr, and K.   - echo      -Continue with steroids, abx nebs as per pulm and primary team     - He is on low dose Xarelto for likley CAD   -continue to hold Xarelto for melena  - CAD appears to be stable.   - EKG without sig ischemic changes  -pt on nitroglycerin patch outpatient recommend to continue    - AF is controlled  -continue home carvedilol BID    - monitor tele    - Further cardiac workup will depend on clinical course.   - All other workup per primary team. Will followup.

## 2020-02-21 DIAGNOSIS — N17.9 ACUTE KIDNEY FAILURE, UNSPECIFIED: ICD-10-CM

## 2020-02-21 DIAGNOSIS — R09.89 OTHER SPECIFIED SYMPTOMS AND SIGNS INVOLVING THE CIRCULATORY AND RESPIRATORY SYSTEMS: ICD-10-CM

## 2020-02-21 LAB
ALBUMIN SERPL ELPH-MCNC: 3.5 G/DL — SIGNIFICANT CHANGE UP (ref 3.3–5)
ALP SERPL-CCNC: 51 U/L — SIGNIFICANT CHANGE UP (ref 40–120)
ALT FLD-CCNC: 26 U/L — SIGNIFICANT CHANGE UP (ref 12–78)
ANION GAP SERPL CALC-SCNC: 9 MMOL/L — SIGNIFICANT CHANGE UP (ref 5–17)
AST SERPL-CCNC: 46 U/L — HIGH (ref 15–37)
BASOPHILS # BLD AUTO: 0.01 K/UL — SIGNIFICANT CHANGE UP (ref 0–0.2)
BASOPHILS NFR BLD AUTO: 0.1 % — SIGNIFICANT CHANGE UP (ref 0–2)
BILIRUB SERPL-MCNC: 0.4 MG/DL — SIGNIFICANT CHANGE UP (ref 0.2–1.2)
BUN SERPL-MCNC: 54 MG/DL — HIGH (ref 7–23)
CALCIUM SERPL-MCNC: 9.1 MG/DL — SIGNIFICANT CHANGE UP (ref 8.5–10.1)
CHLORIDE SERPL-SCNC: 105 MMOL/L — SIGNIFICANT CHANGE UP (ref 96–108)
CO2 SERPL-SCNC: 24 MMOL/L — SIGNIFICANT CHANGE UP (ref 22–31)
CREAT SERPL-MCNC: 1.6 MG/DL — HIGH (ref 0.5–1.3)
EOSINOPHIL # BLD AUTO: 0 K/UL — SIGNIFICANT CHANGE UP (ref 0–0.5)
EOSINOPHIL NFR BLD AUTO: 0 % — SIGNIFICANT CHANGE UP (ref 0–6)
GLUCOSE SERPL-MCNC: 179 MG/DL — HIGH (ref 70–99)
HBA1C BLD-MCNC: 6.4 % — HIGH (ref 4–5.6)
HCT VFR BLD CALC: 27.4 % — LOW (ref 39–50)
HGB BLD-MCNC: 9.1 G/DL — LOW (ref 13–17)
IMM GRANULOCYTES NFR BLD AUTO: 1 % — SIGNIFICANT CHANGE UP (ref 0–1.5)
LYMPHOCYTES # BLD AUTO: 0.54 K/UL — LOW (ref 1–3.3)
LYMPHOCYTES # BLD AUTO: 6.7 % — LOW (ref 13–44)
MAGNESIUM SERPL-MCNC: 2.3 MG/DL — SIGNIFICANT CHANGE UP (ref 1.6–2.6)
MCHC RBC-ENTMCNC: 30.4 PG — SIGNIFICANT CHANGE UP (ref 27–34)
MCHC RBC-ENTMCNC: 33.2 GM/DL — SIGNIFICANT CHANGE UP (ref 32–36)
MCV RBC AUTO: 91.6 FL — SIGNIFICANT CHANGE UP (ref 80–100)
MONOCYTES # BLD AUTO: 0.71 K/UL — SIGNIFICANT CHANGE UP (ref 0–0.9)
MONOCYTES NFR BLD AUTO: 8.8 % — SIGNIFICANT CHANGE UP (ref 2–14)
NEUTROPHILS # BLD AUTO: 6.76 K/UL — SIGNIFICANT CHANGE UP (ref 1.8–7.4)
NEUTROPHILS NFR BLD AUTO: 83.4 % — HIGH (ref 43–77)
NRBC # BLD: 0 /100 WBCS — SIGNIFICANT CHANGE UP (ref 0–0)
PLATELET # BLD AUTO: 111 K/UL — LOW (ref 150–400)
POTASSIUM SERPL-MCNC: 4.8 MMOL/L — SIGNIFICANT CHANGE UP (ref 3.5–5.3)
POTASSIUM SERPL-SCNC: 4.8 MMOL/L — SIGNIFICANT CHANGE UP (ref 3.5–5.3)
PROT SERPL-MCNC: 7.4 G/DL — SIGNIFICANT CHANGE UP (ref 6–8.3)
RBC # BLD: 2.99 M/UL — LOW (ref 4.2–5.8)
RBC # FLD: 14.5 % — SIGNIFICANT CHANGE UP (ref 10.3–14.5)
SODIUM SERPL-SCNC: 138 MMOL/L — SIGNIFICANT CHANGE UP (ref 135–145)
WBC # BLD: 8.1 K/UL — SIGNIFICANT CHANGE UP (ref 3.8–10.5)
WBC # FLD AUTO: 8.1 K/UL — SIGNIFICANT CHANGE UP (ref 3.8–10.5)

## 2020-02-21 PROCEDURE — 99232 SBSQ HOSP IP/OBS MODERATE 35: CPT

## 2020-02-21 PROCEDURE — 99233 SBSQ HOSP IP/OBS HIGH 50: CPT

## 2020-02-21 RX ORDER — FUROSEMIDE 40 MG
20 TABLET ORAL DAILY
Refills: 0 | Status: DISCONTINUED | OUTPATIENT
Start: 2020-02-21 | End: 2020-02-21

## 2020-02-21 RX ORDER — IPRATROPIUM/ALBUTEROL SULFATE 18-103MCG
3 AEROSOL WITH ADAPTER (GRAM) INHALATION EVERY 6 HOURS
Refills: 0 | Status: DISCONTINUED | OUTPATIENT
Start: 2020-02-21 | End: 2020-02-26

## 2020-02-21 RX ORDER — FUROSEMIDE 40 MG
40 TABLET ORAL ONCE
Refills: 0 | Status: COMPLETED | OUTPATIENT
Start: 2020-02-21 | End: 2020-02-21

## 2020-02-21 RX ADMIN — Medication 500 MILLIGRAM(S): at 12:33

## 2020-02-21 RX ADMIN — Medication 600 MILLIGRAM(S): at 18:42

## 2020-02-21 RX ADMIN — Medication 1 TABLET(S): at 12:33

## 2020-02-21 RX ADMIN — ALBUTEROL 2.5 MILLIGRAM(S): 90 AEROSOL, METERED ORAL at 06:01

## 2020-02-21 RX ADMIN — Medication 30 MILLIGRAM(S): at 12:33

## 2020-02-21 RX ADMIN — Medication 81 MILLIGRAM(S): at 12:33

## 2020-02-21 RX ADMIN — Medication 3 MILLILITER(S): at 20:04

## 2020-02-21 RX ADMIN — PANTOPRAZOLE SODIUM 40 MILLIGRAM(S): 20 TABLET, DELAYED RELEASE ORAL at 18:42

## 2020-02-21 RX ADMIN — CARVEDILOL PHOSPHATE 25 MILLIGRAM(S): 80 CAPSULE, EXTENDED RELEASE ORAL at 18:42

## 2020-02-21 RX ADMIN — TAMSULOSIN HYDROCHLORIDE 0.4 MILLIGRAM(S): 0.4 CAPSULE ORAL at 05:22

## 2020-02-21 RX ADMIN — Medication 2: at 17:15

## 2020-02-21 RX ADMIN — Medication 3 MILLILITER(S): at 13:32

## 2020-02-21 RX ADMIN — SERTRALINE 50 MILLIGRAM(S): 25 TABLET, FILM COATED ORAL at 12:33

## 2020-02-21 RX ADMIN — Medication 1: at 12:32

## 2020-02-21 RX ADMIN — CARVEDILOL PHOSPHATE 25 MILLIGRAM(S): 80 CAPSULE, EXTENDED RELEASE ORAL at 05:22

## 2020-02-21 RX ADMIN — Medication 200 MILLIGRAM(S): at 05:21

## 2020-02-21 RX ADMIN — Medication 20 MILLIGRAM(S): at 03:46

## 2020-02-21 RX ADMIN — CEFTRIAXONE 100 MILLIGRAM(S): 500 INJECTION, POWDER, FOR SOLUTION INTRAMUSCULAR; INTRAVENOUS at 09:30

## 2020-02-21 RX ADMIN — Medication 325 MILLIGRAM(S): at 12:34

## 2020-02-21 RX ADMIN — Medication 600 MILLIGRAM(S): at 05:22

## 2020-02-21 RX ADMIN — AZITHROMYCIN 255 MILLIGRAM(S): 500 TABLET, FILM COATED ORAL at 10:20

## 2020-02-21 RX ADMIN — PANTOPRAZOLE SODIUM 40 MILLIGRAM(S): 20 TABLET, DELAYED RELEASE ORAL at 05:22

## 2020-02-21 RX ADMIN — TAMSULOSIN HYDROCHLORIDE 0.4 MILLIGRAM(S): 0.4 CAPSULE ORAL at 18:42

## 2020-02-21 RX ADMIN — Medication 40 MILLIGRAM(S): at 14:35

## 2020-02-21 RX ADMIN — MONTELUKAST 10 MILLIGRAM(S): 4 TABLET, CHEWABLE ORAL at 12:33

## 2020-02-21 RX ADMIN — Medication 1: at 08:21

## 2020-02-21 NOTE — PHYSICAL THERAPY INITIAL EVALUATION ADULT - PERTINENT HX OF CURRENT PROBLEM, REHAB EVAL
87 yo M with pmh chronic afib was on eliquis but stopped 2 weeks ago due to gi bleed, htn. hld, CABG x3, CAD, sepsis sec to prostate bx, DM2, prev MI, PPM recently checked at cardio/pmd off states was ok who p/w with 4 days of worsening weakness, cough with yellowish sputum production and minimal improvement despite being rx doxycycline 3 days ago. Pt also has some shortness of breath and worsening wheezing. Denies any other acute complaints at this time.

## 2020-02-21 NOTE — PROGRESS NOTE ADULT - ASSESSMENT
89 yo male PMH of Afib (s/p Watchman procedure 2017 on Xarelto currently being held as of 2 weeks for blood in stool), remote CABG, PCIX4 last one being 2016, Pacemaker, HTN, HLD, DM2 presenting for cough and SOB.    SOB  - SOB likely multifactorial given PNA/CHF/anemia  - He remains dyspneic and with circumoral cyanosis after activity  - CT chest with pulmonary congestion and PNA; Pro-BNP=>4500  - Give Lasix 40 gm IV x 1today  - Please continue to maintain strict I/Os, monitor daily weights, Cr, and K.   - Follow up TTE  -Continue with steroids, abx, nebs as per pulm and primary team   - Initiate incentive spirometer    Afib s/p Watchman device (2017)  - Remained on Xarelto PTA.  He follows with Dr. Gonzalez ([facility])  - Continue to hold Xarelto for melena  - Monitor h/h.  Transfuse to keep Hgb>8  - MOnitor electrolytes, replete to keep K>4 and Mag>2\  - Continue Coreg    CAD s/p CABG  - CAD appears to be stable.   - EKG without sig ischemic changes  - pt on nitroglycerin patch outpatient recommend to continue  - Continue ASA  - Unsure why not on statin  - Continue BB  - Not on ACEI/ARB due to GILBERT    GILBERT  - Monitor renal indices  - Avoid nephrotoxics  - Renal following    GIB  - Follow FOBT  - Follow GI eval  - Continue to trend Hgb    Further cardiac workup will depend on clinical course.   All other workup per primary team. Will followup.     Christen Ma DNP, NP-C  Cardiology   Spectra #[phone]

## 2020-02-21 NOTE — PROGRESS NOTE ADULT - ASSESSMENT
87 yo M with pmh chronic afib was on eliquis but stopped 2 weeks ago due to gi bleed, htn. hld, CABG x3, CAD, sepsis sec to prostate bx, DM2, prev MI, COPD not on home O2,  PPM recently checked at cardio/pmd off states was ok who p/w with 4 days of worsening weakness, cough with yellowish sputum production and minimal improvement despite being rx doxycycline 3 days ago admitted with acute copd exacerbation, suspected CHF exacerbation, and RLL PNA, CAP

## 2020-02-21 NOTE — PROGRESS NOTE ADULT - ASSESSMENT
88 white male with a history of HTN, CAD, CHF, CABG, AF, DM. PVD, CKD now admitted with a history of SOB, LEE and cough with congestion.   GILBERT on CKD most likely due to ATN secondary to sepsis complicated by ARB and diuretics. Will continue to hold the diuretics and also ARB for now.   renal sonogram shows chronic echogenicity indicating mild CKD. spoke to family at bed side.

## 2020-02-21 NOTE — PHARMACOTHERAPY INTERVENTION NOTE - COMMENTS
Patient on IV Zithromax for PNA.  Patient completed 2 days therapy.  Recommended to add stop date for 2/22 after 3rd dose for completion of therapy.  MD agreed.

## 2020-02-21 NOTE — PROGRESS NOTE ADULT - SUBJECTIVE AND OBJECTIVE BOX
Patient is a 88y old  Male who presents with a chief complaint of shortness of breath (2020 12:13)      INTERVAL HPI/OVERNIGHT EVENTS: Patient seen and examined at bedside. No overnight events occurred. Patient states that he still has shortness of breath and a productive cough, however he feels slightly better overall. He denies fever, chills, headache, chest pain, nausea, vomiting, diarrhea, constipation.     MEDICATIONS  (STANDING):  albuterol/ipratropium for Nebulization. 3 milliLiter(s) Nebulizer every 6 hours  ascorbic acid 500 milliGRAM(s) Oral daily  aspirin  chewable 81 milliGRAM(s) Oral daily  azithromycin  IVPB 500 milliGRAM(s) IV Intermittent every 24 hours  carvedilol 25 milliGRAM(s) Oral every 12 hours  cefTRIAXone   IVPB 1000 milliGRAM(s) IV Intermittent every 24 hours  dextrose 5%. 1000 milliLiter(s) (50 mL/Hr) IV Continuous <Continuous>  dextrose 50% Injectable 12.5 Gram(s) IV Push once  dextrose 50% Injectable 25 Gram(s) IV Push once  dextrose 50% Injectable 25 Gram(s) IV Push once  ferrous    sulfate 325 milliGRAM(s) Oral daily  guaiFENesin  milliGRAM(s) Oral every 12 hours  insulin lispro (HumaLOG) corrective regimen sliding scale   SubCutaneous three times a day before meals  insulin lispro (HumaLOG) corrective regimen sliding scale   SubCutaneous at bedtime  montelukast 10 milliGRAM(s) Oral daily  multivitamin/minerals 1 Tablet(s) Oral daily  pantoprazole    Tablet 40 milliGRAM(s) Oral two times a day  predniSONE   Tablet 30 milliGRAM(s) Oral daily  sertraline 50 milliGRAM(s) Oral daily  tamsulosin 0.4 milliGRAM(s) Oral two times a day    MEDICATIONS  (PRN):  dextrose 40% Gel 15 Gram(s) Oral once PRN Blood Glucose LESS THAN 70 milliGRAM(s)/deciliter  glucagon  Injectable 1 milliGRAM(s) IntraMuscular once PRN Glucose LESS THAN 70 milligrams/deciliter  guaiFENesin   Syrup  (Sugar-Free) 200 milliGRAM(s) Oral every 6 hours PRN Cough      Allergies    No Known Allergies    Intolerances        REVIEW OF SYSTEMS:  CONSTITUTIONAL: No fever or chills  HEENT:  No headache, no sore throat  RESPIRATORY: Admits shortness of breath, productive cough, wheezing.   CARDIOVASCULAR: No chest pain, palpitations  GASTROINTESTINAL: No abd pain, nausea, vomiting, or diarrhea  GENITOURINARY: No dysuria, frequency, or hematuria  NEUROLOGICAL: no focal weakness or dizziness  MUSCULOSKELETAL: no myalgias     Vital Signs Last 24 Hrs  T(C): 36.4 (2020 12:25), Max: 37.3 (2020 13:05)  T(F): 97.6 (2020 12:25), Max: 99.2 (2020 13:05)  HR: 65 (2020 12:25) (65 - 79)  BP: 144/64 (2020 12:25) (128/71 - 160/72)  BP(mean): --  RR: 20 (2020 12:25) (20 - 25)  SpO2: 98% (:25) (92% - 98%)    PHYSICAL EXAM:  GENERAL: NAD, resting comfortably in bed.   HEENT:  anicteric, moist mucous membranes, EOMI  CHEST/LUNG:  coarse breath sounds, rales and rhonchi b/l R>L  HEART:  RRR, S1, S2, no murmurs appreciated  ABDOMEN:  BS+, soft, nontender, nondistended  EXTREMITIES: no edema, cyanosis, or calf tenderness  NERVOUS SYSTEM: answers questions and follows commands appropriately    LABS:                        9.1    8.10  )-----------( 111      ( 2020 07:03 )             27.4     CBC Full  -  ( 2020 07:03 )  WBC Count : 8.10 K/uL  Hemoglobin : 9.1 g/dL  Hematocrit : 27.4 %  Platelet Count - Automated : 111 K/uL  Mean Cell Volume : 91.6 fl  Mean Cell Hemoglobin : 30.4 pg  Mean Cell Hemoglobin Concentration : 33.2 gm/dL  Auto Neutrophil # : 6.76 K/uL  Auto Lymphocyte # : 0.54 K/uL  Auto Monocyte # : 0.71 K/uL  Auto Eosinophil # : 0.00 K/uL  Auto Basophil # : 0.01 K/uL  Auto Neutrophil % : 83.4 %  Auto Lymphocyte % : 6.7 %  Auto Monocyte % : 8.8 %  Auto Eosinophil % : 0.0 %  Auto Basophil % : 0.1 %    2020 07:03    138    |  105    |  54     ----------------------------<  179    4.8     |  24     |  1.60     Ca    9.1        2020 07:03  Mg     2.3       2020 07:03    TPro  7.4    /  Alb  3.5    /  TBili  0.4    /  DBili  x      /  AST  46     /  ALT  26     /  AlkPhos  51     2020 07:03    PT/INR - ( 2020 09:02 )   PT: 14.5 sec;   INR: 1.28 ratio         PTT - ( 2020 09:02 )  PTT:30.6 sec  Urinalysis Basic - ( 2020 19:24 )    Color: Yellow / Appearance: Clear / S.010 / pH: x  Gluc: x / Ketone: Negative  / Bili: Negative / Urobili: Negative   Blood: x / Protein: 15 / Nitrite: Negative   Leuk Esterase: Negative / RBC: 0-2 /HPF / WBC 0-2   Sq Epi: x / Non Sq Epi: Occasional / Bacteria: Occasional      CAPILLARY BLOOD GLUCOSE      POCT Blood Glucose.: 185 mg/dL (2020 11:56)  POCT Blood Glucose.: 179 mg/dL (2020 07:57)  POCT Blood Glucose.: 213 mg/dL (2020 21:49)  POCT Blood Glucose.: 256 mg/dL (2020 16:54)          Consultant(s) Notes Reviewed:  [x] YES  [ ] NO

## 2020-02-21 NOTE — PROGRESS NOTE ADULT - PROBLEM SELECTOR PLAN 2
acute on chronic, uses 2L O2 at night  keeps sats above 88%  Pulmonology consulted, recs appreciated  cont nebs and steroids

## 2020-02-21 NOTE — PROGRESS NOTE ADULT - SUBJECTIVE AND OBJECTIVE BOX
[facility] -- Raman Ceja, Deneen, Aryan, David Anton Savella, Goodger  Office # [phone]    Follow Up:  SOB    Subjective/Observations: Awake and alert, on NC.  +LEE with circumoral cyanosis with activity.  No tele events    REVIEW OF SYSTEMS: All other review of systems is negative unless indicated above  PAST MEDICAL & SURGICAL HISTORY:  Coronary artery disease involving native coronary artery of native heart, angina presence unspecified  Persistent atrial fibrillation: wwas on eliquis, stopped due to gi bleed workup underway  Type 2 diabetes mellitus without complication, without long-term current use of insulin  Hyperlipemia  MI, old  Hypertension  Sepsis: sec to prostate bx  S/P coronary artery bypass graft x 3  Pacemaker: checked at pmd/cardio office 1 week ago  Presence of Watchman left atrial appendage closure device  H/O prostate biopsy  History of cholecystectomy  Artificial cardiac pacemaker  Stented coronary artery  H/O coronary angioplasty: 4 stents  S/P CABG x 3    MEDICATIONS  (STANDING):  albuterol/ipratropium for Nebulization. 3 milliLiter(s) Nebulizer every 6 hours  ascorbic acid 500 milliGRAM(s) Oral daily  aspirin  chewable 81 milliGRAM(s) Oral daily  azithromycin  IVPB 500 milliGRAM(s) IV Intermittent every 24 hours  carvedilol 25 milliGRAM(s) Oral every 12 hours  cefTRIAXone   IVPB 1000 milliGRAM(s) IV Intermittent every 24 hours  dextrose 5%. 1000 milliLiter(s) (50 mL/Hr) IV Continuous <Continuous>  dextrose 50% Injectable 12.5 Gram(s) IV Push once  dextrose 50% Injectable 25 Gram(s) IV Push once  dextrose 50% Injectable 25 Gram(s) IV Push once  ferrous    sulfate 325 milliGRAM(s) Oral daily  guaiFENesin  milliGRAM(s) Oral every 12 hours  insulin lispro (HumaLOG) corrective regimen sliding scale   SubCutaneous three times a day before meals  insulin lispro (HumaLOG) corrective regimen sliding scale   SubCutaneous at bedtime  montelukast 10 milliGRAM(s) Oral daily  multivitamin/minerals 1 Tablet(s) Oral daily  pantoprazole    Tablet 40 milliGRAM(s) Oral two times a day  predniSONE   Tablet 30 milliGRAM(s) Oral daily  sertraline 50 milliGRAM(s) Oral daily  tamsulosin 0.4 milliGRAM(s) Oral two times a day    MEDICATIONS  (PRN):  dextrose 40% Gel 15 Gram(s) Oral once PRN Blood Glucose LESS THAN 70 milliGRAM(s)/deciliter  glucagon  Injectable 1 milliGRAM(s) IntraMuscular once PRN Glucose LESS THAN 70 milligrams/deciliter  guaiFENesin   Syrup  (Sugar-Free) 200 milliGRAM(s) Oral every 6 hours PRN Cough    Allergies    No Known Allergies    Intolerances    Vital Signs Last 24 Hrs  T(C): 36.4 (21 Feb 2020 12:25), Max: 37.2 (20 Feb 2020 19:50)  T(F): 97.6 (21 Feb 2020 12:25), Max: 98.9 (20 Feb 2020 19:50)  HR: 65 (21 Feb 2020 12:25) (65 - 79)  BP: 144/64 (21 Feb 2020 12:25) (128/71 - 160/72)  BP(mean): --  RR: 20 (21 Feb 2020 12:25) (20 - 24)  SpO2: 98% (21 Feb 2020 12:25) (94% - 98%)  I&O's Summary    20 Feb 2020 07:01  -  21 Feb 2020 07:00  --------------------------------------------------------  IN: 250 mL / OUT: 1740 mL / NET: -1490 mL    21 Feb 2020 07:01  -  21 Feb 2020 13:05  --------------------------------------------------------  IN: 780 mL / OUT: 0 mL / NET: 780 mL    PHYSICAL EXAM:  TELE: Apaced  Constitutional: NAD, awake and alert, well-developed  HEENT: Moist Mucous Membranes, Anicteric  Pulmonary: Non-labored, breath sounds are diminished bilaterally, + wheezing, No rales + rhonchi  Cardiovascular: Regular, S1 and S2, No murmurs, rubs, gallops or clicks  Gastrointestinal: Bowel Sounds present, soft, nontender.   Lymph: No peripheral edema. No lymphadenopathy.  Skin: No visible rashes or ulcers.  Psych:  Mood & affect appropriate  LABS: All Labs Reviewed:                        9.1    8.10  )-----------( 111      ( 21 Feb 2020 07:03 )             27.4                         8.4    6.04  )-----------( 110      ( 20 Feb 2020 09:02 )             25.6     21 Feb 2020 07:03    138    |  105    |  54     ----------------------------<  179    4.8     |  24     |  1.60   20 Feb 2020 09:02    138    |  104    |  44     ----------------------------<  155    4.4     |  24     |  1.70     Ca    9.1        21 Feb 2020 07:03  Ca    9.3        20 Feb 2020 09:02  Mg     2.3       21 Feb 2020 07:03    TPro  7.4    /  Alb  3.5    /  TBili  0.4    /  DBili  x      /  AST  46     /  ALT  26     /  AlkPhos  51     21 Feb 2020 07:03  TPro  6.9    /  Alb  3.4    /  TBili  0.6    /  DBili  x      /  AST  19     /  ALT  15     /  AlkPhos  46     20 Feb 2020 09:02    PT/INR - ( 20 Feb 2020 09:02 )   PT: 14.5 sec;   INR: 1.28 ratio    PTT - ( 20 Feb 2020 09:02 )  PTT:30.6 sec    < from: MARLENE w/o TTE (w/3D Echo) (10.12.17 @ 07:55) >    Patient name: ANNAMARIA AVALOS  YOB: 1931   Age: 86 (M)   MR#: [id number]  Study Date: 10/12/2017  Location: O/PSonographer: Gomez Reid M.D.  Study quality: Technically Difficult  Referring Physician: Weston Gray MD  Blood Pressure: 144/74 mmHg  Height: 188 cm  Weight: 91 kg  BSA: 2.2 m2  ------------------------------------------------------------------------  PROCEDURE: Transesophageal (MARLENE) was performed in the  echocardiography laboratory.  Informed consent was first  obtained for MARLENE.  The patient was sedated by Anesthesia.  The procedure was monitored with automatic blood pressure  monitoring, ECG tracings and pulse oximetry.  Gag reflex  was abolished with topical Cetacaine and the  transesophageal probe was placed in the esophagus posterior  to the heart without complications.  The patient tolerated  the procedure well.   Real-time and reconstructed  3-dimensional imaging was performed.  Color Doppler  analysis was carried out using both 2D and 3D mapping.  Patient was injected with 10 cc's of aerosolized saline.  INDICATION: Unspecified atrial fibrillation (I48.91)  ------------------------------------------------------------------------  OBSERVATIONS:  Mitral Valve: Mitral annular calcification, otherwise  normal mitral valve. Mild mitral regurgitation.  Aortic Root: Moderate aortic root dilatation  (Ao: 4.8 cm  at the sinuses of Valsalva).  Mild non-mobile  atherosclerotic plaque in the aortic arch and descending  thoracic aorta.  Aortic Valve: Calcified trileaflet aortic valve with normal  opening. Mild aortic regurgitation.  Left Atrium: Dilated left atrium.  A Watchman closure  device is present and is well seated at the ostium of the  left atrial appendage (NYA).  Colour Doppler interrogation  demonstrates a small amount of residual flow around the  device.  The width of the joel-device jet is about  2-3 mm  by colour Doppler interrogation.  The joel-device flow  occurs on the medial aspect of the device.  Left Ventricle: Endocardium not well visualized; grossly  normal left ventricular systolic function.  Right Heart: Normal right atrium. Normal right ventricular  size and function.  A device wire is noted in the right  heart. Normal tricuspid valve.  Minimal tricuspid  regurgitation.Normal pulmonic valve.  Pericardium/PleuraNormal pericardium with no pericardial  effusion.  ------------------------------------------------------------------------  CONCLUSIONS:  1. Mitral annular calcification, otherwise normal mitral  valve. Mildmitral regurgitation.  2. Calcified trileaflet aortic valve with normal opening.  Mild aortic regurgitation.  3. Moderate aortic root dilatation  (Ao: 4.8 cm at the  sinuses of Valsalva).  Mild non-mobile atherosclerotic  plaque in the aortic arch and descending thoracic aorta.  4. Dilated left atrium.  A Watchman closure device is  present and is well seated at the ostium of the left atrial  appendage (NYA).  Colour Doppler interrogation demonstrates  a small amount of residual flow around the device.  The  width of the joel-device jet is about  2-3 mm by colour  Doppler interrogation.  The joel-device flow occurs on the  medial aspect of the device.  5. Endocardium not well visualized; grossly normal left  ventricular systolic function.  6.Normal right ventricular size and function.  A device  wire is noted in the right heart.  7. Contrast injection demonstrates no evidence of a patent  foramen ovale.  ------------------------------------------------------------------------  Confirmed on  10/12/2017 - 14:41:46 by Mario Alberto Villareal M.D.  ------------------------------------------------------------------------    < end of copied text >    < from: CT Chest No Cont (02.20.20 @ 11:13) >    EXAM:  CT CHEST                            PROCEDURE DATE:  02/20/2020          INTERPRETATION:  CLINICAL INFORMATION: Shortness of breath.    COMPARISON: Chest CT 6/28/2013.    PROCEDURE:   CT of the Chest was performed without intravenous contrast.  Sagittal and coronal reformats were performed.      FINDINGS:    LUNGS AND AIRWAYS: Patent central airways.  Diffuse interstitial prominence throughout both lungs. There are patchy and nodular tree-in-bud type opacities throughout both lungs but predominantly in the right lower lobe, likely infectious in etiology. There is diffuse intralobular septal thickening, likely representing pulmonary vascular congestion.    PLEURA: No pleural effusion.    MEDIASTINUM AND TAMIA: No lymphadenopathy.    VESSELS: The thoracic aorta and main pulmonary artery are normal in caliber. There is heavy coronary artery calcification.    HEART: Heart size is enlarged. There are pacemaker leads in the right atrium and right ventricle. There is aortic valvular calcification. A device is present within the left atrial appendage. No pericardial effusion.    CHEST WALL AND LOWER NECK: The thyroid gland is within normal limits. There is no supraclavicular or axillary lymphadenopathy. There is a pacemaker generator pack in the left anterior chest wall with leads through a left subclavian approach.    VISUALIZED UPPER ABDOMEN: The adrenal glands are within normal limits. The imaged portions of the unenhanced liver, spleen, are within normal limits. The pancreas is atrophic. What likely represents cholecystectomy clips are noted in the gallbladder fossa. Small hiatal hernia.    BONES: Multilevel degenerative change of the thoracolumbar spine with osteophytes, degenerative disc disease and facet joint arthropathy. There are median sternotomy wires.    IMPRESSION:     Pulmonary vascular congestion.  Patchy and nodular opacities in both lungs, particularly in the right lower lobe, likely reflecting pneumonia.    SHYE PORTILLO M.D. ATTENDING RADIOLOGIST  This document has been electronically signed. Feb 20 2020 11:31AM     < end of copied text >    < from: 12 Lead ECG (02.20.20 @ 08:24) >    Ventricular Rate 83 BPM    Atrial Rate 85 BPM    P-R Interval 196 ms    QRS Duration 200 ms    Q-T Interval 476 ms    QTC Calculation(Bezet) 559 ms    R Axis -50 degrees    T Axis 120 degrees    Diagnosis Line AV dual-paced rhythm with frequent premature ventricular complexes  Abnormal ECG  When compared with ECG of 28-JUN-2013 21:25,  premature ventricular complexes are now present  Vent. rate has increased BY  23 BPM  Confirmed by BERT ZAVALA ([id number]) on 2/20/2020 3:06:14 PM    < end of copied text >

## 2020-02-21 NOTE — PROGRESS NOTE ADULT - SUBJECTIVE AND OBJECTIVE BOX
ANNAMARIA AVALOS  88y  Male    Patient is a 88y old  Male who presents with a chief complaint of shortness of breath (2020 07:34)      out of bed to chair  feels much better, less cough.      PAST MEDICAL & SURGICAL HISTORY:  Coronary artery disease involving native coronary artery of native heart, angina presence unspecified  Persistent atrial fibrillation: wwas on eliquis, stopped due to gi bleed workup underway  Type 2 diabetes mellitus without complication, without long-term current use of insulin  Hyperlipemia  MI, old  Hypertension  Sepsis: sec to prostate bx  S/P coronary artery bypass graft x 3  Pacemaker: checked at pmd/cardio office 1 week ago  Presence of Watchman left atrial appendage closure device  H/O prostate biopsy  History of cholecystectomy  Artificial cardiac pacemaker  Stented coronary artery  H/O coronary angioplasty: 4 stents  S/P CABG x 3          PHYSICAL EXAM:    T(C): 36.3 (20 @ 07:10), Max: 37.3 (20 @ 13:05)  HR: 70 (20 @ 07:10) (70 - 79)  BP: 144/74 (20 @ 07:10) (128/71 - 160/72)  RR: 24 (20 @ 07:10) (21 - 25)  SpO2: 94% (20 @ 07:10) (92% - 97%)  Wt(kg): --    I&O's Detail    2020 07:  -  2020 07:00  --------------------------------------------------------  IN:    Oral Fluid: 250 mL  Total IN: 250 mL    OUT:    Voided: 1740 mL  Total OUT: 1740 mL    Total NET: -1490 mL      2020 07:  -  2020 12:13  --------------------------------------------------------  IN:    Oral Fluid: 480 mL    Solution: 250 mL    Solution: 50 mL  Total IN: 780 mL    OUT:  Total OUT: 0 mL    Total NET: 780 mL          Respiratory: clear anteriorly, decreased BS at bases  Cardiovascular: S1 S2  Gastrointestinal: soft NT ND +BS  Extremities: trace edema   Neuro: Awake and alert    MEDICATIONS  (STANDING):  albuterol/ipratropium for Nebulization. 3 milliLiter(s) Nebulizer every 6 hours  ascorbic acid 500 milliGRAM(s) Oral daily  aspirin  chewable 81 milliGRAM(s) Oral daily  azithromycin  IVPB 500 milliGRAM(s) IV Intermittent every 24 hours  carvedilol 25 milliGRAM(s) Oral every 12 hours  cefTRIAXone   IVPB 1000 milliGRAM(s) IV Intermittent every 24 hours  dextrose 5%. 1000 milliLiter(s) (50 mL/Hr) IV Continuous <Continuous>  dextrose 50% Injectable 12.5 Gram(s) IV Push once  dextrose 50% Injectable 25 Gram(s) IV Push once  dextrose 50% Injectable 25 Gram(s) IV Push once  ferrous    sulfate 325 milliGRAM(s) Oral daily  guaiFENesin  milliGRAM(s) Oral every 12 hours  insulin lispro (HumaLOG) corrective regimen sliding scale   SubCutaneous three times a day before meals  insulin lispro (HumaLOG) corrective regimen sliding scale   SubCutaneous at bedtime  montelukast 10 milliGRAM(s) Oral daily  multivitamin/minerals 1 Tablet(s) Oral daily  pantoprazole    Tablet 40 milliGRAM(s) Oral two times a day  predniSONE   Tablet 30 milliGRAM(s) Oral daily  sertraline 50 milliGRAM(s) Oral daily  tamsulosin 0.4 milliGRAM(s) Oral two times a day    MEDICATIONS  (PRN):  dextrose 40% Gel 15 Gram(s) Oral once PRN Blood Glucose LESS THAN 70 milliGRAM(s)/deciliter  glucagon  Injectable 1 milliGRAM(s) IntraMuscular once PRN Glucose LESS THAN 70 milligrams/deciliter  guaiFENesin   Syrup  (Sugar-Free) 200 milliGRAM(s) Oral every 6 hours PRN Cough                            9.1    8.10  )-----------( 111      ( 2020 07:03 )             27.4       02-21    138  |  105  |  54<H>  ----------------------------<  179<H>  4.8   |  24  |  1.60<H>    Ca    9.1      2020 07:03  Mg     2.3         TPro  7.4  /  Alb  3.5  /  TBili  0.4  /  DBili  x   /  AST  46<H>  /  ALT  26  /  AlkPhos  51        Creatinine Trend: Creatinine Trend: 1.60<--, 1.70<--    Urinalysis Basic - ( 2020 19:24 )    Color: Yellow / Appearance: Clear / S.010 / pH: x  Gluc: x / Ketone: Negative  / Bili: Negative / Urobili: Negative   Blood: x / Protein: 15 / Nitrite: Negative   Leuk Esterase: Negative / RBC: 0-2 /HPF / WBC 0-2   Sq Epi: x / Non Sq Epi: Occasional / Bacteria: Occasional      < from: US Renal (20 @ 20:25) >  EXAM:  US KIDNEY(S)                            PROCEDURE DATE:  2020          INTERPRETATION:  CLINICAL INFORMATION: Acute kidney injury, renal failure    COMPARISON: 3/23/2013    TECHNIQUE: Sonography of the kidneys and bladder.     FINDINGS:    Right kidney:  11.2 cm. Increased echogenicity. No renal mass, hydronephrosis or calculi.    Left kidney:  11.2 cm. Increased echogenicity. No renal mass, hydronephrosis or calculi.    Urinary bladder: Within normal limits.    IMPRESSION:     Bilaterally increased renal cortical echogenicity compatible with medical renal disease.  No hydronephrosis.    < end of copied text >

## 2020-02-21 NOTE — PROGRESS NOTE ADULT - SUBJECTIVE AND OBJECTIVE BOX
Date/Time Patient Seen:  		  Referring MD:   Data Reviewed	       Patient is a 88y old  Male who presents with a chief complaint of shortness of breath (20 Feb 2020 17:07)      Subjective/HPI     PAST MEDICAL & SURGICAL HISTORY:  Coronary artery disease involving native coronary artery of native heart, angina presence unspecified  Persistent atrial fibrillation: wwas on eliquis, stopped due to gi bleed workup underway  Type 2 diabetes mellitus without complication, without long-term current use of insulin  Hyperlipemia  MI, old  Hypertension  Sepsis: sec to prostate bx  S/P coronary artery bypass graft x 3  Pacemaker: checked at pmd/cardio office 1 week ago  Presence of Watchman left atrial appendage closure device  H/O prostate biopsy  History of cholecystectomy  Artificial cardiac pacemaker  Stented coronary artery  H/O coronary angioplasty: 4 stents  S/P CABG x 3  No significant past surgical history        Medication list         MEDICATIONS  (STANDING):  ascorbic acid 500 milliGRAM(s) Oral daily  aspirin  chewable 81 milliGRAM(s) Oral daily  azithromycin  IVPB 500 milliGRAM(s) IV Intermittent every 24 hours  carvedilol 25 milliGRAM(s) Oral every 12 hours  cefTRIAXone   IVPB 1000 milliGRAM(s) IV Intermittent every 24 hours  dextrose 5%. 1000 milliLiter(s) (50 mL/Hr) IV Continuous <Continuous>  dextrose 50% Injectable 12.5 Gram(s) IV Push once  dextrose 50% Injectable 25 Gram(s) IV Push once  dextrose 50% Injectable 25 Gram(s) IV Push once  ferrous    sulfate 325 milliGRAM(s) Oral daily  guaiFENesin  milliGRAM(s) Oral every 12 hours  insulin lispro (HumaLOG) corrective regimen sliding scale   SubCutaneous three times a day before meals  insulin lispro (HumaLOG) corrective regimen sliding scale   SubCutaneous at bedtime  montelukast 10 milliGRAM(s) Oral daily  multivitamin/minerals 1 Tablet(s) Oral daily  pantoprazole    Tablet 40 milliGRAM(s) Oral two times a day  predniSONE   Tablet 30 milliGRAM(s) Oral daily  sertraline 50 milliGRAM(s) Oral daily  tamsulosin 0.4 milliGRAM(s) Oral two times a day    MEDICATIONS  (PRN):  ALBUTerol    0.083% 2.5 milliGRAM(s) Nebulizer every 4 hours PRN Shortness of Breath and/or Wheezing  dextrose 40% Gel 15 Gram(s) Oral once PRN Blood Glucose LESS THAN 70 milliGRAM(s)/deciliter  glucagon  Injectable 1 milliGRAM(s) IntraMuscular once PRN Glucose LESS THAN 70 milligrams/deciliter  guaiFENesin   Syrup  (Sugar-Free) 200 milliGRAM(s) Oral every 6 hours PRN Cough         Vitals log        ICU Vital Signs Last 24 Hrs  T(C): 36.4 (21 Feb 2020 04:14), Max: 37.3 (20 Feb 2020 13:05)  T(F): 97.5 (21 Feb 2020 04:14), Max: 99.2 (20 Feb 2020 13:05)  HR: 79 (21 Feb 2020 04:14) (70 - 79)  BP: 154/61 (21 Feb 2020 04:14) (128/71 - 160/72)  BP(mean): --  ABP: --  ABP(mean): --  RR: 22 (21 Feb 2020 04:14) (21 - 25)  SpO2: 96% (21 Feb 2020 04:14) (92% - 97%)           Input and Output:  I&O's Detail    20 Feb 2020 07:01  -  21 Feb 2020 07:00  --------------------------------------------------------  IN:    Oral Fluid: 250 mL  Total IN: 250 mL    OUT:    Voided: 1740 mL  Total OUT: 1740 mL    Total NET: -1490 mL          Lab Data                        9.1    8.10  )-----------( 111      ( 21 Feb 2020 07:03 )             27.4     02-20    138  |  104  |  44<H>  ----------------------------<  155<H>  4.4   |  24  |  1.70<H>    Ca    9.3      20 Feb 2020 09:02    TPro  6.9  /  Alb  3.4  /  TBili  0.6  /  DBili  x   /  AST  19  /  ALT  15  /  AlkPhos  46  02-20            Review of Systems	      Objective     Physical Examination    heart s1s2  lung dec BS  abd soft  head nc  head at  cough reported      Pertinent Lab findings & Imaging      Sarah:  NO   Adequate UO     I&O's Detail    20 Feb 2020 07:01  -  21 Feb 2020 07:00  --------------------------------------------------------  IN:    Oral Fluid: 250 mL  Total IN: 250 mL    OUT:    Voided: 1740 mL  Total OUT: 1740 mL    Total NET: -1490 mL               Discussed with:     Cultures:	        Radiology

## 2020-02-21 NOTE — PHYSICAL THERAPY INITIAL EVALUATION ADULT - ADDITIONAL COMMENTS
Yes Patient lives in a one level home with 3 steps to enter, B rails; 2 steps to den, B rails. Patient has a 24/7 aide for his wife. HHA assist patient as needed.

## 2020-02-22 LAB
ANION GAP SERPL CALC-SCNC: 10 MMOL/L — SIGNIFICANT CHANGE UP (ref 5–17)
BUN SERPL-MCNC: 56 MG/DL — HIGH (ref 7–23)
CALCIUM SERPL-MCNC: 9.2 MG/DL — SIGNIFICANT CHANGE UP (ref 8.5–10.1)
CHLORIDE SERPL-SCNC: 102 MMOL/L — SIGNIFICANT CHANGE UP (ref 96–108)
CO2 SERPL-SCNC: 25 MMOL/L — SIGNIFICANT CHANGE UP (ref 22–31)
CREAT SERPL-MCNC: 1.3 MG/DL — SIGNIFICANT CHANGE UP (ref 0.5–1.3)
GLUCOSE SERPL-MCNC: 144 MG/DL — HIGH (ref 70–99)
HCT VFR BLD CALC: 28.9 % — LOW (ref 39–50)
HGB BLD-MCNC: 9.4 G/DL — LOW (ref 13–17)
MCHC RBC-ENTMCNC: 30 PG — SIGNIFICANT CHANGE UP (ref 27–34)
MCHC RBC-ENTMCNC: 32.5 GM/DL — SIGNIFICANT CHANGE UP (ref 32–36)
MCV RBC AUTO: 92.3 FL — SIGNIFICANT CHANGE UP (ref 80–100)
NRBC # BLD: 0 /100 WBCS — SIGNIFICANT CHANGE UP (ref 0–0)
PLATELET # BLD AUTO: 133 K/UL — LOW (ref 150–400)
POTASSIUM SERPL-MCNC: 4 MMOL/L — SIGNIFICANT CHANGE UP (ref 3.5–5.3)
POTASSIUM SERPL-SCNC: 4 MMOL/L — SIGNIFICANT CHANGE UP (ref 3.5–5.3)
RBC # BLD: 3.13 M/UL — LOW (ref 4.2–5.8)
RBC # FLD: 14.6 % — HIGH (ref 10.3–14.5)
SODIUM SERPL-SCNC: 137 MMOL/L — SIGNIFICANT CHANGE UP (ref 135–145)
WBC # BLD: 10.26 K/UL — SIGNIFICANT CHANGE UP (ref 3.8–10.5)
WBC # FLD AUTO: 10.26 K/UL — SIGNIFICANT CHANGE UP (ref 3.8–10.5)

## 2020-02-22 PROCEDURE — 99233 SBSQ HOSP IP/OBS HIGH 50: CPT

## 2020-02-22 PROCEDURE — 99232 SBSQ HOSP IP/OBS MODERATE 35: CPT

## 2020-02-22 RX ORDER — FUROSEMIDE 40 MG
40 TABLET ORAL ONCE
Refills: 0 | Status: COMPLETED | OUTPATIENT
Start: 2020-02-22 | End: 2020-02-22

## 2020-02-22 RX ORDER — IPRATROPIUM/ALBUTEROL SULFATE 18-103MCG
3 AEROSOL WITH ADAPTER (GRAM) INHALATION ONCE
Refills: 0 | Status: COMPLETED | OUTPATIENT
Start: 2020-02-22 | End: 2020-02-22

## 2020-02-22 RX ORDER — SODIUM CHLORIDE 9 MG/ML
3 INJECTION INTRAMUSCULAR; INTRAVENOUS; SUBCUTANEOUS EVERY 6 HOURS
Refills: 0 | Status: DISCONTINUED | OUTPATIENT
Start: 2020-02-22 | End: 2020-02-26

## 2020-02-22 RX ORDER — FUROSEMIDE 40 MG
40 TABLET ORAL EVERY 12 HOURS
Refills: 0 | Status: DISCONTINUED | OUTPATIENT
Start: 2020-02-22 | End: 2020-02-26

## 2020-02-22 RX ADMIN — Medication 81 MILLIGRAM(S): at 11:29

## 2020-02-22 RX ADMIN — SODIUM CHLORIDE 3 MILLILITER(S): 9 INJECTION INTRAMUSCULAR; INTRAVENOUS; SUBCUTANEOUS at 14:19

## 2020-02-22 RX ADMIN — PANTOPRAZOLE SODIUM 40 MILLIGRAM(S): 20 TABLET, DELAYED RELEASE ORAL at 05:09

## 2020-02-22 RX ADMIN — Medication 1 TABLET(S): at 11:29

## 2020-02-22 RX ADMIN — CARVEDILOL PHOSPHATE 25 MILLIGRAM(S): 80 CAPSULE, EXTENDED RELEASE ORAL at 05:09

## 2020-02-22 RX ADMIN — Medication 3 MILLILITER(S): at 06:23

## 2020-02-22 RX ADMIN — Medication 3 MILLILITER(S): at 01:00

## 2020-02-22 RX ADMIN — Medication 325 MILLIGRAM(S): at 11:29

## 2020-02-22 RX ADMIN — Medication 40 MILLIGRAM(S): at 17:12

## 2020-02-22 RX ADMIN — TAMSULOSIN HYDROCHLORIDE 0.4 MILLIGRAM(S): 0.4 CAPSULE ORAL at 05:09

## 2020-02-22 RX ADMIN — SODIUM CHLORIDE 3 MILLILITER(S): 9 INJECTION INTRAMUSCULAR; INTRAVENOUS; SUBCUTANEOUS at 20:20

## 2020-02-22 RX ADMIN — Medication 1: at 08:18

## 2020-02-22 RX ADMIN — Medication 200 MILLIGRAM(S): at 11:29

## 2020-02-22 RX ADMIN — Medication 200 MILLIGRAM(S): at 05:09

## 2020-02-22 RX ADMIN — Medication 40 MILLIGRAM(S): at 09:24

## 2020-02-22 RX ADMIN — Medication 30 MILLIGRAM(S): at 05:09

## 2020-02-22 RX ADMIN — Medication 40 MILLIGRAM(S): at 11:14

## 2020-02-22 RX ADMIN — Medication 3 MILLILITER(S): at 07:21

## 2020-02-22 RX ADMIN — AZITHROMYCIN 255 MILLIGRAM(S): 500 TABLET, FILM COATED ORAL at 09:24

## 2020-02-22 RX ADMIN — CEFTRIAXONE 100 MILLIGRAM(S): 500 INJECTION, POWDER, FOR SOLUTION INTRAMUSCULAR; INTRAVENOUS at 11:14

## 2020-02-22 RX ADMIN — MONTELUKAST 10 MILLIGRAM(S): 4 TABLET, CHEWABLE ORAL at 11:29

## 2020-02-22 RX ADMIN — Medication 500 MILLIGRAM(S): at 11:29

## 2020-02-22 RX ADMIN — Medication 3 MILLILITER(S): at 05:10

## 2020-02-22 RX ADMIN — Medication 2: at 13:14

## 2020-02-22 RX ADMIN — TAMSULOSIN HYDROCHLORIDE 0.4 MILLIGRAM(S): 0.4 CAPSULE ORAL at 17:12

## 2020-02-22 RX ADMIN — Medication 600 MILLIGRAM(S): at 17:12

## 2020-02-22 RX ADMIN — Medication 40 MILLIGRAM(S): at 06:21

## 2020-02-22 RX ADMIN — PANTOPRAZOLE SODIUM 40 MILLIGRAM(S): 20 TABLET, DELAYED RELEASE ORAL at 17:13

## 2020-02-22 RX ADMIN — Medication 4: at 17:12

## 2020-02-22 RX ADMIN — Medication 3 MILLILITER(S): at 14:19

## 2020-02-22 RX ADMIN — CARVEDILOL PHOSPHATE 25 MILLIGRAM(S): 80 CAPSULE, EXTENDED RELEASE ORAL at 17:12

## 2020-02-22 RX ADMIN — SERTRALINE 50 MILLIGRAM(S): 25 TABLET, FILM COATED ORAL at 11:29

## 2020-02-22 RX ADMIN — Medication 3 MILLILITER(S): at 20:20

## 2020-02-22 RX ADMIN — Medication 600 MILLIGRAM(S): at 05:09

## 2020-02-22 NOTE — PROGRESS NOTE ADULT - SUBJECTIVE AND OBJECTIVE BOX
Date/Time Patient Seen:  		  Referring MD:   Data Reviewed	       Patient is a 88y old  Male who presents with a chief complaint of shortness of breath (22 Feb 2020 10:43)      Subjective/HPI     PAST MEDICAL & SURGICAL HISTORY:  Coronary artery disease involving native coronary artery of native heart, angina presence unspecified  Persistent atrial fibrillation: wwas on eliquis, stopped due to gi bleed workup underway  Type 2 diabetes mellitus without complication, without long-term current use of insulin  Hyperlipemia  MI, old  Hypertension  Sepsis: sec to prostate bx  S/P coronary artery bypass graft x 3  Pacemaker: checked at pmd/cardio office 1 week ago  Presence of Watchman left atrial appendage closure device  H/O prostate biopsy  History of cholecystectomy  Artificial cardiac pacemaker  Stented coronary artery  H/O coronary angioplasty: 4 stents  S/P CABG x 3  No significant past surgical history        Medication list         MEDICATIONS  (STANDING):  albuterol/ipratropium for Nebulization. 3 milliLiter(s) Nebulizer every 6 hours  ascorbic acid 500 milliGRAM(s) Oral daily  aspirin  chewable 81 milliGRAM(s) Oral daily  carvedilol 25 milliGRAM(s) Oral every 12 hours  cefTRIAXone   IVPB 1000 milliGRAM(s) IV Intermittent every 24 hours  dextrose 5%. 1000 milliLiter(s) (50 mL/Hr) IV Continuous <Continuous>  dextrose 50% Injectable 12.5 Gram(s) IV Push once  dextrose 50% Injectable 25 Gram(s) IV Push once  dextrose 50% Injectable 25 Gram(s) IV Push once  ferrous    sulfate 325 milliGRAM(s) Oral daily  furosemide   Injectable 40 milliGRAM(s) IV Push every 12 hours  guaiFENesin  milliGRAM(s) Oral every 12 hours  insulin lispro (HumaLOG) corrective regimen sliding scale   SubCutaneous three times a day before meals  insulin lispro (HumaLOG) corrective regimen sliding scale   SubCutaneous at bedtime  montelukast 10 milliGRAM(s) Oral daily  multivitamin/minerals 1 Tablet(s) Oral daily  pantoprazole    Tablet 40 milliGRAM(s) Oral two times a day  predniSONE   Tablet 30 milliGRAM(s) Oral daily  sertraline 50 milliGRAM(s) Oral daily  sodium chloride 3%  Inhalation 3 milliLiter(s) Inhalation every 6 hours  tamsulosin 0.4 milliGRAM(s) Oral two times a day    MEDICATIONS  (PRN):  dextrose 40% Gel 15 Gram(s) Oral once PRN Blood Glucose LESS THAN 70 milliGRAM(s)/deciliter  glucagon  Injectable 1 milliGRAM(s) IntraMuscular once PRN Glucose LESS THAN 70 milligrams/deciliter  guaiFENesin   Syrup  (Sugar-Free) 200 milliGRAM(s) Oral every 6 hours PRN Cough         Vitals log        ICU Vital Signs Last 24 Hrs  T(C): 37 (22 Feb 2020 07:20), Max: 37 (22 Feb 2020 07:20)  T(F): 98.6 (22 Feb 2020 07:20), Max: 98.6 (22 Feb 2020 07:20)  HR: 75 (22 Feb 2020 07:21) (58 - 86)  BP: 128/66 (22 Feb 2020 07:20) (128/66 - 161/90)  BP(mean): --  ABP: --  ABP(mean): --  RR: 18 (22 Feb 2020 07:20) (18 - 20)  SpO2: 97% (22 Feb 2020 07:21) (94% - 100%)           Input and Output:  I&O's Detail    21 Feb 2020 07:01  -  22 Feb 2020 07:00  --------------------------------------------------------  IN:    Oral Fluid: 1080 mL    Solution: 250 mL    Solution: 50 mL  Total IN: 1380 mL    OUT:    Voided: 3500 mL  Total OUT: 3500 mL    Total NET: -2120 mL      22 Feb 2020 07:01  -  22 Feb 2020 12:21  --------------------------------------------------------  IN:  Total IN: 0 mL    OUT:    Voided: 450 mL  Total OUT: 450 mL    Total NET: -450 mL          Lab Data                        9.4    10.26 )-----------( 133      ( 22 Feb 2020 06:16 )             28.9     02-22    137  |  102  |  56<H>  ----------------------------<  144<H>  4.0   |  25  |  1.30    Ca    9.2      22 Feb 2020 06:16  Mg     2.3     02-21    TPro  7.4  /  Alb  3.5  /  TBili  0.4  /  DBili  x   /  AST  46<H>  /  ALT  26  /  AlkPhos  51  02-21            Review of Systems	      Objective     Physical Examination    heart s1s2  lung dec BS  abd sft  head at  head nc      Pertinent Lab findings & Imaging      Sarah:  NO   Adequate UO     I&O's Detail    21 Feb 2020 07:01  -  22 Feb 2020 07:00  --------------------------------------------------------  IN:    Oral Fluid: 1080 mL    Solution: 250 mL    Solution: 50 mL  Total IN: 1380 mL    OUT:    Voided: 3500 mL  Total OUT: 3500 mL    Total NET: -2120 mL      22 Feb 2020 07:01  -  22 Feb 2020 12:21  --------------------------------------------------------  IN:  Total IN: 0 mL    OUT:    Voided: 450 mL  Total OUT: 450 mL    Total NET: -450 mL               Discussed with:     Cultures:	        Radiology

## 2020-02-22 NOTE — PROGRESS NOTE ADULT - SUBJECTIVE AND OBJECTIVE BOX
ANNAMARIA AVALOS  88y  Male    Patient is a 88y old  Male who presents with a chief complaint of shortness of breath (2020 10:33)      complains of cough and SOB    PAST MEDICAL & SURGICAL HISTORY:  Coronary artery disease involving native coronary artery of native heart, angina presence unspecified  Persistent atrial fibrillation: wwas on eliquis, stopped due to gi bleed workup underway  Type 2 diabetes mellitus without complication, without long-term current use of insulin  Hyperlipemia  MI, old  Hypertension  Sepsis: sec to prostate bx  S/P coronary artery bypass graft x 3  Pacemaker: checked at pmd/cardio office 1 week ago  Presence of Watchman left atrial appendage closure device  H/O prostate biopsy  History of cholecystectomy  Artificial cardiac pacemaker  Stented coronary artery  H/O coronary angioplasty: 4 stents  S/P CABG x 3          PHYSICAL EXAM:    T(C): 37 (20 @ 07:20), Max: 37 (20 @ 07:20)  HR: 75 (20 @ 07:21) (58 - 86)  BP: 128/66 (20 @ 07:20) (128/66 - 161/90)  RR: 18 (20 @ 07:20) (18 - 20)  SpO2: 97% (20 @ 07:21) (94% - 100%)  Wt(kg): --    I&O's Detail    2020 07:  -  2020 07:00  --------------------------------------------------------  IN:    Oral Fluid: 1080 mL    Solution: 250 mL    Solution: 50 mL  Total IN: 1380 mL    OUT:    Voided: 3500 mL  Total OUT: 3500 mL    Total NET: -2120 mL      2020 07:  -  2020 10:44  --------------------------------------------------------  IN:  Total IN: 0 mL    OUT:    Voided: 450 mL  Total OUT: 450 mL    Total NET: -450 mL          Respiratory: bilateral wheeze  Cardiovascular: S1 S2  Gastrointestinal: soft NT ND +BS  Extremities: trace edema   Neuro: Awake and alert    MEDICATIONS  (STANDING):  albuterol/ipratropium for Nebulization. 3 milliLiter(s) Nebulizer every 6 hours  ascorbic acid 500 milliGRAM(s) Oral daily  aspirin  chewable 81 milliGRAM(s) Oral daily  carvedilol 25 milliGRAM(s) Oral every 12 hours  cefTRIAXone   IVPB 1000 milliGRAM(s) IV Intermittent every 24 hours  dextrose 5%. 1000 milliLiter(s) (50 mL/Hr) IV Continuous <Continuous>  dextrose 50% Injectable 12.5 Gram(s) IV Push once  dextrose 50% Injectable 25 Gram(s) IV Push once  dextrose 50% Injectable 25 Gram(s) IV Push once  ferrous    sulfate 325 milliGRAM(s) Oral daily  furosemide   Injectable 40 milliGRAM(s) IV Push every 12 hours  furosemide   Injectable 40 milliGRAM(s) IV Push once  guaiFENesin  milliGRAM(s) Oral every 12 hours  insulin lispro (HumaLOG) corrective regimen sliding scale   SubCutaneous three times a day before meals  insulin lispro (HumaLOG) corrective regimen sliding scale   SubCutaneous at bedtime  montelukast 10 milliGRAM(s) Oral daily  multivitamin/minerals 1 Tablet(s) Oral daily  pantoprazole    Tablet 40 milliGRAM(s) Oral two times a day  predniSONE   Tablet 30 milliGRAM(s) Oral daily  sertraline 50 milliGRAM(s) Oral daily  sodium chloride 3%  Inhalation 3 milliLiter(s) Inhalation every 6 hours  tamsulosin 0.4 milliGRAM(s) Oral two times a day    MEDICATIONS  (PRN):  dextrose 40% Gel 15 Gram(s) Oral once PRN Blood Glucose LESS THAN 70 milliGRAM(s)/deciliter  glucagon  Injectable 1 milliGRAM(s) IntraMuscular once PRN Glucose LESS THAN 70 milligrams/deciliter  guaiFENesin   Syrup  (Sugar-Free) 200 milliGRAM(s) Oral every 6 hours PRN Cough                            9.4    10.26 )-----------( 133      ( 2020 06:16 )             28.9       02-22    137  |  102  |  56<H>  ----------------------------<  144<H>  4.0   |  25  |  1.30    Ca    9.2      2020 06:16  Mg     2.3         TPro  7.4  /  Alb  3.5  /  TBili  0.4  /  DBili  x   /  AST  46<H>  /  ALT  26  /  AlkPhos  51  -      Creatinine Trend: Creatinine Trend: 1.30<--, 1.60<--, 1.70<--    Urinalysis Basic - ( 2020 19:24 )    Color: Yellow / Appearance: Clear / S.010 / pH: x  Gluc: x / Ketone: Negative  / Bili: Negative / Urobili: Negative   Blood: x / Protein: 15 / Nitrite: Negative   Leuk Esterase: Negative / RBC: 0-2 /HPF / WBC 0-2   Sq Epi: x / Non Sq Epi: Occasional / Bacteria: Occasional

## 2020-02-22 NOTE — PROGRESS NOTE ADULT - SUBJECTIVE AND OBJECTIVE BOX
[facility] -- Raman Ceja Grossman, [place], David Anton Savella, [place]  Office # [phone]    Follow Up:      Subjective/Observations:     REVIEW OF SYSTEMS: All other review of systems is negative unless indicated above  PAST MEDICAL & SURGICAL HISTORY:  Coronary artery disease involving native coronary artery of native heart, angina presence unspecified  Persistent atrial fibrillation: wwas on eliquis, stopped due to gi bleed workup underway  Type 2 diabetes mellitus without complication, without long-term current use of insulin  Hyperlipemia  MI, old  Hypertension  Sepsis: sec to prostate bx  S/P coronary artery bypass graft x 3  Pacemaker: checked at pmd/cardio office 1 week ago  Presence of Watchman left atrial appendage closure device  H/O prostate biopsy  History of cholecystectomy  Artificial cardiac pacemaker  Stented coronary artery  H/O coronary angioplasty: 4 stents  S/P CABG x 3    MEDICATIONS  (STANDING):  albuterol/ipratropium for Nebulization. 3 milliLiter(s) Nebulizer every 6 hours  ascorbic acid 500 milliGRAM(s) Oral daily  aspirin  chewable 81 milliGRAM(s) Oral daily  carvedilol 25 milliGRAM(s) Oral every 12 hours  cefTRIAXone   IVPB 1000 milliGRAM(s) IV Intermittent every 24 hours  dextrose 5%. 1000 milliLiter(s) (50 mL/Hr) IV Continuous <Continuous>  dextrose 50% Injectable 12.5 Gram(s) IV Push once  dextrose 50% Injectable 25 Gram(s) IV Push once  dextrose 50% Injectable 25 Gram(s) IV Push once  ferrous    sulfate 325 milliGRAM(s) Oral daily  furosemide   Injectable 40 milliGRAM(s) IV Push every 12 hours  furosemide   Injectable 40 milliGRAM(s) IV Push once  guaiFENesin  milliGRAM(s) Oral every 12 hours  insulin lispro (HumaLOG) corrective regimen sliding scale   SubCutaneous three times a day before meals  insulin lispro (HumaLOG) corrective regimen sliding scale   SubCutaneous at bedtime  montelukast 10 milliGRAM(s) Oral daily  multivitamin/minerals 1 Tablet(s) Oral daily  pantoprazole    Tablet 40 milliGRAM(s) Oral two times a day  predniSONE   Tablet 30 milliGRAM(s) Oral daily  sertraline 50 milliGRAM(s) Oral daily  sodium chloride 3%  Inhalation 3 milliLiter(s) Inhalation every 6 hours  tamsulosin 0.4 milliGRAM(s) Oral two times a day    MEDICATIONS  (PRN):  dextrose 40% Gel 15 Gram(s) Oral once PRN Blood Glucose LESS THAN 70 milliGRAM(s)/deciliter  glucagon  Injectable 1 milliGRAM(s) IntraMuscular once PRN Glucose LESS THAN 70 milligrams/deciliter  guaiFENesin   Syrup  (Sugar-Free) 200 milliGRAM(s) Oral every 6 hours PRN Cough    Allergies    No Known Allergies    Intolerances      Vital Signs Last 24 Hrs  T(C): 37 (22 Feb 2020 07:20), Max: 37 (22 Feb 2020 07:20)  T(F): 98.6 (22 Feb 2020 07:20), Max: 98.6 (22 Feb 2020 07:20)  HR: 75 (22 Feb 2020 07:21) (58 - 86)  BP: 128/66 (22 Feb 2020 07:20) (128/66 - 161/90)  BP(mean): --  RR: 18 (22 Feb 2020 07:20) (18 - 20)  SpO2: 97% (22 Feb 2020 07:21) (94% - 100%)  I&O's Summary    21 Feb 2020 07:01  -  22 Feb 2020 07:00  --------------------------------------------------------  IN: 1380 mL / OUT: 3500 mL / NET: -2120 mL    22 Feb 2020 07:01  -  22 Feb 2020 10:33  --------------------------------------------------------  IN: 0 mL / OUT: 450 mL / NET: -450 mL     PHYSICAL EXAM:  TELE:   Constitutional: NAD, awake and alert, well-developed  HEENT: Moist Mucous Membranes, Anicteric  Pulmonary: Non-labored, breath sounds are clear bilaterally, No wheezing, rales or rhonchi  Cardiovascular: Regular, S1 and S2, No murmurs, rubs, gallops or clicks  Gastrointestinal: Bowel Sounds present, soft, nontender.   Lymph: No peripheral edema. No lymphadenopathy.  Skin: No visible rashes or ulcers.  Psych:  Mood & affect appropriate  LABS: All Labs Reviewed:                        9.4    10.26 )-----------( 133      ( 22 Feb 2020 06:16 )             28.9                         9.1    8.10  )-----------( 111      ( 21 Feb 2020 07:03 )             27.4                         8.4    6.04  )-----------( 110      ( 20 Feb 2020 09:02 )             25.6     22 Feb 2020 06:16    137    |  102    |  56     ----------------------------<  144    4.0     |  25     |  1.30   21 Feb 2020 07:03    138    |  105    |  54     ----------------------------<  179    4.8     |  24     |  1.60   20 Feb 2020 09:02    138    |  104    |  44     ----------------------------<  155    4.4     |  24     |  1.70     Ca    9.2        22 Feb 2020 06:16  Ca    9.1        21 Feb 2020 07:03  Ca    9.3        20 Feb 2020 09:02  Mg     2.3       21 Feb 2020 07:03    TPro  7.4    /  Alb  3.5    /  TBili  0.4    /  DBili  x      /  AST  46     /  ALT  26     /  AlkPhos  51     21 Feb 2020 07:03  TPro  6.9    /  Alb  3.4    /  TBili  0.6    /  DBili  x      /  AST  19     /  ALT  15     /  AlkPhos  46     20 Feb 2020 09:02 [facility] -- Raman Ceja, Aryan Brothers, David Anton Savella, [place]  Office # [phone]    Follow Up:  SOB    Subjective/Observations: Seen this am, with significant wheezing.  Duonebs in progress.  s/p Lasix 40 mg this am.  Denies chest discomfort.      REVIEW OF SYSTEMS: All other review of systems is negative unless indicated above  PAST MEDICAL & SURGICAL HISTORY:  Coronary artery disease involving native coronary artery of native heart, angina presence unspecified  Persistent atrial fibrillation: wwas on eliquis, stopped due to gi bleed workup underway  Type 2 diabetes mellitus without complication, without long-term current use of insulin  Hyperlipemia  MI, old  Hypertension  Sepsis: sec to prostate bx  S/P coronary artery bypass graft x 3  Pacemaker: checked at pmd/cardio office 1 week ago  Presence of Watchman left atrial appendage closure device  H/O prostate biopsy  History of cholecystectomy  Artificial cardiac pacemaker  Stented coronary artery  H/O coronary angioplasty: 4 stents  S/P CABG x 3    MEDICATIONS  (STANDING):  albuterol/ipratropium for Nebulization. 3 milliLiter(s) Nebulizer every 6 hours  ascorbic acid 500 milliGRAM(s) Oral daily  aspirin  chewable 81 milliGRAM(s) Oral daily  carvedilol 25 milliGRAM(s) Oral every 12 hours  cefTRIAXone   IVPB 1000 milliGRAM(s) IV Intermittent every 24 hours  dextrose 5%. 1000 milliLiter(s) (50 mL/Hr) IV Continuous <Continuous>  dextrose 50% Injectable 12.5 Gram(s) IV Push once  dextrose 50% Injectable 25 Gram(s) IV Push once  dextrose 50% Injectable 25 Gram(s) IV Push once  ferrous    sulfate 325 milliGRAM(s) Oral daily  furosemide   Injectable 40 milliGRAM(s) IV Push every 12 hours  furosemide   Injectable 40 milliGRAM(s) IV Push once  guaiFENesin  milliGRAM(s) Oral every 12 hours  insulin lispro (HumaLOG) corrective regimen sliding scale   SubCutaneous three times a day before meals  insulin lispro (HumaLOG) corrective regimen sliding scale   SubCutaneous at bedtime  montelukast 10 milliGRAM(s) Oral daily  multivitamin/minerals 1 Tablet(s) Oral daily  pantoprazole    Tablet 40 milliGRAM(s) Oral two times a day  predniSONE   Tablet 30 milliGRAM(s) Oral daily  sertraline 50 milliGRAM(s) Oral daily  sodium chloride 3%  Inhalation 3 milliLiter(s) Inhalation every 6 hours  tamsulosin 0.4 milliGRAM(s) Oral two times a day    MEDICATIONS  (PRN):  dextrose 40% Gel 15 Gram(s) Oral once PRN Blood Glucose LESS THAN 70 milliGRAM(s)/deciliter  glucagon  Injectable 1 milliGRAM(s) IntraMuscular once PRN Glucose LESS THAN 70 milligrams/deciliter  guaiFENesin   Syrup  (Sugar-Free) 200 milliGRAM(s) Oral every 6 hours PRN Cough    Allergies    No Known Allergies    Intolerances    Vital Signs Last 24 Hrs  T(C): 37 (22 Feb 2020 07:20), Max: 37 (22 Feb 2020 07:20)  T(F): 98.6 (22 Feb 2020 07:20), Max: 98.6 (22 Feb 2020 07:20)  HR: 75 (22 Feb 2020 07:21) (58 - 86)  BP: 128/66 (22 Feb 2020 07:20) (128/66 - 161/90)  BP(mean): --  RR: 18 (22 Feb 2020 07:20) (18 - 20)  SpO2: 97% (22 Feb 2020 07:21) (94% - 100%)  I&O's Summary    21 Feb 2020 07:01  -  22 Feb 2020 07:00  --------------------------------------------------------  IN: 1380 mL / OUT: 3500 mL / NET: -2120 mL    22 Feb 2020 07:01  -  22 Feb 2020 10:33  --------------------------------------------------------  IN: 0 mL / OUT: 450 mL / NET: -450 mL     PHYSICAL EXAM:  TELE: Not on tele  Constitutional: NAD, awake and alert, well-developed  HEENT: Moist Mucous Membranes, Anicteric  Pulmonary: Non-labored, breath sounds are very diminished bilaterally, very loud wheezing.  Fine rales.  No rhonchi  Cardiovascular: Regular, S1 and S2, No murmurs, rubs, gallops or clicks  Gastrointestinal: Bowel Sounds present, soft, nontender.   Lymph: No peripheral edema. No lymphadenopathy.  Skin: No visible rashes or ulcers.  Psych:  Mood & affect appropriate  LABS: All Labs Reviewed:                        9.4    10.26 )-----------( 133      ( 22 Feb 2020 06:16 )             28.9                         9.1    8.10  )-----------( 111      ( 21 Feb 2020 07:03 )             27.4                         8.4    6.04  )-----------( 110      ( 20 Feb 2020 09:02 )             25.6     22 Feb 2020 06:16    137    |  102    |  56     ----------------------------<  144    4.0     |  25     |  1.30   21 Feb 2020 07:03    138    |  105    |  54     ----------------------------<  179    4.8     |  24     |  1.60   20 Feb 2020 09:02    138    |  104    |  44     ----------------------------<  155    4.4     |  24     |  1.70     Ca    9.2        22 Feb 2020 06:16  Ca    9.1        21 Feb 2020 07:03  Ca    9.3        20 Feb 2020 09:02  Mg     2.3       21 Feb 2020 07:03    TPro  7.4    /  Alb  3.5    /  TBili  0.4    /  DBili  x      /  AST  46     /  ALT  26     /  AlkPhos  51     21 Feb 2020 07:03  TPro  6.9    /  Alb  3.4    /  TBili  0.6    /  DBili  x      /  AST  19     /  ALT  15     /  AlkPhos  46     20 Feb 2020 09:02    < from: MARLENE w/o TTE (w/3D Echo) (10.12.17 @ 07:55) >    Patient name: ANNAMARIA AVALOS  YOB: 1931   Age: 86 (M)   MR#: [id number]  Study Date: 10/12/2017  Location: O/PSonographer: Gomez Reid M.D.  Study quality: Technically Difficult  Referring Physician: Weston Gray MD  Blood Pressure: 144/74 mmHg  Height: 188 cm  Weight: 91 kg  BSA: 2.2 m2  ------------------------------------------------------------------------  PROCEDURE: Transesophageal (MARLENE) was performed in the  echocardiography laboratory.  Informed consent was first  obtained for MARLENE.  The patient was sedated by Anesthesia.  The procedure was monitored with automatic blood pressure  monitoring, ECG tracings and pulse oximetry.  Gag reflex  was abolished with topical Cetacaine and the  transesophageal probe was placed in the esophagus posterior  to the heart without complications.  The patient tolerated  the procedure well.   Real-time and reconstructed  3-dimensional imaging was performed.  Color Doppler  analysis was carried out using both 2D and 3D mapping.  Patient was injected with 10 cc's of aerosolized saline.  INDICATION: Unspecified atrial fibrillation (I48.91)  ------------------------------------------------------------------------  OBSERVATIONS:  Mitral Valve: Mitral annular calcification, otherwise  normal mitral valve. Mild mitral regurgitation.  Aortic Root: Moderate aortic root dilatation  (Ao: 4.8 cm  at the sinuses of Valsalva).  Mild non-mobile  atherosclerotic plaque in the aortic arch and descending  thoracic aorta.  Aortic Valve: Calcified trileaflet aortic valve with normal  opening. Mild aortic regurgitation.  Left Atrium: Dilated left atrium.  A Watchman closure  device is present and is well seated at the ostium of the  left atrial appendage (NYA).  Colour Doppler interrogation  demonstrates a small amount of residual flow around the  device.  The width of the joel-device jet is about  2-3 mm  by colour Doppler interrogation.  The joel-device flow  occurs on the medial aspect of the device.  Left Ventricle: Endocardium not well visualized; grossly  normal left ventricular systolic function.  Right Heart: Normal right atrium. Normal right ventricular  size and function.  A device wire is noted in the right  heart. Normal tricuspid valve.  Minimal tricuspid  regurgitation.Normal pulmonic valve.  Pericardium/PleuraNormal pericardium with no pericardial  effusion.  ------------------------------------------------------------------------  CONCLUSIONS:  1. Mitral annular calcification, otherwise normal mitral  valve. Mildmitral regurgitation.  2. Calcified trileaflet aortic valve with normal opening.  Mild aortic regurgitation.  3. Moderate aortic root dilatation  (Ao: 4.8 cm at the  sinuses of Valsalva).  Mild non-mobile atherosclerotic  plaque in the aortic arch and descending thoracic aorta.  4. Dilated left atrium.  A Watchman closure device is  present and is well seated at the ostium of the left atrial  appendage (NYA).  Colour Doppler interrogation demonstrates  a small amount of residual flow around the device.  The  width of the joel-device jet is about  2-3 mm by colour  Doppler interrogation.  The joel-device flow occurs on the  medial aspect of the device.  5. Endocardium not well visualized; grossly normal left  ventricular systolic function.  6.Normal right ventricular size and function.  A device  wire is noted in the right heart.  7. Contrast injection demonstrates no evidence of a patent  foramen ovale.  ------------------------------------------------------------------------  Confirmed on  10/12/2017 - 14:41:46 by Mario Alberto Villareal M.D.  ------------------------------------------------------------------------    < end of copied text >    < from: CT Chest No Cont (02.20.20 @ 11:13) >    EXAM:  CT CHEST                          PROCEDURE DATE:  02/20/2020      INTERPRETATION:  CLINICAL INFORMATION: Shortness of breath.    COMPARISON: Chest CT 6/28/2013.    PROCEDURE:   CT of the Chest was performed without intravenous contrast.  Sagittal and coronal reformats were performed.      FINDINGS:    LUNGS AND AIRWAYS: Patent central airways.  Diffuse interstitial prominence throughout both lungs. There are patchy and nodular tree-in-bud type opacities throughout both lungs but predominantly in the right lower lobe, likely infectious in etiology. There is diffuse intralobular septal thickening, likely representing pulmonary vascular congestion.    PLEURA: No pleural effusion.    MEDIASTINUM AND TAMIA: No lymphadenopathy.    VESSELS: The thoracic aorta and main pulmonary artery are normal in caliber. There is heavy coronary artery calcification.    HEART: Heart size is enlarged. There are pacemaker leads in the right atrium and right ventricle. There is aortic valvular calcification. A device is present within the left atrial appendage. No pericardial effusion.    CHEST WALL AND LOWER NECK: The thyroid gland is within normal limits. There is no supraclavicular or axillary lymphadenopathy. There is a pacemaker generator pack in the left anterior chest wall with leads through a left subclavian approach.    VISUALIZED UPPER ABDOMEN: The adrenal glands are within normal limits. The imaged portions of the unenhanced liver, spleen, are within normal limits. The pancreas is atrophic. What likely represents cholecystectomy clips are noted in the gallbladder fossa. Small hiatal hernia.    BONES: Multilevel degenerative change of the thoracolumbar spine with osteophytes, degenerative disc disease and facet joint arthropathy. There are median sternotomy wires.    IMPRESSION:     Pulmonary vascular congestion.  Patchy and nodular opacities in both lungs, particularly in the right lower lobe, likely reflecting pneumonia.    SHEY PORTILLO M.D. ATTENDING RADIOLOGIST  This document has been electronically signed. Feb 20 2020 11:31AM     < end of copied text >    < from: 12 Lead ECG (02.20.20 @ 08:24) >    Ventricular Rate 83 BPM    Atrial Rate 85 BPM    P-R Interval 196 ms    QRS Duration 200 ms    Q-T Interval 476 ms    QTC Calculation(Bezet) 559 ms    R Axis -50 degrees    T Axis 120 degrees    Diagnosis Line AV dual-paced rhythm with frequent premature ventricular complexes  Abnormal ECG  When compared with ECG of 28-JUN-2013 21:25,  premature ventricular complexes are now present  Vent. rate has increased BY  23 BPM  Confirmed by BERT ZAVALA ([id number]) on 2/20/2020 3:06:14 PM    < end of copied text >

## 2020-02-22 NOTE — PROGRESS NOTE ADULT - SUBJECTIVE AND OBJECTIVE BOX
INTERVAL HPI/OVERNIGHT EVENTS:  Patient seen and examined at bedside. Early this AM, patient in respiratory distress, improved after duoneb and IV lasix. Patient states he feels well, denies any CP, breathing slightly labored.    MEDICATIONS  (STANDING):  albuterol/ipratropium for Nebulization. 3 milliLiter(s) Nebulizer every 6 hours  ascorbic acid 500 milliGRAM(s) Oral daily  aspirin  chewable 81 milliGRAM(s) Oral daily  carvedilol 25 milliGRAM(s) Oral every 12 hours  cefTRIAXone   IVPB 1000 milliGRAM(s) IV Intermittent every 24 hours  dextrose 5%. 1000 milliLiter(s) (50 mL/Hr) IV Continuous <Continuous>  dextrose 50% Injectable 12.5 Gram(s) IV Push once  dextrose 50% Injectable 25 Gram(s) IV Push once  dextrose 50% Injectable 25 Gram(s) IV Push once  ferrous    sulfate 325 milliGRAM(s) Oral daily  furosemide   Injectable 40 milliGRAM(s) IV Push every 12 hours  guaiFENesin  milliGRAM(s) Oral every 12 hours  insulin lispro (HumaLOG) corrective regimen sliding scale   SubCutaneous three times a day before meals  insulin lispro (HumaLOG) corrective regimen sliding scale   SubCutaneous at bedtime  montelukast 10 milliGRAM(s) Oral daily  multivitamin/minerals 1 Tablet(s) Oral daily  pantoprazole    Tablet 40 milliGRAM(s) Oral two times a day  predniSONE   Tablet 30 milliGRAM(s) Oral daily  sertraline 50 milliGRAM(s) Oral daily  sodium chloride 3%  Inhalation 3 milliLiter(s) Inhalation every 6 hours  tamsulosin 0.4 milliGRAM(s) Oral two times a day    MEDICATIONS  (PRN):  dextrose 40% Gel 15 Gram(s) Oral once PRN Blood Glucose LESS THAN 70 milliGRAM(s)/deciliter  glucagon  Injectable 1 milliGRAM(s) IntraMuscular once PRN Glucose LESS THAN 70 milligrams/deciliter  guaiFENesin   Syrup  (Sugar-Free) 200 milliGRAM(s) Oral every 6 hours PRN Cough      Allergies    No Known Allergies    Intolerances      ROS:  CONSTITUTIONAL: + weakness, no fevers or chills  EYES/ENT: No visual changes;  No vertigo or throat pain   NECK: No pain or stiffness  RESPIRATORY: + cough, wheezing, no hemoptysis; + shortness of breath  CARDIOVASCULAR: No chest pain or palpitations  GASTROINTESTINAL: No abdominal or epigastric pain. No nausea, vomiting, or hematemesis.  GENITOURINARY: No dysuria, frequency or hematuria  NEUROLOGICAL: No numbness  SKIN: No itching, burning, rashes, or lesions   All other review of systems is negative unless indicated above.    Vital Signs Last 24 Hrs  T(C): 36.7 (2020 15:20), Max: 37 (2020 07:20)  T(F): 98.1 (2020 15:20), Max: 98.6 (2020 07:20)  HR: 64 (2020 15:20) (58 - 86)  BP: 136/72 (2020 15:20) (126/22 - 156/85)  BP(mean): --  RR: 20 (2020 15:20) (18 - 20)  SpO2: 100% (2020 15:20) (93% - 100%)     @ 07:01  -   @ 07:00  --------------------------------------------------------  IN: 1380 mL / OUT: 3500 mL / NET: -2120 mL     @ 07:01  -   @ 16:05  --------------------------------------------------------  IN: 300 mL / OUT: 2300 mL / NET: -2000 mL      Physical Exam:  General: WN/WD NAD  Neurology: A&Ox3, nonfocal, BLACK x 4  Respiratory: +wheezing and coarse breath sounds throughout  CV: RRR, S1S2  Abdominal: Soft, NT, ND +BS  Extremities: No edema, + peripheral pulses      LABS:                        9.4    10.26 )-----------( 133      ( 2020 06:16 )             28.9         137  |  102  |  56<H>  ----------------------------<  144<H>  4.0   |  25  |  1.30    Ca    9.2      2020 06:16  Mg     2.3         TPro  7.4  /  Alb  3.5  /  TBili  0.4  /  DBili  x   /  AST  46<H>  /  ALT  26  /  AlkPhos  51        Urinalysis Basic - ( 2020 19:24 )    Color: Yellow / Appearance: Clear / S.010 / pH: x  Gluc: x / Ketone: Negative  / Bili: Negative / Urobili: Negative   Blood: x / Protein: 15 / Nitrite: Negative   Leuk Esterase: Negative / RBC: 0-2 /HPF / WBC 0-2   Sq Epi: x / Non Sq Epi: Occasional / Bacteria: Occasional        RADIOLOGY & ADDITIONAL TESTS:

## 2020-02-22 NOTE — PROGRESS NOTE ADULT - ASSESSMENT
89 yo male PMH of Afib (s/p Watchman procedure 2017 on Xarelto currently being held as of 2 weeks for blood in stool), remote CABG, PCIX4 last one being 2016, Pacemaker, HTN, HLD, DM2 presenting for cough and SOB.    SOB  - SOB likely multifactorial given PNA/CHF/anemia  - He remains dyspneic and with significant wheezing even at rest  - CT chest with pulmonary congestion and PNA; Pro-BNP=>4500  - Given Lasix 40 gm IV yesterday and today.  - Will give an extra dose of 40 mg Lasix IV and start q12H  - Please continue to maintain strict I/Os, monitor daily weights, Cr, and K.   - Follow up TTE  - Continue with steroids, abx, nebs as per pulm and primary team   - Continue to encourgae incentive spirometer.      Afib s/p Watchman device (2017); PPM  - Vpacing while on tele, now off  - Unsure why still on Xarelto PTA.  He follows with Dr. Gonzalez ([facility])  - Continue to hold Xarelto for melena  - Monitor h/h; remained stable. Transfuse to keep Hgb>8  - Monitor electrolytes, replete to keep K>4 and Mag>2  - Continue Coreg    CAD s/p CABG  - CAD appears to be stable.   - EKG without sig ischemic changes  - On home nitroglycerin patch.  Recommend to hold for now  - Continue ASA  - Unsure why not on statin  - Continue Coreg  - Not on ACEI/ARB due to GILBERT, now resolved.  Consider starting in am if remains renally stable    GILBERT  - Monitor renal indices.  Creatinine normalized  - Avoid nephrotoxics  - Renal following    GIB  - Follow FOBT  - Follow GI eval  - Continue to trend Hgb    Further cardiac workup will depend on clinical course.   All other workup per primary team. Will followup.     Christen Ma DNP, NP-C  Cardiology   Spectra #[phone]

## 2020-02-22 NOTE — PROGRESS NOTE ADULT - ASSESSMENT
88 white male with a history of HTN, CAD, CHF, CABG, AF, DM. PVD, CKD now admitted with a history of SOB, LEE and cough with congestion.   GILBERT on CKD most likely due to ATN secondary to sepsis complicated by ARB and diuretics.   Will continue diuretics as ordered and hold the ARB for now.   renal sonogram shows chronic echogenicity indicating mild CKD.

## 2020-02-22 NOTE — CHART NOTE - NSCHARTNOTEFT_GEN_A_CORE
Called by RN for Pt c/o wheezing and sob. Pt seen and examined at beside. Wheezing audible without auscultation. Pt minimally sob on nasal canula. Patient denies CP, palpations, dizziness, lightheadedness. Admits to mucus and phlegm that has been difficult to bring up.        T(C): 36.3 (20 @ 05:05), Max: 36.8 (20 @ 19:05)  HR: 85 (20 @ 06:24) (58 - 86)  BP: 133/71 (20 @ 05:05) (133/71 - 161/90)  RR: 18 (20 @ 05:05) (18 - 24)  SpO2: 97% (20 @ 06:24) (94% - 100%)  Wt(kg): --    Physical :  Gen- NAD, ncat, resting in bed  Cardio - s+1,s+2, rrr, no murmur  Lung - decreased bs, +diffuse wheezing, rales b/l bases, no rhonchi, on NC  Abdomen- +BS, NT/ND, no guarding, no rebound, no masses  Ext- no edema, 2+ pulses b/l  Neuro- CN grossly intact, nonfocal    LABS:                        9.4    10.26 )-----------( 133      ( 2020 06:16 )             28.9         138  |  105  |  54<H>  ----------------------------<  179<H>  4.8   |  24  |  1.60<H>    Ca    9.1      2020 07:03  Mg     2.3         TPro  7.4  /  Alb  3.5  /  TBili  0.4  /  DBili  x   /  AST  46<H>  /  ALT  26  /  AlkPhos  51      PT/INR - ( 2020 09:02 )   PT: 14.5 sec;   INR: 1.28 ratio         PTT - ( 2020 09:02 )  PTT:30.6 sec  Urinalysis Basic - ( 2020 19:24 )    Color: Yellow / Appearance: Clear / S.010 / pH: x  Gluc: x / Ketone: Negative  / Bili: Negative / Urobili: Negative   Blood: x / Protein: 15 / Nitrite: Negative   Leuk Esterase: Negative / RBC: 0-2 /HPF / WBC 0-2   Sq Epi: x / Non Sq Epi: Occasional / Bacteria: Occasional              Assessment/Plan  88yMale admitted for admitted with acute copd exacerbation, suspected CHF exacerbation, and RLL PNA, CAP. Now with wheezing and SOB    - vital stable; pt sating at 97% on NC  - Given 1 stat Duoneb tx with some improvement; 2nd stat duoneb tx ordered  - 40mg IV lasix ordered stat as physical exam consistent with fluid overload and likely contributing to symptoms  - pt received scheduled AM Mucinex and prednisone 30mg  - RN to call with changes    Dr Roger  [id number]  [id number]

## 2020-02-23 LAB
ANION GAP SERPL CALC-SCNC: 7 MMOL/L — SIGNIFICANT CHANGE UP (ref 5–17)
BUN SERPL-MCNC: 60 MG/DL — HIGH (ref 7–23)
CALCIUM SERPL-MCNC: 9.3 MG/DL — SIGNIFICANT CHANGE UP (ref 8.5–10.1)
CHLORIDE SERPL-SCNC: 100 MMOL/L — SIGNIFICANT CHANGE UP (ref 96–108)
CO2 SERPL-SCNC: 32 MMOL/L — HIGH (ref 22–31)
CREAT SERPL-MCNC: 1.5 MG/DL — HIGH (ref 0.5–1.3)
GLUCOSE SERPL-MCNC: 200 MG/DL — HIGH (ref 70–99)
HCT VFR BLD CALC: 28.5 % — LOW (ref 39–50)
HGB BLD-MCNC: 9.5 G/DL — LOW (ref 13–17)
MCHC RBC-ENTMCNC: 30.2 PG — SIGNIFICANT CHANGE UP (ref 27–34)
MCHC RBC-ENTMCNC: 33.3 GM/DL — SIGNIFICANT CHANGE UP (ref 32–36)
MCV RBC AUTO: 90.5 FL — SIGNIFICANT CHANGE UP (ref 80–100)
NRBC # BLD: 0 /100 WBCS — SIGNIFICANT CHANGE UP (ref 0–0)
PLATELET # BLD AUTO: 129 K/UL — LOW (ref 150–400)
POTASSIUM SERPL-MCNC: 3.7 MMOL/L — SIGNIFICANT CHANGE UP (ref 3.5–5.3)
POTASSIUM SERPL-SCNC: 3.7 MMOL/L — SIGNIFICANT CHANGE UP (ref 3.5–5.3)
RBC # BLD: 3.15 M/UL — LOW (ref 4.2–5.8)
RBC # FLD: 14.3 % — SIGNIFICANT CHANGE UP (ref 10.3–14.5)
SODIUM SERPL-SCNC: 139 MMOL/L — SIGNIFICANT CHANGE UP (ref 135–145)
WBC # BLD: 7.08 K/UL — SIGNIFICANT CHANGE UP (ref 3.8–10.5)
WBC # FLD AUTO: 7.08 K/UL — SIGNIFICANT CHANGE UP (ref 3.8–10.5)

## 2020-02-23 PROCEDURE — 99232 SBSQ HOSP IP/OBS MODERATE 35: CPT

## 2020-02-23 RX ADMIN — Medication 2: at 17:20

## 2020-02-23 RX ADMIN — Medication 600 MILLIGRAM(S): at 17:20

## 2020-02-23 RX ADMIN — CARVEDILOL PHOSPHATE 25 MILLIGRAM(S): 80 CAPSULE, EXTENDED RELEASE ORAL at 06:29

## 2020-02-23 RX ADMIN — Medication 3 MILLILITER(S): at 13:19

## 2020-02-23 RX ADMIN — Medication 3 MILLILITER(S): at 20:17

## 2020-02-23 RX ADMIN — TAMSULOSIN HYDROCHLORIDE 0.4 MILLIGRAM(S): 0.4 CAPSULE ORAL at 06:29

## 2020-02-23 RX ADMIN — SODIUM CHLORIDE 3 MILLILITER(S): 9 INJECTION INTRAMUSCULAR; INTRAVENOUS; SUBCUTANEOUS at 07:28

## 2020-02-23 RX ADMIN — PANTOPRAZOLE SODIUM 40 MILLIGRAM(S): 20 TABLET, DELAYED RELEASE ORAL at 06:30

## 2020-02-23 RX ADMIN — Medication 40 MILLIGRAM(S): at 17:20

## 2020-02-23 RX ADMIN — Medication 1: at 07:53

## 2020-02-23 RX ADMIN — Medication 2: at 12:22

## 2020-02-23 RX ADMIN — PANTOPRAZOLE SODIUM 40 MILLIGRAM(S): 20 TABLET, DELAYED RELEASE ORAL at 17:20

## 2020-02-23 RX ADMIN — SERTRALINE 50 MILLIGRAM(S): 25 TABLET, FILM COATED ORAL at 12:21

## 2020-02-23 RX ADMIN — Medication 30 MILLIGRAM(S): at 06:29

## 2020-02-23 RX ADMIN — Medication 500 MILLIGRAM(S): at 12:22

## 2020-02-23 RX ADMIN — TAMSULOSIN HYDROCHLORIDE 0.4 MILLIGRAM(S): 0.4 CAPSULE ORAL at 17:20

## 2020-02-23 RX ADMIN — CARVEDILOL PHOSPHATE 25 MILLIGRAM(S): 80 CAPSULE, EXTENDED RELEASE ORAL at 17:20

## 2020-02-23 RX ADMIN — Medication 3 MILLILITER(S): at 07:28

## 2020-02-23 RX ADMIN — Medication 40 MILLIGRAM(S): at 06:29

## 2020-02-23 RX ADMIN — SODIUM CHLORIDE 3 MILLILITER(S): 9 INJECTION INTRAMUSCULAR; INTRAVENOUS; SUBCUTANEOUS at 00:57

## 2020-02-23 RX ADMIN — Medication 3 MILLILITER(S): at 00:57

## 2020-02-23 RX ADMIN — SODIUM CHLORIDE 3 MILLILITER(S): 9 INJECTION INTRAMUSCULAR; INTRAVENOUS; SUBCUTANEOUS at 13:19

## 2020-02-23 RX ADMIN — CEFTRIAXONE 100 MILLIGRAM(S): 500 INJECTION, POWDER, FOR SOLUTION INTRAMUSCULAR; INTRAVENOUS at 09:38

## 2020-02-23 RX ADMIN — MONTELUKAST 10 MILLIGRAM(S): 4 TABLET, CHEWABLE ORAL at 12:21

## 2020-02-23 RX ADMIN — Medication 325 MILLIGRAM(S): at 12:21

## 2020-02-23 RX ADMIN — Medication 1 TABLET(S): at 12:21

## 2020-02-23 RX ADMIN — SODIUM CHLORIDE 3 MILLILITER(S): 9 INJECTION INTRAMUSCULAR; INTRAVENOUS; SUBCUTANEOUS at 20:16

## 2020-02-23 RX ADMIN — Medication 81 MILLIGRAM(S): at 12:21

## 2020-02-23 RX ADMIN — Medication 600 MILLIGRAM(S): at 06:28

## 2020-02-23 NOTE — PROGRESS NOTE ADULT - SUBJECTIVE AND OBJECTIVE BOX
Date/Time Patient Seen:  		  Referring MD:   Data Reviewed	       Patient is a 88y old  Male who presents with a chief complaint of shortness of breath (22 Feb 2020 16:05)      Subjective/HPI     PAST MEDICAL & SURGICAL HISTORY:  Coronary artery disease involving native coronary artery of native heart, angina presence unspecified  Persistent atrial fibrillation: wwas on eliquis, stopped due to gi bleed workup underway  Type 2 diabetes mellitus without complication, without long-term current use of insulin  Hyperlipemia  MI, old  Hypertension  Sepsis: sec to prostate bx  S/P coronary artery bypass graft x 3  Pacemaker: checked at pmd/cardio office 1 week ago  Presence of Watchman left atrial appendage closure device  H/O prostate biopsy  History of cholecystectomy  Artificial cardiac pacemaker  Stented coronary artery  H/O coronary angioplasty: 4 stents  S/P CABG x 3  No significant past surgical history        Medication list         MEDICATIONS  (STANDING):  albuterol/ipratropium for Nebulization. 3 milliLiter(s) Nebulizer every 6 hours  ascorbic acid 500 milliGRAM(s) Oral daily  aspirin  chewable 81 milliGRAM(s) Oral daily  carvedilol 25 milliGRAM(s) Oral every 12 hours  cefTRIAXone   IVPB 1000 milliGRAM(s) IV Intermittent every 24 hours  dextrose 5%. 1000 milliLiter(s) (50 mL/Hr) IV Continuous <Continuous>  dextrose 50% Injectable 12.5 Gram(s) IV Push once  dextrose 50% Injectable 25 Gram(s) IV Push once  dextrose 50% Injectable 25 Gram(s) IV Push once  ferrous    sulfate 325 milliGRAM(s) Oral daily  furosemide   Injectable 40 milliGRAM(s) IV Push every 12 hours  guaiFENesin  milliGRAM(s) Oral every 12 hours  insulin lispro (HumaLOG) corrective regimen sliding scale   SubCutaneous three times a day before meals  insulin lispro (HumaLOG) corrective regimen sliding scale   SubCutaneous at bedtime  montelukast 10 milliGRAM(s) Oral daily  multivitamin/minerals 1 Tablet(s) Oral daily  pantoprazole    Tablet 40 milliGRAM(s) Oral two times a day  predniSONE   Tablet 30 milliGRAM(s) Oral daily  sertraline 50 milliGRAM(s) Oral daily  sodium chloride 3%  Inhalation 3 milliLiter(s) Inhalation every 6 hours  tamsulosin 0.4 milliGRAM(s) Oral two times a day    MEDICATIONS  (PRN):  dextrose 40% Gel 15 Gram(s) Oral once PRN Blood Glucose LESS THAN 70 milliGRAM(s)/deciliter  glucagon  Injectable 1 milliGRAM(s) IntraMuscular once PRN Glucose LESS THAN 70 milligrams/deciliter  guaiFENesin   Syrup  (Sugar-Free) 200 milliGRAM(s) Oral every 6 hours PRN Cough         Vitals log        ICU Vital Signs Last 24 Hrs  T(C): 36.3 (23 Feb 2020 07:40), Max: 36.7 (22 Feb 2020 12:30)  T(F): 97.3 (23 Feb 2020 07:40), Max: 98.1 (22 Feb 2020 15:20)  HR: 60 (23 Feb 2020 07:40) (60 - 77)  BP: 150/60 (23 Feb 2020 07:40) (113/59 - 158/67)  BP(mean): --  ABP: --  ABP(mean): --  RR: 20 (23 Feb 2020 07:40) (18 - 20)  SpO2: 100% (23 Feb 2020 07:40) (93% - 100%)           Input and Output:  I&O's Detail    22 Feb 2020 07:01  -  23 Feb 2020 07:00  --------------------------------------------------------  IN:    Solution: 250 mL    Solution: 50 mL  Total IN: 300 mL    OUT:    Voided: 3100 mL  Total OUT: 3100 mL    Total NET: -2800 mL      23 Feb 2020 07:01  -  23 Feb 2020 09:09  --------------------------------------------------------  IN:  Total IN: 0 mL    OUT:    Voided: 2900 mL  Total OUT: 2900 mL    Total NET: -2900 mL          Lab Data                        9.4    10.26 )-----------( 133      ( 22 Feb 2020 06:16 )             28.9     02-22    137  |  102  |  56<H>  ----------------------------<  144<H>  4.0   |  25  |  1.30    Ca    9.2      22 Feb 2020 06:16              Review of Systems	      Objective     Physical Examination    heart s1s2  lung dec BS  abd soft  head nc      Pertinent Lab findings & Imaging      Sarah:  NO   Adequate UO     I&O's Detail    22 Feb 2020 07:01  -  23 Feb 2020 07:00  --------------------------------------------------------  IN:    Solution: 250 mL    Solution: 50 mL  Total IN: 300 mL    OUT:    Voided: 3100 mL  Total OUT: 3100 mL    Total NET: -2800 mL      23 Feb 2020 07:01  -  23 Feb 2020 09:09  --------------------------------------------------------  IN:  Total IN: 0 mL    OUT:    Voided: 2900 mL  Total OUT: 2900 mL    Total NET: -2900 mL               Discussed with:     Cultures:	        Radiology

## 2020-02-23 NOTE — PROGRESS NOTE ADULT - ASSESSMENT
89 yo male PMH of Afib (s/p Watchman procedure 2017 on Xarelto currently being held as of 2 weeks for blood in stool), remote CABG, PCIX4 last one being 2016, Pacemaker, HTN, HLD, DM2 presenting for cough and SOB.    SOB  - SOB likely multifactorial given PNA/CHF/anemia  - CT chest with pulmonary congestion and PNA; Pro-BNP=>4500  - Continue with lasix BID , negative 2900 cc 24 hours   - Please continue to maintain strict I/Os, monitor daily weights, Cr, and K.   - Follow up TTE  - Continue with steroids, abx, nebs as per pulm and primary team   - Continue to encourgae incentive spirometer.      Afib s/p Watchman device (2017); PPM  - Continue to hold Xarelto for anemia- unclear why patient was still on xarelto given he is sp watchman procedure - follows with Dr Gonzalez   - Anemia work up as per primary team   - Monitor electrolytes, replete to keep K>4 and Mag>2  - Continue Coreg    CAD s/p CABG  - CAD appears to be stable.   - EKG without sig ischemic changes  - Continue ASA  - Unsure why not on statin - start if not contraindicated   - Continue Coreg  - Not on ACEI/ARB due to GILBERT, now resolved.  Consider starting in am if remains renally stable    GILBERT  - Monitor renal indices.  Creatinine normalized  - Avoid nephrotoxics  - Renal following    GIB  - Follow FOBT  - Continue to trend Hgb    Further cardiac workup will depend on clinical course.   All other workup per primary team. Will followup.    Aziza Elizabeth FNP-C  Cardiology NP  [facility] [phone] 89 yo male PMH of Afib (s/p Watchman procedure 2017 on Xarelto currently being held as of 2 weeks for blood in stool), remote CABG, PCIX4 last one being 2016, Pacemaker, HTN, HLD, DM2 presenting for cough and SOB.    SOB  - SOB likely multifactorial given PNA/CHF/anemia  - CT chest with pulmonary congestion and PNA; Pro-BNP=>4500  - Continue with lasix BID , negative 2900 cc 24 hours   - Please continue to maintain strict I/Os, monitor daily weights, Cr, and K.   - Follow up TTE  - Continue with steroids, abx, nebs as per pulm and primary team   - Continue to encourgae incentive spirometer.      Afib s/p Watchman device (2017); PPM  - Continue to hold Xarelto for anemia- unclear why patient was still on xarelto given he is sp watchman procedure - follows with Dr Gonzalez   - Anemia work up as per primary team   - Monitor electrolytes, replete to keep K>4 and Mag>2  - Continue Coreg    CAD s/p CABG  - CAD appears to be stable.   - EKG without sig ischemic changes  - Continue ASA  - Unsure why not on statin - start if not contraindicated   - Continue Coreg  - Not on ACEI/ARB due to GILBERT    GILBERT  - Monitor renal indices.  Creatinine normalized  - Avoid nephrotoxics  - Renal following    GIB  - Follow FOBT  - Continue to trend Hgb    Further cardiac workup will depend on clinical course.   All other workup per primary team. Will followup.    Aziza Elizabeth FNP-C  Cardiology NP  [facility] [phone]

## 2020-02-23 NOTE — PROGRESS NOTE ADULT - SUBJECTIVE AND OBJECTIVE BOX
INTERVAL HPI/OVERNIGHT EVENTS:  Patient seen and examined at bedside. Appears much more comfortable today, states breathing improved. Still with SOB on exertion, denies any CP, abd pain. States he is now able to bring up sputum.    MEDICATIONS  (STANDING):  albuterol/ipratropium for Nebulization. 3 milliLiter(s) Nebulizer every 6 hours  ascorbic acid 500 milliGRAM(s) Oral daily  aspirin  chewable 81 milliGRAM(s) Oral daily  carvedilol 25 milliGRAM(s) Oral every 12 hours  cefTRIAXone   IVPB 1000 milliGRAM(s) IV Intermittent every 24 hours  dextrose 5%. 1000 milliLiter(s) (50 mL/Hr) IV Continuous <Continuous>  dextrose 50% Injectable 12.5 Gram(s) IV Push once  dextrose 50% Injectable 25 Gram(s) IV Push once  dextrose 50% Injectable 25 Gram(s) IV Push once  ferrous    sulfate 325 milliGRAM(s) Oral daily  furosemide   Injectable 40 milliGRAM(s) IV Push every 12 hours  guaiFENesin  milliGRAM(s) Oral every 12 hours  insulin lispro (HumaLOG) corrective regimen sliding scale   SubCutaneous three times a day before meals  insulin lispro (HumaLOG) corrective regimen sliding scale   SubCutaneous at bedtime  montelukast 10 milliGRAM(s) Oral daily  multivitamin/minerals 1 Tablet(s) Oral daily  pantoprazole    Tablet 40 milliGRAM(s) Oral two times a day  predniSONE   Tablet 30 milliGRAM(s) Oral daily  sertraline 50 milliGRAM(s) Oral daily  sodium chloride 3%  Inhalation 3 milliLiter(s) Inhalation every 6 hours  tamsulosin 0.4 milliGRAM(s) Oral two times a day    MEDICATIONS  (PRN):  dextrose 40% Gel 15 Gram(s) Oral once PRN Blood Glucose LESS THAN 70 milliGRAM(s)/deciliter  glucagon  Injectable 1 milliGRAM(s) IntraMuscular once PRN Glucose LESS THAN 70 milligrams/deciliter  guaiFENesin   Syrup  (Sugar-Free) 200 milliGRAM(s) Oral every 6 hours PRN Cough      Allergies    No Known Allergies    Intolerances      ROS:  CONSTITUTIONAL: No weakness, fevers or chills  EYES/ENT: No visual changes;  No vertigo or throat pain   NECK: No pain or stiffness  RESPIRATORY: + cough, wheezing, no hemoptysis; + shortness of breath on exertion  CARDIOVASCULAR: No chest pain or palpitations  GASTROINTESTINAL: No abdominal or epigastric pain. No nausea, vomiting, or hematemesis  GENITOURINARY: No dysuria, frequency or hematuria  NEUROLOGICAL: No numbness or weakness  SKIN: No itching, burning, rashes, or lesions   All other review of systems is negative unless indicated above.    Vital Signs Last 24 Hrs  T(C): 36.4 (23 Feb 2020 15:05), Max: 36.6 (22 Feb 2020 23:36)  T(F): 97.5 (23 Feb 2020 15:05), Max: 97.9 (22 Feb 2020 23:36)  HR: 69 (23 Feb 2020 15:05) (60 - 77)  BP: 121/62 (23 Feb 2020 15:05) (113/59 - 158/67)  BP(mean): --  RR: 19 (23 Feb 2020 15:05) (18 - 20)  SpO2: 98% (23 Feb 2020 15:05) (95% - 100%)    02-22 @ 07:01  -  02-23 @ 07:00  --------------------------------------------------------  IN: 300 mL / OUT: 3100 mL / NET: -2800 mL    02-23 @ 07:01  -  02-23 @ 16:58  --------------------------------------------------------  IN: 0 mL / OUT: 2900 mL / NET: -2900 mL      Physical Exam:  General: WN/WD NAD  Neurology: A&Ox3, nonfocal, BLACK x 4  Respiratory: +wheezing and coarse breath sounds throughout- but improved aeration  CV: RRR, S1S2  Abdominal: Soft, NT, ND +BS  Extremities: No edema, + peripheral pulses      LABS:                        9.5    7.08  )-----------( 129      ( 23 Feb 2020 09:13 )             28.5     02-23    139  |  100  |  60<H>  ----------------------------<  200<H>  3.7   |  32<H>  |  1.50<H>    Ca    9.3      23 Feb 2020 09:13            RADIOLOGY & ADDITIONAL TESTS:

## 2020-02-23 NOTE — PROGRESS NOTE ADULT - SUBJECTIVE AND OBJECTIVE BOX
Continue current management at this time. [facility] -- Raman Ceja, Deneen, Aryan, David Anton Savella  Office # [phone]      Follow Up:    afib     Subjective/Observations:   Seen at bedside, + cough non productive + shortness of breath on exertion       REVIEW OF SYSTEMS: All other review of systems is negative unless indicated above    PAST MEDICAL & SURGICAL HISTORY:  Coronary artery disease involving native coronary artery of native heart, angina presence unspecified  Persistent atrial fibrillation: wwas on eliquis, stopped due to gi bleed workup underway  Type 2 diabetes mellitus without complication, without long-term current use of insulin  Hyperlipemia  MI, old  Hypertension  Sepsis: sec to prostate bx  S/P coronary artery bypass graft x 3  Pacemaker: checked at pmd/cardio office 1 week ago  Presence of Watchman left atrial appendage closure device  H/O prostate biopsy  History of cholecystectomy  Artificial cardiac pacemaker  Stented coronary artery  H/O coronary angioplasty: 4 stents  S/P CABG x 3      MEDICATIONS  (STANDING):  albuterol/ipratropium for Nebulization. 3 milliLiter(s) Nebulizer every 6 hours  ascorbic acid 500 milliGRAM(s) Oral daily  aspirin  chewable 81 milliGRAM(s) Oral daily  carvedilol 25 milliGRAM(s) Oral every 12 hours  cefTRIAXone   IVPB 1000 milliGRAM(s) IV Intermittent every 24 hours  dextrose 5%. 1000 milliLiter(s) (50 mL/Hr) IV Continuous <Continuous>  dextrose 50% Injectable 12.5 Gram(s) IV Push once  dextrose 50% Injectable 25 Gram(s) IV Push once  dextrose 50% Injectable 25 Gram(s) IV Push once  ferrous    sulfate 325 milliGRAM(s) Oral daily  furosemide   Injectable 40 milliGRAM(s) IV Push every 12 hours  guaiFENesin  milliGRAM(s) Oral every 12 hours  insulin lispro (HumaLOG) corrective regimen sliding scale   SubCutaneous three times a day before meals  insulin lispro (HumaLOG) corrective regimen sliding scale   SubCutaneous at bedtime  montelukast 10 milliGRAM(s) Oral daily  multivitamin/minerals 1 Tablet(s) Oral daily  pantoprazole    Tablet 40 milliGRAM(s) Oral two times a day  predniSONE   Tablet 30 milliGRAM(s) Oral daily  sertraline 50 milliGRAM(s) Oral daily  sodium chloride 3%  Inhalation 3 milliLiter(s) Inhalation every 6 hours  tamsulosin 0.4 milliGRAM(s) Oral two times a day    MEDICATIONS  (PRN):  dextrose 40% Gel 15 Gram(s) Oral once PRN Blood Glucose LESS THAN 70 milliGRAM(s)/deciliter  glucagon  Injectable 1 milliGRAM(s) IntraMuscular once PRN Glucose LESS THAN 70 milligrams/deciliter  guaiFENesin   Syrup  (Sugar-Free) 200 milliGRAM(s) Oral every 6 hours PRN Cough      Allergies    No Known Allergies    Intolerances        Vital Signs Last 24 Hrs  T(C): 36.3 (23 Feb 2020 07:40), Max: 36.7 (22 Feb 2020 12:30)  T(F): 97.3 (23 Feb 2020 07:40), Max: 98.1 (22 Feb 2020 15:20)  HR: 60 (23 Feb 2020 07:40) (60 - 77)  BP: 150/60 (23 Feb 2020 07:40) (113/59 - 158/67)  BP(mean): --  RR: 20 (23 Feb 2020 07:40) (18 - 20)  SpO2: 100% (23 Feb 2020 07:40) (93% - 100%)    I&O's Summary    22 Feb 2020 07:01  -  23 Feb 2020 07:00  --------------------------------------------------------  IN: 300 mL / OUT: 3100 mL / NET: -2800 mL    23 Feb 2020 07:01  -  23 Feb 2020 09:57  --------------------------------------------------------  IN: 0 mL / OUT: 2900 mL / NET: -2900 mL          PHYSICAL EXAM:  TELE: not on tele   Constitutional: NAD, awake and alert, well-developed  HEENT: Moist Mucous Membranes, Anicteric  Pulmonary: Non-labored, diffuse expiratory wheeze with rhonchi   Cardiovascular: Regular, S1 and S2 nl, No murmurs, rubs, gallops or clicks  Gastrointestinal: Bowel Sounds present, soft, nontender.   Lymph: No lymphadenopathy. No peripheral edema.  Skin: No visible rashes or ulcers.  Psych:  Mood & affect appropriate    LABS: All Labs Reviewed:                        9.5    7.08  )-----------( 129      ( 23 Feb 2020 09:13 )             28.5                         9.4    10.26 )-----------( 133      ( 22 Feb 2020 06:16 )             28.9                         9.1    8.10  )-----------( 111      ( 21 Feb 2020 07:03 )             27.4     23 Feb 2020 09:13    139    |  100    |  60     ----------------------------<  200    3.7     |  32     |  1.50   22 Feb 2020 06:16    137    |  102    |  56     ----------------------------<  144    4.0     |  25     |  1.30   21 Feb 2020 07:03    138    |  105    |  54     ----------------------------<  179    4.8     |  24     |  1.60     Ca    9.3        23 Feb 2020 09:13  Ca    9.2        22 Feb 2020 06:16  Ca    9.1        21 Feb 2020 07:03  Mg     2.3       21 Feb 2020 07:03    TPro  7.4    /  Alb  3.5    /  TBili  0.4    /  DBili  x      /  AST  46     /  ALT  26     /  AlkPhos  51     21 Feb 2020 07:03             ECG:< from: 12 Lead ECG (02.20.20 @ 08:24) >  Ventricular Rate 83 BPM    Atrial Rate 85 BPM    P-R Interval 196 ms    QRS Duration 200 ms    Q-T Interval 476 ms    QTC Calculation(Bezet) 559 ms    R Axis -50 degrees    T Axis 120 degrees    Diagnosis Line AV dual-paced rhythm with frequent premature ventricular complexes  Abnormal ECG  When compared with ECG of 28-JUN-2013 21:25,  premature ventricular complexes are now present  Vent. rate has increased BY  23 BPM    < end of copied text >      Echo:  < from: [facility]LENE w/o TTE (w/3D Echo) (10.12.17 @ 07:55) >  1. Mitral annular calcification, otherwise normal mitral  valve. Mildmitral regurgitation.  2. Calcified trileaflet aortic valve with normal opening.  Mild aortic regurgitation.  3. Moderate aortic root dilatation  (Ao: 4.8 cm at the  sinuses of Valsalva).  Mild non-mobile atherosclerotic  plaque in the aortic arch and descending thoracic aorta.  4. Dilated left atrium.  A Watchman closure device is  present and is well seated at the ostium of the left atrial  appendage (NYA).  Colour Doppler interrogation demonstrates  a small amount of residual flow around the device.  The  width of the joel-device jet is about  2-3 mm by colour  Doppler interrogation.  The joel-device flow occurs on the  medial aspect of the device.  5. Endocardium not well visualized; grossly normal left  ventricular systolic function.  6.Normal right ventricular size and function.  A device  wire is noted in the right heart.  7. Contrast injection demonstrates no evidence of a patent  foramen ovale.    < end of copied text >      Radiology:

## 2020-02-24 LAB
ANION GAP SERPL CALC-SCNC: 11 MMOL/L — SIGNIFICANT CHANGE UP (ref 5–17)
BUN SERPL-MCNC: 65 MG/DL — HIGH (ref 7–23)
CALCIUM SERPL-MCNC: 9.5 MG/DL — SIGNIFICANT CHANGE UP (ref 8.5–10.1)
CHLORIDE SERPL-SCNC: 100 MMOL/L — SIGNIFICANT CHANGE UP (ref 96–108)
CO2 SERPL-SCNC: 29 MMOL/L — SIGNIFICANT CHANGE UP (ref 22–31)
CREAT SERPL-MCNC: 1.4 MG/DL — HIGH (ref 0.5–1.3)
GLUCOSE SERPL-MCNC: 142 MG/DL — HIGH (ref 70–99)
HCT VFR BLD CALC: 32.5 % — LOW (ref 39–50)
HGB BLD-MCNC: 10.6 G/DL — LOW (ref 13–17)
MCHC RBC-ENTMCNC: 29.6 PG — SIGNIFICANT CHANGE UP (ref 27–34)
MCHC RBC-ENTMCNC: 32.6 GM/DL — SIGNIFICANT CHANGE UP (ref 32–36)
MCV RBC AUTO: 90.8 FL — SIGNIFICANT CHANGE UP (ref 80–100)
NRBC # BLD: 0 /100 WBCS — SIGNIFICANT CHANGE UP (ref 0–0)
PLATELET # BLD AUTO: 181 K/UL — SIGNIFICANT CHANGE UP (ref 150–400)
POTASSIUM SERPL-MCNC: 3.5 MMOL/L — SIGNIFICANT CHANGE UP (ref 3.5–5.3)
POTASSIUM SERPL-SCNC: 3.5 MMOL/L — SIGNIFICANT CHANGE UP (ref 3.5–5.3)
RBC # BLD: 3.58 M/UL — LOW (ref 4.2–5.8)
RBC # FLD: 14.1 % — SIGNIFICANT CHANGE UP (ref 10.3–14.5)
SODIUM SERPL-SCNC: 140 MMOL/L — SIGNIFICANT CHANGE UP (ref 135–145)
WBC # BLD: 8.61 K/UL — SIGNIFICANT CHANGE UP (ref 3.8–10.5)
WBC # FLD AUTO: 8.61 K/UL — SIGNIFICANT CHANGE UP (ref 3.8–10.5)

## 2020-02-24 PROCEDURE — 99232 SBSQ HOSP IP/OBS MODERATE 35: CPT

## 2020-02-24 RX ORDER — CEFUROXIME AXETIL 250 MG
500 TABLET ORAL EVERY 12 HOURS
Refills: 0 | Status: DISCONTINUED | OUTPATIENT
Start: 2020-02-25 | End: 2020-02-26

## 2020-02-24 RX ADMIN — TAMSULOSIN HYDROCHLORIDE 0.4 MILLIGRAM(S): 0.4 CAPSULE ORAL at 17:37

## 2020-02-24 RX ADMIN — Medication 1: at 08:10

## 2020-02-24 RX ADMIN — Medication 40 MILLIGRAM(S): at 17:37

## 2020-02-24 RX ADMIN — SERTRALINE 50 MILLIGRAM(S): 25 TABLET, FILM COATED ORAL at 11:23

## 2020-02-24 RX ADMIN — SODIUM CHLORIDE 3 MILLILITER(S): 9 INJECTION INTRAMUSCULAR; INTRAVENOUS; SUBCUTANEOUS at 07:12

## 2020-02-24 RX ADMIN — SODIUM CHLORIDE 3 MILLILITER(S): 9 INJECTION INTRAMUSCULAR; INTRAVENOUS; SUBCUTANEOUS at 20:21

## 2020-02-24 RX ADMIN — PANTOPRAZOLE SODIUM 40 MILLIGRAM(S): 20 TABLET, DELAYED RELEASE ORAL at 05:21

## 2020-02-24 RX ADMIN — Medication 3: at 13:58

## 2020-02-24 RX ADMIN — Medication 600 MILLIGRAM(S): at 17:37

## 2020-02-24 RX ADMIN — Medication 500 MILLIGRAM(S): at 11:23

## 2020-02-24 RX ADMIN — CARVEDILOL PHOSPHATE 25 MILLIGRAM(S): 80 CAPSULE, EXTENDED RELEASE ORAL at 05:21

## 2020-02-24 RX ADMIN — SODIUM CHLORIDE 3 MILLILITER(S): 9 INJECTION INTRAMUSCULAR; INTRAVENOUS; SUBCUTANEOUS at 00:31

## 2020-02-24 RX ADMIN — Medication 3 MILLILITER(S): at 13:58

## 2020-02-24 RX ADMIN — MONTELUKAST 10 MILLIGRAM(S): 4 TABLET, CHEWABLE ORAL at 11:23

## 2020-02-24 RX ADMIN — Medication 40 MILLIGRAM(S): at 05:21

## 2020-02-24 RX ADMIN — Medication 1 TABLET(S): at 11:23

## 2020-02-24 RX ADMIN — CARVEDILOL PHOSPHATE 25 MILLIGRAM(S): 80 CAPSULE, EXTENDED RELEASE ORAL at 17:37

## 2020-02-24 RX ADMIN — Medication 2: at 17:38

## 2020-02-24 RX ADMIN — Medication 325 MILLIGRAM(S): at 11:23

## 2020-02-24 RX ADMIN — CEFTRIAXONE 100 MILLIGRAM(S): 500 INJECTION, POWDER, FOR SOLUTION INTRAMUSCULAR; INTRAVENOUS at 11:09

## 2020-02-24 RX ADMIN — Medication 3 MILLILITER(S): at 00:32

## 2020-02-24 RX ADMIN — Medication 3 MILLILITER(S): at 07:12

## 2020-02-24 RX ADMIN — Medication 600 MILLIGRAM(S): at 05:21

## 2020-02-24 RX ADMIN — SODIUM CHLORIDE 3 MILLILITER(S): 9 INJECTION INTRAMUSCULAR; INTRAVENOUS; SUBCUTANEOUS at 13:58

## 2020-02-24 RX ADMIN — TAMSULOSIN HYDROCHLORIDE 0.4 MILLIGRAM(S): 0.4 CAPSULE ORAL at 05:21

## 2020-02-24 RX ADMIN — Medication 3 MILLILITER(S): at 20:21

## 2020-02-24 RX ADMIN — PANTOPRAZOLE SODIUM 40 MILLIGRAM(S): 20 TABLET, DELAYED RELEASE ORAL at 17:37

## 2020-02-24 RX ADMIN — Medication 30 MILLIGRAM(S): at 05:21

## 2020-02-24 RX ADMIN — Medication 81 MILLIGRAM(S): at 13:57

## 2020-02-24 NOTE — PROGRESS NOTE ADULT - SUBJECTIVE AND OBJECTIVE BOX
Patient is a 88y old  Male who presents with a chief complaint of shortness of breath (24 Feb 2020 12:20)      INTERVAL HPI/OVERNIGHT EVENTS: Patient seen and examined at bedside. No overnight events occurred. Patient has no complaints at this time. Denies fevers, chills, headache, lightheadedness, chest pain, dyspnea, abdominal pain, n/v/d/c. Meds are now switched to PO in preparation of discharge.     MEDICATIONS  (STANDING):  albuterol/ipratropium for Nebulization. 3 milliLiter(s) Nebulizer every 6 hours  ascorbic acid 500 milliGRAM(s) Oral daily  aspirin  chewable 81 milliGRAM(s) Oral daily  carvedilol 25 milliGRAM(s) Oral every 12 hours  dextrose 5%. 1000 milliLiter(s) (50 mL/Hr) IV Continuous <Continuous>  dextrose 50% Injectable 12.5 Gram(s) IV Push once  dextrose 50% Injectable 25 Gram(s) IV Push once  dextrose 50% Injectable 25 Gram(s) IV Push once  ferrous    sulfate 325 milliGRAM(s) Oral daily  furosemide   Injectable 40 milliGRAM(s) IV Push every 12 hours  guaiFENesin  milliGRAM(s) Oral every 12 hours  insulin lispro (HumaLOG) corrective regimen sliding scale   SubCutaneous three times a day before meals  insulin lispro (HumaLOG) corrective regimen sliding scale   SubCutaneous at bedtime  montelukast 10 milliGRAM(s) Oral daily  multivitamin/minerals 1 Tablet(s) Oral daily  pantoprazole    Tablet 40 milliGRAM(s) Oral two times a day  predniSONE   Tablet 20 milliGRAM(s) Oral daily  sertraline 50 milliGRAM(s) Oral daily  sodium chloride 3%  Inhalation 3 milliLiter(s) Inhalation every 6 hours  tamsulosin 0.4 milliGRAM(s) Oral two times a day    MEDICATIONS  (PRN):  dextrose 40% Gel 15 Gram(s) Oral once PRN Blood Glucose LESS THAN 70 milliGRAM(s)/deciliter  glucagon  Injectable 1 milliGRAM(s) IntraMuscular once PRN Glucose LESS THAN 70 milligrams/deciliter      Allergies    No Known Allergies    Intolerances        REVIEW OF SYSTEMS:  CONSTITUTIONAL: No fever or chills  HEENT:  No headache, no sore throat  RESPIRATORY: Admits occasional productive cough, wheezing, or shortness of breath  CARDIOVASCULAR: No chest pain, palpitations  GASTROINTESTINAL: No abd pain, nausea, vomiting, or diarrhea  GENITOURINARY: No dysuria, frequency, or hematuria  NEUROLOGICAL: no focal weakness or dizziness  MUSCULOSKELETAL: no myalgias     Vital Signs Last 24 Hrs  T(C): 36.3 (24 Feb 2020 16:03), Max: 36.8 (23 Feb 2020 19:48)  T(F): 97.4 (24 Feb 2020 16:03), Max: 98.2 (23 Feb 2020 19:48)  HR: 69 (24 Feb 2020 16:03) (69 - 76)  BP: 159/78 (24 Feb 2020 16:03) (126/70 - 160/74)  BP(mean): --  RR: 17 (24 Feb 2020 16:03) (17 - 96)  SpO2: 95% (24 Feb 2020 16:03) (93% - 99%)    PHYSICAL EXAM:  GENERAL: NAD, resting comfortably in a chair.   HEENT:  anicteric, moist mucous membranes, EOMI  CHEST/LUNG:  CTA b/l, no rales, or rhonchi  HEART:  RRR, S1, S2  ABDOMEN:  BS+, soft, nontender, nondistended  EXTREMITIES: no edema, cyanosis, or calf tenderness  NERVOUS SYSTEM: answers questions and follows commands appropriately    LABS:                        10.6   8.61  )-----------( 181      ( 24 Feb 2020 06:38 )             32.5     CBC Full  -  ( 24 Feb 2020 06:38 )  WBC Count : 8.61 K/uL  Hemoglobin : 10.6 g/dL  Hematocrit : 32.5 %  Platelet Count - Automated : 181 K/uL  Mean Cell Volume : 90.8 fl  Mean Cell Hemoglobin : 29.6 pg  Mean Cell Hemoglobin Concentration : 32.6 gm/dL  Auto Neutrophil # : x  Auto Lymphocyte # : x  Auto Monocyte # : x  Auto Eosinophil # : x  Auto Basophil # : x  Auto Neutrophil % : x  Auto Lymphocyte % : x  Auto Monocyte % : x  Auto Eosinophil % : x  Auto Basophil % : x    24 Feb 2020 06:38    140    |  100    |  65     ----------------------------<  142    3.5     |  29     |  1.40     Ca    9.5        24 Feb 2020 06:38          CAPILLARY BLOOD GLUCOSE      POCT Blood Glucose.: 260 mg/dL (24 Feb 2020 13:56)  POCT Blood Glucose.: 256 mg/dL (24 Feb 2020 13:36)  POCT Blood Glucose.: 255 mg/dL (24 Feb 2020 11:51)  POCT Blood Glucose.: 169 mg/dL (24 Feb 2020 07:55)  POCT Blood Glucose.: 232 mg/dL (23 Feb 2020 21:18)        Culture - Blood (collected 02-20-20 @ 12:34)  Source: .Blood Blood-Peripheral  Preliminary Report (02-21-20 @ 13:01):    No growth to date.    Culture - Blood (collected 02-20-20 @ 12:34)  Source: .Blood Blood-Peripheral  Preliminary Report (02-21-20 @ 13:01):    No growth to date.        RADIOLOGY & ADDITIONAL TESTS:    Personally reviewed.     Consultant(s) Notes Reviewed:  [x] YES  [ ] NO Patient is a 88y old  Male who presents with a chief complaint of shortness of breath (24 Feb 2020 12:20)      INTERVAL HPI/OVERNIGHT EVENTS: Patient seen and examined at bedside. No overnight events occurred. Patient has no complaints at this time. Denies fevers, chills, headache, lightheadedness, chest pain, dyspnea, abdominal pain, n/v/d/c. Meds are now switched to PO in preparation of discharge.     MEDICATIONS  (STANDING):  albuterol/ipratropium for Nebulization. 3 milliLiter(s) Nebulizer every 6 hours  ascorbic acid 500 milliGRAM(s) Oral daily  aspirin  chewable 81 milliGRAM(s) Oral daily  carvedilol 25 milliGRAM(s) Oral every 12 hours  dextrose 5%. 1000 milliLiter(s) (50 mL/Hr) IV Continuous <Continuous>  dextrose 50% Injectable 12.5 Gram(s) IV Push once  dextrose 50% Injectable 25 Gram(s) IV Push once  dextrose 50% Injectable 25 Gram(s) IV Push once  ferrous    sulfate 325 milliGRAM(s) Oral daily  furosemide   Injectable 40 milliGRAM(s) IV Push every 12 hours  guaiFENesin  milliGRAM(s) Oral every 12 hours  insulin lispro (HumaLOG) corrective regimen sliding scale   SubCutaneous three times a day before meals  insulin lispro (HumaLOG) corrective regimen sliding scale   SubCutaneous at bedtime  montelukast 10 milliGRAM(s) Oral daily  multivitamin/minerals 1 Tablet(s) Oral daily  pantoprazole    Tablet 40 milliGRAM(s) Oral two times a day  predniSONE   Tablet 20 milliGRAM(s) Oral daily  sertraline 50 milliGRAM(s) Oral daily  sodium chloride 3%  Inhalation 3 milliLiter(s) Inhalation every 6 hours  tamsulosin 0.4 milliGRAM(s) Oral two times a day    MEDICATIONS  (PRN):  dextrose 40% Gel 15 Gram(s) Oral once PRN Blood Glucose LESS THAN 70 milliGRAM(s)/deciliter  glucagon  Injectable 1 milliGRAM(s) IntraMuscular once PRN Glucose LESS THAN 70 milligrams/deciliter      Allergies    No Known Allergies    Intolerances        REVIEW OF SYSTEMS:  CONSTITUTIONAL: No fever or chills  HEENT:  No headache, no sore throat  RESPIRATORY: Admits occasional productive cough, wheezing, or shortness of breath  CARDIOVASCULAR: No chest pain, palpitations  GASTROINTESTINAL: No abd pain, nausea, vomiting, or diarrhea  GENITOURINARY: No dysuria, frequency, or hematuria  NEUROLOGICAL: no focal weakness or dizziness  MUSCULOSKELETAL: no myalgias     Vital Signs Last 24 Hrs  T(C): 36.3 (24 Feb 2020 16:03), Max: 36.8 (23 Feb 2020 19:48)  T(F): 97.4 (24 Feb 2020 16:03), Max: 98.2 (23 Feb 2020 19:48)  HR: 69 (24 Feb 2020 16:03) (69 - 76)  BP: 159/78 (24 Feb 2020 16:03) (126/70 - 160/74)  BP(mean): --  RR: 17 (24 Feb 2020 16:03) (17 - 96)  SpO2: 95% (24 Feb 2020 16:03) (93% - 99%)    PHYSICAL EXAM:  GENERAL: NAD, resting comfortably in a chair.   HEENT:  anicteric, moist mucous membranes, EOMI  CHEST/LUNG:  expiratory wheeze b/l, no rales, or rhonchi  HEART:  RRR, S1, S2  ABDOMEN:  BS+, soft, nontender, nondistended  EXTREMITIES: no edema, cyanosis, or calf tenderness  NERVOUS SYSTEM: answers questions and follows commands appropriately    LABS:                        10.6   8.61  )-----------( 181      ( 24 Feb 2020 06:38 )             32.5     CBC Full  -  ( 24 Feb 2020 06:38 )  WBC Count : 8.61 K/uL  Hemoglobin : 10.6 g/dL  Hematocrit : 32.5 %  Platelet Count - Automated : 181 K/uL  Mean Cell Volume : 90.8 fl  Mean Cell Hemoglobin : 29.6 pg  Mean Cell Hemoglobin Concentration : 32.6 gm/dL  Auto Neutrophil # : x  Auto Lymphocyte # : x  Auto Monocyte # : x  Auto Eosinophil # : x  Auto Basophil # : x  Auto Neutrophil % : x  Auto Lymphocyte % : x  Auto Monocyte % : x  Auto Eosinophil % : x  Auto Basophil % : x    24 Feb 2020 06:38    140    |  100    |  65     ----------------------------<  142    3.5     |  29     |  1.40     Ca    9.5        24 Feb 2020 06:38          CAPILLARY BLOOD GLUCOSE      POCT Blood Glucose.: 260 mg/dL (24 Feb 2020 13:56)  POCT Blood Glucose.: 256 mg/dL (24 Feb 2020 13:36)  POCT Blood Glucose.: 255 mg/dL (24 Feb 2020 11:51)  POCT Blood Glucose.: 169 mg/dL (24 Feb 2020 07:55)  POCT Blood Glucose.: 232 mg/dL (23 Feb 2020 21:18)        Culture - Blood (collected 02-20-20 @ 12:34)  Source: .Blood Blood-Peripheral  Preliminary Report (02-21-20 @ 13:01):    No growth to date.    Culture - Blood (collected 02-20-20 @ 12:34)  Source: .Blood Blood-Peripheral  Preliminary Report (02-21-20 @ 13:01):    No growth to date.        RADIOLOGY & ADDITIONAL TESTS:    Personally reviewed.     Consultant(s) Notes Reviewed:  [x] YES  [ ] NO

## 2020-02-24 NOTE — PROGRESS NOTE ADULT - SUBJECTIVE AND OBJECTIVE BOX
Date/Time Patient Seen:  		  Referring MD:   Data Reviewed	       Patient is a 88y old  Male who presents with a chief complaint of shortness of breath (23 Feb 2020 16:57)      Subjective/HPI     PAST MEDICAL & SURGICAL HISTORY:  Coronary artery disease involving native coronary artery of native heart, angina presence unspecified  Persistent atrial fibrillation: wwas on eliquis, stopped due to gi bleed workup underway  Type 2 diabetes mellitus without complication, without long-term current use of insulin  Hyperlipemia  MI, old  Hypertension  Sepsis: sec to prostate bx  S/P coronary artery bypass graft x 3  Pacemaker: checked at pmd/cardio office 1 week ago  Presence of Watchman left atrial appendage closure device  H/O prostate biopsy  History of cholecystectomy  Artificial cardiac pacemaker  Stented coronary artery  H/O coronary angioplasty: 4 stents  S/P CABG x 3  No significant past surgical history        Medication list         MEDICATIONS  (STANDING):  albuterol/ipratropium for Nebulization. 3 milliLiter(s) Nebulizer every 6 hours  ascorbic acid 500 milliGRAM(s) Oral daily  aspirin  chewable 81 milliGRAM(s) Oral daily  carvedilol 25 milliGRAM(s) Oral every 12 hours  cefTRIAXone   IVPB 1000 milliGRAM(s) IV Intermittent every 24 hours  dextrose 5%. 1000 milliLiter(s) (50 mL/Hr) IV Continuous <Continuous>  dextrose 50% Injectable 12.5 Gram(s) IV Push once  dextrose 50% Injectable 25 Gram(s) IV Push once  dextrose 50% Injectable 25 Gram(s) IV Push once  ferrous    sulfate 325 milliGRAM(s) Oral daily  furosemide   Injectable 40 milliGRAM(s) IV Push every 12 hours  guaiFENesin  milliGRAM(s) Oral every 12 hours  insulin lispro (HumaLOG) corrective regimen sliding scale   SubCutaneous three times a day before meals  insulin lispro (HumaLOG) corrective regimen sliding scale   SubCutaneous at bedtime  montelukast 10 milliGRAM(s) Oral daily  multivitamin/minerals 1 Tablet(s) Oral daily  pantoprazole    Tablet 40 milliGRAM(s) Oral two times a day  predniSONE   Tablet 30 milliGRAM(s) Oral daily  sertraline 50 milliGRAM(s) Oral daily  sodium chloride 3%  Inhalation 3 milliLiter(s) Inhalation every 6 hours  tamsulosin 0.4 milliGRAM(s) Oral two times a day    MEDICATIONS  (PRN):  dextrose 40% Gel 15 Gram(s) Oral once PRN Blood Glucose LESS THAN 70 milliGRAM(s)/deciliter  glucagon  Injectable 1 milliGRAM(s) IntraMuscular once PRN Glucose LESS THAN 70 milligrams/deciliter  guaiFENesin   Syrup  (Sugar-Free) 200 milliGRAM(s) Oral every 6 hours PRN Cough         Vitals log        ICU Vital Signs Last 24 Hrs  T(C): 36.6 (24 Feb 2020 07:17), Max: 36.8 (23 Feb 2020 19:48)  T(F): 97.8 (24 Feb 2020 07:17), Max: 98.2 (23 Feb 2020 19:48)  HR: 76 (24 Feb 2020 07:17) (60 - 76)  BP: 126/70 (24 Feb 2020 07:17) (121/62 - 150/60)  BP(mean): --  ABP: --  ABP(mean): --  RR: 18 (24 Feb 2020 07:17) (18 - 20)  SpO2: 98% (24 Feb 2020 07:17) (93% - 100%)           Input and Output:  I&O's Detail    23 Feb 2020 07:01  -  24 Feb 2020 07:00  --------------------------------------------------------  IN:  Total IN: 0 mL    OUT:    Voided: 3700 mL  Total OUT: 3700 mL    Total NET: -3700 mL          Lab Data                        10.6   8.61  )-----------( 181      ( 24 Feb 2020 06:38 )             32.5     02-24    140  |  100  |  65<H>  ----------------------------<  142<H>  3.5   |  29  |  1.40<H>    Ca    9.5      24 Feb 2020 06:38              Review of Systems	      Objective     Physical Examination    heart s1s2  lung dec BS  abd soft  head nc  on o2 support - NC  head at  verbal      Pertinent Lab findings & Imaging      Sarah:  NO   Adequate UO     I&O's Detail    23 Feb 2020 07:01  -  24 Feb 2020 07:00  --------------------------------------------------------  IN:  Total IN: 0 mL    OUT:    Voided: 3700 mL  Total OUT: 3700 mL    Total NET: -3700 mL               Discussed with:     Cultures:	        Radiology

## 2020-02-24 NOTE — PROGRESS NOTE ADULT - SUBJECTIVE AND OBJECTIVE BOX
[facility] -- Raman Ceja, Deneen, Aryan, David Anton Savella  Office # [phone]      Follow Up:  afib, cabg, pci    Subjective/Observations:   No events overnight resting comfortably in bed.  No complaints of chest pain, dyspnea, or palpitations reported. No signs of orthopnea or PND.  + non productive cough       REVIEW OF SYSTEMS: All other review of systems is negative unless indicated above    PAST MEDICAL & SURGICAL HISTORY:  Coronary artery disease involving native coronary artery of native heart, angina presence unspecified  Persistent atrial fibrillation: wwas on eliquis, stopped due to gi bleed workup underway  Type 2 diabetes mellitus without complication, without long-term current use of insulin  Hyperlipemia  MI, old  Hypertension  Sepsis: sec to prostate bx  S/P coronary artery bypass graft x 3  Pacemaker: checked at pmd/cardio office 1 week ago  Presence of Watchman left atrial appendage closure device  H/O prostate biopsy  History of cholecystectomy  Artificial cardiac pacemaker  Stented coronary artery  H/O coronary angioplasty: 4 stents  S/P CABG x 3      MEDICATIONS  (STANDING):  albuterol/ipratropium for Nebulization. 3 milliLiter(s) Nebulizer every 6 hours  ascorbic acid 500 milliGRAM(s) Oral daily  aspirin  chewable 81 milliGRAM(s) Oral daily  carvedilol 25 milliGRAM(s) Oral every 12 hours  cefTRIAXone   IVPB 1000 milliGRAM(s) IV Intermittent every 24 hours  dextrose 5%. 1000 milliLiter(s) (50 mL/Hr) IV Continuous <Continuous>  dextrose 50% Injectable 12.5 Gram(s) IV Push once  dextrose 50% Injectable 25 Gram(s) IV Push once  dextrose 50% Injectable 25 Gram(s) IV Push once  ferrous    sulfate 325 milliGRAM(s) Oral daily  furosemide   Injectable 40 milliGRAM(s) IV Push every 12 hours  guaiFENesin  milliGRAM(s) Oral every 12 hours  insulin lispro (HumaLOG) corrective regimen sliding scale   SubCutaneous three times a day before meals  insulin lispro (HumaLOG) corrective regimen sliding scale   SubCutaneous at bedtime  montelukast 10 milliGRAM(s) Oral daily  multivitamin/minerals 1 Tablet(s) Oral daily  pantoprazole    Tablet 40 milliGRAM(s) Oral two times a day  predniSONE   Tablet 20 milliGRAM(s) Oral daily  sertraline 50 milliGRAM(s) Oral daily  sodium chloride 3%  Inhalation 3 milliLiter(s) Inhalation every 6 hours  tamsulosin 0.4 milliGRAM(s) Oral two times a day    MEDICATIONS  (PRN):  dextrose 40% Gel 15 Gram(s) Oral once PRN Blood Glucose LESS THAN 70 milliGRAM(s)/deciliter  glucagon  Injectable 1 milliGRAM(s) IntraMuscular once PRN Glucose LESS THAN 70 milligrams/deciliter  guaiFENesin   Syrup  (Sugar-Free) 200 milliGRAM(s) Oral every 6 hours PRN Cough      Allergies    No Known Allergies    Intolerances        Vital Signs Last 24 Hrs  T(C): 36.6 (24 Feb 2020 07:17), Max: 36.8 (23 Feb 2020 19:48)  T(F): 97.8 (24 Feb 2020 07:17), Max: 98.2 (23 Feb 2020 19:48)  HR: 76 (24 Feb 2020 07:17) (68 - 76)  BP: 126/70 (24 Feb 2020 07:17) (121/62 - 138/71)  BP(mean): --  RR: 18 (24 Feb 2020 07:17) (18 - 20)  SpO2: 98% (24 Feb 2020 07:17) (93% - 99%)    I&O's Summary    23 Feb 2020 07:01  -  24 Feb 2020 07:00  --------------------------------------------------------  IN: 0 mL / OUT: 3700 mL / NET: -3700 mL          PHYSICAL EXAM:  TELE: not on tele   Constitutional: NAD, awake and alert, well-developed  HEENT: Moist Mucous Membranes, Anicteric  Pulmonary: Non-labored, diffuse rhonchi with expiratory wheeze  Cardiovascular: Regular, S1 and S2 nl, No murmurs, rubs, gallops or clicks  Gastrointestinal: Bowel Sounds present, soft, nontender.   Lymph: No lymphadenopathy. No peripheral edema.  Skin: No visible rashes or ulcers.  Psych:  Mood & affect appropriate    LABS: All Labs Reviewed:                        10.6   8.61  )-----------( 181      ( 24 Feb 2020 06:38 )             32.5                         9.5    7.08  )-----------( 129      ( 23 Feb 2020 09:13 )             28.5                         9.4    10.26 )-----------( 133      ( 22 Feb 2020 06:16 )             28.9     24 Feb 2020 06:38    140    |  100    |  65     ----------------------------<  142    3.5     |  29     |  1.40   23 Feb 2020 09:13    139    |  100    |  60     ----------------------------<  200    3.7     |  32     |  1.50   22 Feb 2020 06:16    137    |  102    |  56     ----------------------------<  144    4.0     |  25     |  1.30     Ca    9.5        24 Feb 2020 06:38  Ca    9.3        23 Feb 2020 09:13  Ca    9.2        22 Feb 2020 06:16               ECG:  < from: 12 Lead ECG (02.20.20 @ 08:24) >    Atrial Rate 85 BPM    P-R Interval 196 ms    QRS Duration 200 ms    Q-T Interval 476 ms    QTC Calculation(Bezet) 559 ms    R Axis -50 degrees    T Axis 120 degrees    Diagnosis Line AV dual-paced rhythm with frequent premature ventricular complexes  Abnormal ECG  When compared with ECG of 28-JUN-2013 21:25,  premature ventricular complexes are now present  Vent. rate has increased BY  23 BPM    < end of copied text >      Echo:  < from: MARLENE w/o TTE (w/3D Echo) (10.12.17 @ 07:55) >  1. Mitral annular calcification, otherwise normal mitral  valve. Mildmitral regurgitation.  2. Calcified trileaflet aortic valve with normal opening.  Mild aortic regurgitation.  3. Moderate aortic root dilatation  (Ao: 4.8 cm at the  sinuses of Valsalva).  Mild non-mobile atherosclerotic  plaque in the aortic arch and descending thoracic aorta.  4. Dilated left atrium.  A Watchman closure device is  present and is well seated at the ostium of the left atrial  appendage (NYA).  Colour Doppler interrogation demonstrates  a small amount of residual flow around the device.  The  width of the joel-device jet is about  2-3 mm by colour  Doppler interrogation.  The joel-device flow occurs on the  medial aspect of the device.  5. Endocardium not well visualized; grossly normal left  ventricular systolic function.  6.Normal right ventricular size and function.  A device  wire is noted in the right heart.  7. Contrast injection demonstrates no evidence of a patent  foramen ovale.  -----------------------------------    < end of copied text >      Radiology:

## 2020-02-24 NOTE — PROGRESS NOTE ADULT - ASSESSMENT
87 yo male PMH of Afib (s/p Watchman procedure 2017 on Xarelto currently being held as of 2 weeks for blood in stool), remote CABG, PCIX4 last one being 2016, Pacemaker, HTN, HLD, DM2 presenting for cough and SOB.    SOB  - SOB likely multifactorial given PNA/CHF/anemia  - CT chest with pulmonary congestion and PNA; Pro-BNP=>4500  -Rhonchi with diffuse expiratory wheeze on exam -Continue with lasix BID , negative 3700 cc 24 hours   - Please continue to maintain strict I/Os, monitor daily weights, Cr, and K.   - Follow up TTE  - Continue with steroids, abx, nebs as per pulm and primary team   - Continue to encourgae incentive spirometer.      Afib s/p Watchman device (2017); PPM  - Continue to hold Xarelto for anemia- unclear why patient was still on xarelto given he is sp watchman procedure - follows with Dr Gonzalez   - Anemia work up as per primary team   - Monitor electrolytes, replete to keep K>4 and Mag>2  - Continue Coreg    CAD s/p CABG  - CAD appears to be stable.   - EKG without sig ischemic changes  - Continue ASA  - Unsure why not on statin - start if not contraindicated   - Continue Coreg  - Not on ACEI/ARB due to GILBERT    GILBERT  - Monitor renal indices.  Creatinine normalized 1.4  - Avoid nephrotoxics  - Renal following    GIB  - Follow FOBT  - Continue to trend Hgb    Further cardiac workup will depend on clinical course.   All other workup per primary team. Will followup.    Aziza Elizabeth FNP-C  Cardiology NP  [id number] [phone]

## 2020-02-24 NOTE — PROGRESS NOTE ADULT - SUBJECTIVE AND OBJECTIVE BOX
Patient is a 88y old  Male who presents with a chief complaint of shortness of breath (24 Feb 2020 09:23)      Patient seen in follow up for CKD 3.     PAST MEDICAL HISTORY:  Coronary artery disease involving native coronary artery of native heart, angina presence unspecified  Persistent atrial fibrillation  Type 2 diabetes mellitus without complication, without long-term current use of insulin  Hyperlipemia  MI, old  Hypertension  Sepsis  S/P coronary artery bypass graft x 3  Pacemaker    MEDICATIONS  (STANDING):  albuterol/ipratropium for Nebulization. 3 milliLiter(s) Nebulizer every 6 hours  ascorbic acid 500 milliGRAM(s) Oral daily  aspirin  chewable 81 milliGRAM(s) Oral daily  carvedilol 25 milliGRAM(s) Oral every 12 hours  cefTRIAXone   IVPB 1000 milliGRAM(s) IV Intermittent every 24 hours  dextrose 5%. 1000 milliLiter(s) (50 mL/Hr) IV Continuous <Continuous>  dextrose 50% Injectable 12.5 Gram(s) IV Push once  dextrose 50% Injectable 25 Gram(s) IV Push once  dextrose 50% Injectable 25 Gram(s) IV Push once  ferrous    sulfate 325 milliGRAM(s) Oral daily  furosemide   Injectable 40 milliGRAM(s) IV Push every 12 hours  guaiFENesin  milliGRAM(s) Oral every 12 hours  insulin lispro (HumaLOG) corrective regimen sliding scale   SubCutaneous three times a day before meals  insulin lispro (HumaLOG) corrective regimen sliding scale   SubCutaneous at bedtime  montelukast 10 milliGRAM(s) Oral daily  multivitamin/minerals 1 Tablet(s) Oral daily  pantoprazole    Tablet 40 milliGRAM(s) Oral two times a day  predniSONE   Tablet 20 milliGRAM(s) Oral daily  sertraline 50 milliGRAM(s) Oral daily  sodium chloride 3%  Inhalation 3 milliLiter(s) Inhalation every 6 hours  tamsulosin 0.4 milliGRAM(s) Oral two times a day    MEDICATIONS  (PRN):  dextrose 40% Gel 15 Gram(s) Oral once PRN Blood Glucose LESS THAN 70 milliGRAM(s)/deciliter  glucagon  Injectable 1 milliGRAM(s) IntraMuscular once PRN Glucose LESS THAN 70 milligrams/deciliter  guaiFENesin   Syrup  (Sugar-Free) 200 milliGRAM(s) Oral every 6 hours PRN Cough    T(C): 36.3 (02-24-20 @ 11:30), Max: 36.8 (02-23-20 @ 19:48)  HR: 72 (02-24-20 @ 11:30) (60 - 77)  BP: 160/74 (02-24-20 @ 11:30) (121/62 - 160/74)  RR: 96 (02-24-20 @ 11:30) (18 - 96)  SpO2: 95% (02-24-20 @ 11:05) (93% - 100%)  Wt(kg): --  I&O's Detail    23 Feb 2020 07:01  -  24 Feb 2020 07:00  --------------------------------------------------------  IN:  Total IN: 0 mL    OUT:    Voided: 3700 mL  Total OUT: 3700 mL    Total NET: -3700 mL      24 Feb 2020 07:01  -  24 Feb 2020 12:20  --------------------------------------------------------  IN:  Total IN: 0 mL    OUT:    Voided: 500 mL  Total OUT: 500 mL    Total NET: -500 mL          PHYSICAL EXAM:  General: NAD  Respiratory: b/l air entry  Cardiovascular: S1 S2  Gastrointestinal: soft  Extremities:  edema                          10.6   8.61  )-----------( 181      ( 24 Feb 2020 06:38 )             32.5     02-24    140  |  100  |  65<H>  ----------------------------<  142<H>  3.5   |  29  |  1.40<H>    Ca    9.5      24 Feb 2020 06:38        Sodium, Serum: 140 (02-24 @ 06:38)  Sodium, Serum: 139 (02-23 @ 09:13)  Sodium, Serum: 137 (02-22 @ 06:16)  Sodium, Serum: 138 (02-21 @ 07:03)    Creatinine, Serum: 1.40 (02-24 @ 06:38)  Creatinine, Serum: 1.50 (02-23 @ 09:13)  Creatinine, Serum: 1.30 (02-22 @ 06:16)  Creatinine, Serum: 1.60 (02-21 @ 07:03)    Potassium, Serum: 3.5 (02-24 @ 06:38)  Potassium, Serum: 3.7 (02-23 @ 09:13)  Potassium, Serum: 4.0 (02-22 @ 06:16)  Potassium, Serum: 4.8 (02-21 @ 07:03)    Hemoglobin: 10.6 (02-24 @ 06:38)  Hemoglobin: 9.5 (02-23 @ 09:13)  Hemoglobin: 9.4 (02-22 @ 06:16)  Hemoglobin: 9.1 (02-21 @ 07:03)

## 2020-02-24 NOTE — PHARMACOTHERAPY INTERVENTION NOTE - COMMENTS
Patient ordered for IV Ceftriaxone for CAP.  Patient received 5 doses IV and patient is improving.  Recommended to switch to oral Ceftin for 2 more days to complete 7 days therapy.  MD agreed.

## 2020-02-24 NOTE — PROGRESS NOTE ADULT - ASSESSMENT
88 white male with a history of HTN, CAD, CHF, CABG, AF, DM. PVD, CKD now admitted with a history of SOB, LEE and cough with congestion.     1.	CKD 3  2.	Hypertension  3.	Diabetes  4.	Dyspnea: CHF, Pneumonia    Stable renal indices. On IV lasix. Good diuresis. On IV abx. Titrate dose as tolerated. Monitor blood sugar levels. Insulin coverage as needed. Dietary restriction.   Monitor BP trend. Titrate BP meds as needed. Cardiology follow up. Avoid nephrotoxic meds as possible. Will follow electrolytes and renal function trend.

## 2020-02-25 ENCOUNTER — TRANSCRIPTION ENCOUNTER (OUTPATIENT)
Age: 85
End: 2020-02-25

## 2020-02-25 DIAGNOSIS — E87.6 HYPOKALEMIA: ICD-10-CM

## 2020-02-25 LAB
ANION GAP SERPL CALC-SCNC: 9 MMOL/L — SIGNIFICANT CHANGE UP (ref 5–17)
BUN SERPL-MCNC: 57 MG/DL — HIGH (ref 7–23)
CALCIUM SERPL-MCNC: 9.3 MG/DL — SIGNIFICANT CHANGE UP (ref 8.5–10.1)
CHLORIDE SERPL-SCNC: 100 MMOL/L — SIGNIFICANT CHANGE UP (ref 96–108)
CO2 SERPL-SCNC: 30 MMOL/L — SIGNIFICANT CHANGE UP (ref 22–31)
CREAT SERPL-MCNC: 1.4 MG/DL — HIGH (ref 0.5–1.3)
CULTURE RESULTS: SIGNIFICANT CHANGE UP
CULTURE RESULTS: SIGNIFICANT CHANGE UP
GLUCOSE SERPL-MCNC: 147 MG/DL — HIGH (ref 70–99)
HCT VFR BLD CALC: 29.2 % — LOW (ref 39–50)
HGB BLD-MCNC: 9.8 G/DL — LOW (ref 13–17)
MCHC RBC-ENTMCNC: 29.9 PG — SIGNIFICANT CHANGE UP (ref 27–34)
MCHC RBC-ENTMCNC: 33.6 GM/DL — SIGNIFICANT CHANGE UP (ref 32–36)
MCV RBC AUTO: 89 FL — SIGNIFICANT CHANGE UP (ref 80–100)
NRBC # BLD: 0 /100 WBCS — SIGNIFICANT CHANGE UP (ref 0–0)
PLATELET # BLD AUTO: 178 K/UL — SIGNIFICANT CHANGE UP (ref 150–400)
POTASSIUM SERPL-MCNC: 3.1 MMOL/L — LOW (ref 3.5–5.3)
POTASSIUM SERPL-SCNC: 3.1 MMOL/L — LOW (ref 3.5–5.3)
RBC # BLD: 3.28 M/UL — LOW (ref 4.2–5.8)
RBC # FLD: 13.9 % — SIGNIFICANT CHANGE UP (ref 10.3–14.5)
SODIUM SERPL-SCNC: 139 MMOL/L — SIGNIFICANT CHANGE UP (ref 135–145)
SPECIMEN SOURCE: SIGNIFICANT CHANGE UP
SPECIMEN SOURCE: SIGNIFICANT CHANGE UP
WBC # BLD: 7.12 K/UL — SIGNIFICANT CHANGE UP (ref 3.8–10.5)
WBC # FLD AUTO: 7.12 K/UL — SIGNIFICANT CHANGE UP (ref 3.8–10.5)

## 2020-02-25 PROCEDURE — 93306 TTE W/DOPPLER COMPLETE: CPT | Mod: 26

## 2020-02-25 PROCEDURE — 99232 SBSQ HOSP IP/OBS MODERATE 35: CPT

## 2020-02-25 RX ORDER — POTASSIUM CHLORIDE 20 MEQ
40 PACKET (EA) ORAL EVERY 4 HOURS
Refills: 0 | Status: DISCONTINUED | OUTPATIENT
Start: 2020-02-25 | End: 2020-02-25

## 2020-02-25 RX ORDER — POTASSIUM CHLORIDE 20 MEQ
40 PACKET (EA) ORAL EVERY 4 HOURS
Refills: 0 | Status: COMPLETED | OUTPATIENT
Start: 2020-02-25 | End: 2020-02-25

## 2020-02-25 RX ADMIN — PANTOPRAZOLE SODIUM 40 MILLIGRAM(S): 20 TABLET, DELAYED RELEASE ORAL at 17:52

## 2020-02-25 RX ADMIN — Medication 325 MILLIGRAM(S): at 11:38

## 2020-02-25 RX ADMIN — Medication 1: at 17:20

## 2020-02-25 RX ADMIN — Medication 1 TABLET(S): at 11:38

## 2020-02-25 RX ADMIN — Medication 1: at 08:29

## 2020-02-25 RX ADMIN — PANTOPRAZOLE SODIUM 40 MILLIGRAM(S): 20 TABLET, DELAYED RELEASE ORAL at 05:50

## 2020-02-25 RX ADMIN — CARVEDILOL PHOSPHATE 25 MILLIGRAM(S): 80 CAPSULE, EXTENDED RELEASE ORAL at 05:51

## 2020-02-25 RX ADMIN — Medication 600 MILLIGRAM(S): at 05:50

## 2020-02-25 RX ADMIN — TAMSULOSIN HYDROCHLORIDE 0.4 MILLIGRAM(S): 0.4 CAPSULE ORAL at 05:50

## 2020-02-25 RX ADMIN — Medication 40 MILLIGRAM(S): at 05:51

## 2020-02-25 RX ADMIN — TAMSULOSIN HYDROCHLORIDE 0.4 MILLIGRAM(S): 0.4 CAPSULE ORAL at 17:52

## 2020-02-25 RX ADMIN — MONTELUKAST 10 MILLIGRAM(S): 4 TABLET, CHEWABLE ORAL at 11:38

## 2020-02-25 RX ADMIN — Medication 500 MILLIGRAM(S): at 11:38

## 2020-02-25 RX ADMIN — Medication 40 MILLIEQUIVALENT(S): at 08:29

## 2020-02-25 RX ADMIN — Medication 600 MILLIGRAM(S): at 17:52

## 2020-02-25 RX ADMIN — Medication 3 MILLILITER(S): at 01:18

## 2020-02-25 RX ADMIN — Medication 3: at 12:11

## 2020-02-25 RX ADMIN — Medication 500 MILLIGRAM(S): at 21:10

## 2020-02-25 RX ADMIN — SODIUM CHLORIDE 3 MILLILITER(S): 9 INJECTION INTRAMUSCULAR; INTRAVENOUS; SUBCUTANEOUS at 20:10

## 2020-02-25 RX ADMIN — Medication 500 MILLIGRAM(S): at 10:36

## 2020-02-25 RX ADMIN — CARVEDILOL PHOSPHATE 25 MILLIGRAM(S): 80 CAPSULE, EXTENDED RELEASE ORAL at 17:52

## 2020-02-25 RX ADMIN — SODIUM CHLORIDE 3 MILLILITER(S): 9 INJECTION INTRAMUSCULAR; INTRAVENOUS; SUBCUTANEOUS at 07:42

## 2020-02-25 RX ADMIN — Medication 40 MILLIEQUIVALENT(S): at 11:38

## 2020-02-25 RX ADMIN — Medication 3 MILLILITER(S): at 07:42

## 2020-02-25 RX ADMIN — SODIUM CHLORIDE 3 MILLILITER(S): 9 INJECTION INTRAMUSCULAR; INTRAVENOUS; SUBCUTANEOUS at 01:19

## 2020-02-25 RX ADMIN — Medication 40 MILLIGRAM(S): at 17:52

## 2020-02-25 RX ADMIN — Medication 20 MILLIGRAM(S): at 05:50

## 2020-02-25 RX ADMIN — Medication 81 MILLIGRAM(S): at 11:38

## 2020-02-25 RX ADMIN — SERTRALINE 50 MILLIGRAM(S): 25 TABLET, FILM COATED ORAL at 11:38

## 2020-02-25 RX ADMIN — Medication 3 MILLILITER(S): at 20:10

## 2020-02-25 NOTE — PROGRESS NOTE ADULT - PROBLEM SELECTOR PLAN 4
Patient noted to have elevated Cr. 1.7 in ED  - decreased to 1.4 today- possibly from lasix  - Nephrology consulted - GILBERT on CKD most likely due to ATN secondary to sepsis complicated by ARB and diuretics. Restarted diuretics as patient in fluid overload, monitor Cr  - Renal US - Bilaterally increased renal cortical echogenicity compatible with medical renal disease. No hydronephrosis.  - Will continue to monitor BMP
monitor i/os  Restarted lasix per cardio  Cardiology consulted, recs appreciated
monitor i/os  Restarted lasix per cardio  Cardiology consulted, recs appreciated
monitor i/os  hold off on diuretics unless clinically warranted as pt has cr 1.6 today  Cardiology consulted, recs appreciated
monitor i/os  Restarted lasix per cardio  Cardiology consulted, recs appreciated

## 2020-02-25 NOTE — PROGRESS NOTE ADULT - PROBLEM SELECTOR PROBLEM 5
Congestive heart failure, unspecified HF chronicity, unspecified heart failure type
Hypertension

## 2020-02-25 NOTE — PROGRESS NOTE ADULT - PROBLEM SELECTOR PLAN 2
acute on chronic, uses 2L O2 at night  keeps sats above 88%  Pulmonology consulted, recs appreciated  cont nebs  Steroids discontinued acute on chronic, uses 2L O2 at night  keeps sats above 88%  Pulmonology consulted, recs appreciated  cont nebs  Steroids discontinued  - TTE ordered, will f/u results.

## 2020-02-25 NOTE — PHARMACOTHERAPY INTERVENTION NOTE - COMMENTS
Counseled patient regarding new antibiotic and pnuemonia. Will contact tawny Duran who handles the patient's medications as per patient.

## 2020-02-25 NOTE — DISCHARGE NOTE PROVIDER - NSDCMRMEDTOKEN_GEN_ALL_CORE_FT
aspirin 81 mg oral tablet, chewable: 1 tab(s) orally once a day  carvedilol 25 mg oral tablet: 1 tab(s) orally 2 times a day  Centrum Silver oral tablet: 1 tab(s) orally once a day  ferrous sulfate 200 mg (65 mg elemental iron) oral tablet: 1 tab(s) orally once a day  Flomax 0.4 mg oral capsule: 1 cap(s) orally 2 times a day  furosemide 40 mg oral tablet: 1 tab(s) orally once a day  metFORMIN 500 mg oral tablet: 1 tab(s) orally 2 times a day  montelukast 10 mg oral tablet: 1 tab(s) orally once a day  Protonix 40 mg oral delayed release tablet: 1 tab(s) orally 2 times a day  valsartan 80 mg oral tablet: 1 tab(s) orally once a day (in the evening)  Vitamin C 100 mg oral tablet: 1 tab(s) orally once a day  Xarelto 2.5 mg oral tablet: 1 tab(s) orally 2 times a day  Zoloft 50 mg oral tablet: 1.5 tab(s) orally once a day

## 2020-02-25 NOTE — PROGRESS NOTE ADULT - ASSESSMENT
87 yo male PMH of Afib (s/p Watchman procedure 2017 on Xarelto currently being held as of 2 weeks for blood in stool), remote CABG, PCIX4 last one being 2016, Pacemaker, HTN, HLD, DM2 presenting for cough and SOB.    SOB  - SOB likely multifactorial given PNA/CHF/anemia  - CT chest with pulmonary congestion and PNA; Pro-BNP=>4500  -Remains hypervolemic with dyspnea orthopnea with Rhonchi with diffuse expiratory wheeze on exam -Continue with lasix BID , negative 2100 cc 24 hours   - Please continue to maintain strict I/Os, monitor daily weights, Cr, and K.   - Follow up TTE  - Continue with steroids, abx, nebs as per pulm and primary team   - Continue to encourgae incentive spirometer.      Hypokalemia:  -Supplement for goal K > 4.0    Afib s/p Watchman device (2017); PPM  - Continue to hold Xarelto for anemia- unclear why patient was still on xarelto given he is sp watchman procedure - follows with Dr Gonzalez   - Anemia work up as per primary team   - Monitor electrolytes, replete to keep K>4 and Mag>2  - Continue Coreg    CAD s/p CABG  - EKG without sig ischemic changes  - Continue ASA  - Unsure why not on statin - start if not contraindicated   - Continue Coreg  - Not on ACEI/ARB due to GILBERT    GILBERT  - Monitor renal indices.  Creatinine normalized 1.4  - Avoid nephrotoxics  - Renal following    GIB  - Follow FOBT  - Continue to trend Hgb    Further cardiac workup will depend on clinical course.   All other workup per primary team. Will followup.    Aziza Elizabeth FNP-C  Cardiology NP  [id number] [phone]

## 2020-02-25 NOTE — PROGRESS NOTE ADULT - PROBLEM SELECTOR PLAN 8
chronic, stable  cont home meds  monitor on tele  apprec cardio recs Dr. chery  check echo  not on ac due to gi bleed workup underway
sec to recent gi bleed and eliquis  outpt workup underway

## 2020-02-25 NOTE — PROGRESS NOTE ADULT - PROBLEM SELECTOR PROBLEM 3
GILBERT (acute kidney injury)
Hypokalemia
GILBERT (acute kidney injury)

## 2020-02-25 NOTE — PROGRESS NOTE ADULT - PROBLEM SELECTOR PROBLEM 9
Anemia, unspecified type
Need for prophylactic measure

## 2020-02-25 NOTE — PROGRESS NOTE ADULT - PROBLEM SELECTOR PLAN 9
scds b/l for now as gi bleed on ac
sec to recent gi bleed and eliquis  outpt workup underway
scds b/l for now as gi bleed on ac

## 2020-02-25 NOTE — DISCHARGE NOTE PROVIDER - PROVIDER TOKENS
FREE:[LAST:[Sarah],FIRST:[Elpidio],PHONE:[(   )    -],FAX:[(   )    -],FOLLOWUP:[1 week],ESTABLISHEDPATIENT:[T]]

## 2020-02-25 NOTE — DISCHARGE NOTE PROVIDER - HOSPITAL COURSE
FROM ADMISSION H+P:     HPI:    87 yo M with pmh chronic afib was on eliquis but stopped 2 weeks ago due to gi bleed, htn. hld, CABG x3, CAD, sepsis sec to prostate bx, DM2, prev MI, PPM recently checked at cardio/pmd off states was ok who p/w with 4 days of worsening weakness, cough with yellowish sputum production and minimal improvement despite being rx doxycycline 3 days ago. Pt also has some shortness of breath and worsening wheezing. Denies any other acute complants at this time.         	In the ED pt had a septic worup, give iv antibx and recommended admission, pt was seen by pulm who recommended ct chest, nebs for poss copd exacerbation with ?chf        ---    HOSPITAL COURSE:     Patient was admitted to telemetry for acute copd exacerbation, suspected CHF exacerbation, and RLL PNA, CAP. Pulmonology, Dr. Winters, was consulted. Patient was started on systemic steroids, duonebs, robitussin, mucinex, and supplemental oxygen. He was also started on ceftriaxone and azithromycin for pneumonia. Patient was also seen by cardiology, Dr. Hernandez. Patient was started on IV lasix for fluid management. Anticoagulation was held for Afib because of recent history of GI bleed. Carvedilol was used for rate control. Patient was also seen by nephrology, Dr. Bowen, who held ARB and diuretics. Physical therapy evaluated the patient and recommended discharge to home with home assistance/PT. The patient's symptoms improved over the course of his hospitalization.         ---    CONSULTANTS:         ---    TIME SPENT:    The total amount of time spent reviewing the hospital notes, laboratory values, imaging findings, assessing/counseling the patient, discussing with consultant physicians, social work, nursing staff took -- minutes        ---    FINAL DISCHARGE DIAGNOSIS LIST:    Please see last daily progress note for final discharge diagnoses.        ---    Primary care provider was made aware of plan for discharge:      [  ] NO     [  ] YES FROM ADMISSION H+P:     HPI:    89 yo M with pmh chronic afib was on eliquis but stopped 2 weeks ago due to gi bleed, htn. hld, CABG x3, CAD, sepsis sec to prostate bx, DM2, prev MI, PPM recently checked at cardio/pmd off states was ok who p/w with 4 days of worsening weakness, cough with yellowish sputum production and minimal improvement despite being rx doxycycline 3 days ago. Pt also has some shortness of breath and worsening wheezing. Denies any other acute complants at this time.         In the ED pt had a septic workup, give iv antibx and recommended admission, pt was seen by pulm who recommended ct chest, nebs for poss copd exacerbation with ?chf            HOSPITAL COURSE:     Patient was admitted to telemetry for acute copd exacerbation, suspected CHF exacerbation, and RLL PNA, CAP. Pulmonology, Dr. Winters, was consulted. Patient was started on systemic steroids, duonebs, robitussin, mucinex, and supplemental oxygen. He was also started on ceftriaxone and azithromycin for pneumonia. Patient was also seen by cardiology, Dr. Hernandez. Patient was started on IV lasix for fluid management. Anticoagulation was held for Afib because of recent history of GI bleed. Carvedilol was used for rate control. Patient was also seen by nephrology, Dr. Bowen, who held ARB and diuretics. Physical therapy evaluated the patient and recommended discharge to home with home assistance/PT. The patient's symptoms improved over the course of his hospitalization.         ~At this time patient is medically stable for discharge. Patient ambulating with PT. on room. air. Wheezing has improved.         Will dc abx and send patient home with PO lasix. Discussed dc plan with son. agrees with DC plan. recommended patient to followup with his private cardiologist in one week        Greater than 30 minutes spent face to face encounter on discharge planning including care coordination planning, disposition and medication reconcillation

## 2020-02-25 NOTE — DISCHARGE NOTE PROVIDER - NSDCCPCAREPLAN_GEN_ALL_CORE_FT
PRINCIPAL DISCHARGE DIAGNOSIS  Diagnosis: Pneumonia due to infectious organism, unspecified laterality, unspecified part of lung  Assessment and Plan of Treatment: Resolved. No further antibiotics      SECONDARY DISCHARGE DIAGNOSES  Diagnosis: Acute CHF  Assessment and Plan of Treatment: Continue oral lasix at home  Continue baby aspirin    Diagnosis: COPD exacerbation  Assessment and Plan of Treatment: Resolved

## 2020-02-25 NOTE — PROGRESS NOTE ADULT - PROBLEM SELECTOR PROBLEM 7
Hyperlipemia
Persistent atrial fibrillation

## 2020-02-25 NOTE — PROGRESS NOTE ADULT - PROBLEM SELECTOR PLAN 6
chronic, stable  cont home meds

## 2020-02-25 NOTE — PROGRESS NOTE ADULT - SUBJECTIVE AND OBJECTIVE BOX
Patient is a 88y old  Male who presents with a chief complaint of shortness of breath (25 Feb 2020 08:19)      INTERVAL HPI/OVERNIGHT EVENTS: Patient seen and examined at bedside. No overnight events occurred. Patient has no complaints at this time. Admits coughing, especially with deep breathing. Denies fevers, chills, headache, lightheadedness, chest pain, dyspnea, abdominal pain, n/v/d/c.    MEDICATIONS  (STANDING):  albuterol/ipratropium for Nebulization. 3 milliLiter(s) Nebulizer every 6 hours  ascorbic acid 500 milliGRAM(s) Oral daily  aspirin  chewable 81 milliGRAM(s) Oral daily  carvedilol 25 milliGRAM(s) Oral every 12 hours  cefuroxime   Tablet 500 milliGRAM(s) Oral every 12 hours  dextrose 5%. 1000 milliLiter(s) (50 mL/Hr) IV Continuous <Continuous>  dextrose 50% Injectable 12.5 Gram(s) IV Push once  dextrose 50% Injectable 25 Gram(s) IV Push once  dextrose 50% Injectable 25 Gram(s) IV Push once  ferrous    sulfate 325 milliGRAM(s) Oral daily  furosemide   Injectable 40 milliGRAM(s) IV Push every 12 hours  guaiFENesin  milliGRAM(s) Oral every 12 hours  insulin lispro (HumaLOG) corrective regimen sliding scale   SubCutaneous three times a day before meals  insulin lispro (HumaLOG) corrective regimen sliding scale   SubCutaneous at bedtime  montelukast 10 milliGRAM(s) Oral daily  multivitamin/minerals 1 Tablet(s) Oral daily  pantoprazole    Tablet 40 milliGRAM(s) Oral two times a day  potassium chloride    Tablet ER 40 milliEquivalent(s) Oral every 4 hours  sertraline 50 milliGRAM(s) Oral daily  sodium chloride 3%  Inhalation 3 milliLiter(s) Inhalation every 6 hours  tamsulosin 0.4 milliGRAM(s) Oral two times a day    MEDICATIONS  (PRN):  dextrose 40% Gel 15 Gram(s) Oral once PRN Blood Glucose LESS THAN 70 milliGRAM(s)/deciliter  glucagon  Injectable 1 milliGRAM(s) IntraMuscular once PRN Glucose LESS THAN 70 milligrams/deciliter      Allergies    No Known Allergies    Intolerances        REVIEW OF SYSTEMS:  CONSTITUTIONAL: No fever or chills  HEENT:  No headache, no sore throat  RESPIRATORY: Admits cough. Denies wheezing, or shortness of breath  CARDIOVASCULAR: No chest pain, palpitations  GASTROINTESTINAL: No abd pain, nausea, vomiting, or diarrhea  GENITOURINARY: No dysuria, frequency, or hematuria  NEUROLOGICAL: no focal weakness or dizziness  MUSCULOSKELETAL: no myalgias     Vital Signs Last 24 Hrs  T(C): 36.8 (25 Feb 2020 05:13), Max: 36.8 (25 Feb 2020 05:13)  T(F): 98.2 (25 Feb 2020 05:13), Max: 98.2 (25 Feb 2020 05:13)  HR: 70 (25 Feb 2020 07:44) (69 - 77)  BP: 148/82 (25 Feb 2020 05:13) (136/64 - 160/74)  BP(mean): --  RR: 18 (25 Feb 2020 05:13) (17 - 96)  SpO2: 97% (25 Feb 2020 07:44) (93% - 99%)    PHYSICAL EXAM:  GENERAL: NAD, Resting in bed comfortably. Patient just ended nebulizer treatment.   HEENT:  anicteric, moist mucous membranes, EOMI  CHEST/LUNG:  decreased breath sounds b/l, expiratory wheeze heard b/l  HEART:  RRR, S1, S2, no murmurs appreciated.   ABDOMEN:  BS+, soft, nontender, nondistended  EXTREMITIES: no edema, cyanosis, or calf tenderness  NERVOUS SYSTEM: answers questions and follows commands appropriately    LABS:                        9.8    7.12  )-----------( 178      ( 25 Feb 2020 07:00 )             29.2     CBC Full  -  ( 25 Feb 2020 07:00 )  WBC Count : 7.12 K/uL  Hemoglobin : 9.8 g/dL  Hematocrit : 29.2 %  Platelet Count - Automated : 178 K/uL  Mean Cell Volume : 89.0 fl  Mean Cell Hemoglobin : 29.9 pg  Mean Cell Hemoglobin Concentration : 33.6 gm/dL  Auto Neutrophil # : x  Auto Lymphocyte # : x  Auto Monocyte # : x  Auto Eosinophil # : x  Auto Basophil # : x  Auto Neutrophil % : x  Auto Lymphocyte % : x  Auto Monocyte % : x  Auto Eosinophil % : x  Auto Basophil % : x    25 Feb 2020 07:00    139    |  100    |  57     ----------------------------<  147    3.1     |  30     |  1.40     Ca    9.3        25 Feb 2020 07:00          CAPILLARY BLOOD GLUCOSE      POCT Blood Glucose.: 176 mg/dL (25 Feb 2020 08:00)  POCT Blood Glucose.: 226 mg/dL (24 Feb 2020 21:36)  POCT Blood Glucose.: 211 mg/dL (24 Feb 2020 17:12)  POCT Blood Glucose.: 260 mg/dL (24 Feb 2020 13:56)  POCT Blood Glucose.: 256 mg/dL (24 Feb 2020 13:36)  POCT Blood Glucose.: 255 mg/dL (24 Feb 2020 11:51)        Culture - Blood (collected 02-20-20 @ 12:34)  Source: .Blood Blood-Peripheral  Preliminary Report (02-21-20 @ 13:01):    No growth to date.    Culture - Blood (collected 02-20-20 @ 12:34)  Source: .Blood Blood-Peripheral  Preliminary Report (02-21-20 @ 13:01):    No growth to date.        RADIOLOGY & ADDITIONAL TESTS:    Personally reviewed.     Consultant(s) Notes Reviewed:  [x] YES  [ ] NO Patient is a 88y old  Male who presents with a chief complaint of shortness of breath (25 Feb 2020 08:19)      INTERVAL HPI/OVERNIGHT EVENTS: Patient seen and examined at bedside. No overnight events occurred. Patient has no complaints at this time. Admits coughing, especially with deep breathing. Denies fevers, chills, headache, lightheadedness, chest pain, dyspnea, abdominal pain, n/v/d/c.    MEDICATIONS  (STANDING):  albuterol/ipratropium for Nebulization. 3 milliLiter(s) Nebulizer every 6 hours  ascorbic acid 500 milliGRAM(s) Oral daily  aspirin  chewable 81 milliGRAM(s) Oral daily  carvedilol 25 milliGRAM(s) Oral every 12 hours  cefuroxime   Tablet 500 milliGRAM(s) Oral every 12 hours  dextrose 5%. 1000 milliLiter(s) (50 mL/Hr) IV Continuous <Continuous>  dextrose 50% Injectable 12.5 Gram(s) IV Push once  dextrose 50% Injectable 25 Gram(s) IV Push once  dextrose 50% Injectable 25 Gram(s) IV Push once  ferrous    sulfate 325 milliGRAM(s) Oral daily  furosemide   Injectable 40 milliGRAM(s) IV Push every 12 hours  guaiFENesin  milliGRAM(s) Oral every 12 hours  insulin lispro (HumaLOG) corrective regimen sliding scale   SubCutaneous three times a day before meals  insulin lispro (HumaLOG) corrective regimen sliding scale   SubCutaneous at bedtime  montelukast 10 milliGRAM(s) Oral daily  multivitamin/minerals 1 Tablet(s) Oral daily  pantoprazole    Tablet 40 milliGRAM(s) Oral two times a day  potassium chloride    Tablet ER 40 milliEquivalent(s) Oral every 4 hours  sertraline 50 milliGRAM(s) Oral daily  sodium chloride 3%  Inhalation 3 milliLiter(s) Inhalation every 6 hours  tamsulosin 0.4 milliGRAM(s) Oral two times a day    MEDICATIONS  (PRN):  dextrose 40% Gel 15 Gram(s) Oral once PRN Blood Glucose LESS THAN 70 milliGRAM(s)/deciliter  glucagon  Injectable 1 milliGRAM(s) IntraMuscular once PRN Glucose LESS THAN 70 milligrams/deciliter      Allergies    No Known Allergies    Intolerances        REVIEW OF SYSTEMS:  CONSTITUTIONAL: No fever or chills  HEENT:  No headache, no sore throat  RESPIRATORY: Admits cough. Denies wheezing, or shortness of breath  CARDIOVASCULAR: No chest pain, palpitations  GASTROINTESTINAL: No abd pain, nausea, vomiting, or diarrhea  GENITOURINARY: No dysuria, frequency, or hematuria  NEUROLOGICAL: no focal weakness or dizziness  MUSCULOSKELETAL: no myalgias     Vital Signs Last 24 Hrs  T(C): 36.8 (25 Feb 2020 05:13), Max: 36.8 (25 Feb 2020 05:13)  T(F): 98.2 (25 Feb 2020 05:13), Max: 98.2 (25 Feb 2020 05:13)  HR: 70 (25 Feb 2020 07:44) (69 - 77)  BP: 148/82 (25 Feb 2020 05:13) (136/64 - 160/74)  BP(mean): --  RR: 18 (25 Feb 2020 05:13) (17 - 96)  SpO2: 97% (25 Feb 2020 07:44) (93% - 99%)    PHYSICAL EXAM:  GENERAL: NAD, Resting in bed comfortably. Patient just ended nebulizer treatment.   HEENT:  anicteric, moist mucous membranes, EOMI  CHEST/LUNG:  decreased breath sounds b/l, expiratory wheeze heard b/l greater at lung bases  HEART:  RRR, S1, S2, no murmurs appreciated.   ABDOMEN:  BS+, soft, nontender, nondistended  EXTREMITIES: no edema, cyanosis, or calf tenderness  NERVOUS SYSTEM: answers questions and follows commands appropriately    LABS:                        9.8    7.12  )-----------( 178      ( 25 Feb 2020 07:00 )             29.2     CBC Full  -  ( 25 Feb 2020 07:00 )  WBC Count : 7.12 K/uL  Hemoglobin : 9.8 g/dL  Hematocrit : 29.2 %  Platelet Count - Automated : 178 K/uL  Mean Cell Volume : 89.0 fl  Mean Cell Hemoglobin : 29.9 pg  Mean Cell Hemoglobin Concentration : 33.6 gm/dL  Auto Neutrophil # : x  Auto Lymphocyte # : x  Auto Monocyte # : x  Auto Eosinophil # : x  Auto Basophil # : x  Auto Neutrophil % : x  Auto Lymphocyte % : x  Auto Monocyte % : x  Auto Eosinophil % : x  Auto Basophil % : x    25 Feb 2020 07:00    139    |  100    |  57     ----------------------------<  147    3.1     |  30     |  1.40     Ca    9.3        25 Feb 2020 07:00          CAPILLARY BLOOD GLUCOSE      POCT Blood Glucose.: 176 mg/dL (25 Feb 2020 08:00)  POCT Blood Glucose.: 226 mg/dL (24 Feb 2020 21:36)  POCT Blood Glucose.: 211 mg/dL (24 Feb 2020 17:12)  POCT Blood Glucose.: 260 mg/dL (24 Feb 2020 13:56)  POCT Blood Glucose.: 256 mg/dL (24 Feb 2020 13:36)  POCT Blood Glucose.: 255 mg/dL (24 Feb 2020 11:51)        Culture - Blood (collected 02-20-20 @ 12:34)  Source: .Blood Blood-Peripheral  Preliminary Report (02-21-20 @ 13:01):    No growth to date.    Culture - Blood (collected 02-20-20 @ 12:34)  Source: .Blood Blood-Peripheral  Preliminary Report (02-21-20 @ 13:01):    No growth to date.        RADIOLOGY & ADDITIONAL TESTS:    Personally reviewed.     Consultant(s) Notes Reviewed:  [x] YES  [ ] NO

## 2020-02-25 NOTE — PROGRESS NOTE ADULT - SUBJECTIVE AND OBJECTIVE BOX
Date/Time Patient Seen:  		  Referring MD:   Data Reviewed	       Patient is a 88y old  Male who presents with a chief complaint of shortness of breath (24 Feb 2020 17:02)      Subjective/HPI     PAST MEDICAL & SURGICAL HISTORY:  Coronary artery disease involving native coronary artery of native heart, angina presence unspecified  Persistent atrial fibrillation: wwas on eliquis, stopped due to gi bleed workup underway  Type 2 diabetes mellitus without complication, without long-term current use of insulin  Hyperlipemia  MI, old  Hypertension  Sepsis: sec to prostate bx  S/P coronary artery bypass graft x 3  Pacemaker: checked at pmd/cardio office 1 week ago  Presence of Watchman left atrial appendage closure device  H/O prostate biopsy  History of cholecystectomy  Artificial cardiac pacemaker  Stented coronary artery  H/O coronary angioplasty: 4 stents  S/P CABG x 3  No significant past surgical history        Medication list         MEDICATIONS  (STANDING):  albuterol/ipratropium for Nebulization. 3 milliLiter(s) Nebulizer every 6 hours  ascorbic acid 500 milliGRAM(s) Oral daily  aspirin  chewable 81 milliGRAM(s) Oral daily  carvedilol 25 milliGRAM(s) Oral every 12 hours  cefuroxime   Tablet 500 milliGRAM(s) Oral every 12 hours  dextrose 5%. 1000 milliLiter(s) (50 mL/Hr) IV Continuous <Continuous>  dextrose 50% Injectable 12.5 Gram(s) IV Push once  dextrose 50% Injectable 25 Gram(s) IV Push once  dextrose 50% Injectable 25 Gram(s) IV Push once  ferrous    sulfate 325 milliGRAM(s) Oral daily  furosemide   Injectable 40 milliGRAM(s) IV Push every 12 hours  guaiFENesin  milliGRAM(s) Oral every 12 hours  insulin lispro (HumaLOG) corrective regimen sliding scale   SubCutaneous three times a day before meals  insulin lispro (HumaLOG) corrective regimen sliding scale   SubCutaneous at bedtime  montelukast 10 milliGRAM(s) Oral daily  multivitamin/minerals 1 Tablet(s) Oral daily  pantoprazole    Tablet 40 milliGRAM(s) Oral two times a day  potassium chloride    Tablet ER 40 milliEquivalent(s) Oral every 4 hours  sertraline 50 milliGRAM(s) Oral daily  sodium chloride 3%  Inhalation 3 milliLiter(s) Inhalation every 6 hours  tamsulosin 0.4 milliGRAM(s) Oral two times a day    MEDICATIONS  (PRN):  dextrose 40% Gel 15 Gram(s) Oral once PRN Blood Glucose LESS THAN 70 milliGRAM(s)/deciliter  glucagon  Injectable 1 milliGRAM(s) IntraMuscular once PRN Glucose LESS THAN 70 milligrams/deciliter         Vitals log        ICU Vital Signs Last 24 Hrs  T(C): 36.8 (25 Feb 2020 05:13), Max: 36.8 (25 Feb 2020 05:13)  T(F): 98.2 (25 Feb 2020 05:13), Max: 98.2 (25 Feb 2020 05:13)  HR: 70 (25 Feb 2020 07:44) (69 - 77)  BP: 148/82 (25 Feb 2020 05:13) (136/64 - 160/74)  BP(mean): --  ABP: --  ABP(mean): --  RR: 18 (25 Feb 2020 05:13) (17 - 96)  SpO2: 97% (25 Feb 2020 07:44) (93% - 99%)           Input and Output:  I&O's Detail    24 Feb 2020 07:01  -  25 Feb 2020 07:00  --------------------------------------------------------  IN:  Total IN: 0 mL    OUT:    Voided: 2100 mL  Total OUT: 2100 mL    Total NET: -2100 mL          Lab Data                        9.8    7.12  )-----------( 178      ( 25 Feb 2020 07:00 )             29.2     02-25    139  |  100  |  57<H>  ----------------------------<  147<H>  3.1<L>   |  30  |  1.40<H>    Ca    9.3      25 Feb 2020 07:00              Review of Systems	      Objective     Physical Examination    heart s1s2  lung dec BS  abd soft  head nc  head at      Pertinent Lab findings & Imaging      Sarah:  NO   Adequate UO     I&O's Detail    24 Feb 2020 07:01  -  25 Feb 2020 07:00  --------------------------------------------------------  IN:  Total IN: 0 mL    OUT:    Voided: 2100 mL  Total OUT: 2100 mL    Total NET: -2100 mL               Discussed with:     Cultures:	        Radiology

## 2020-02-25 NOTE — PROGRESS NOTE ADULT - PROBLEM SELECTOR PLAN 5
monitor i/os  Restarted lasix per cardio  Cardiology consulted, recs appreciated monitor i/os  Restarted lasix per cardio  Cardiology consulted, recs appreciated  - TTE ordered, will f/u results.

## 2020-02-25 NOTE — PROGRESS NOTE ADULT - PROBLEM SELECTOR PROBLEM 4
Congestive heart failure, unspecified HF chronicity, unspecified heart failure type
GILBERT (acute kidney injury)
Congestive heart failure, unspecified HF chronicity, unspecified heart failure type

## 2020-02-25 NOTE — PROGRESS NOTE ADULT - SUBJECTIVE AND OBJECTIVE BOX
[facility] -- Raman Ceja, Deneen, Aryan, David Anton Savella  Office # [phone]      Follow Up:  afib, sob    Subjective/Observations:   Seen at bedside, + non productive cough + LEE  no chest pain no dizziness or palpitations no fever or chills     REVIEW OF SYSTEMS: All other review of systems is negative unless indicated above    PAST MEDICAL & SURGICAL HISTORY:  Coronary artery disease involving native coronary artery of native heart, angina presence unspecified  Persistent atrial fibrillation: wwas on eliquis, stopped due to gi bleed workup underway  Type 2 diabetes mellitus without complication, without long-term current use of insulin  Hyperlipemia  MI, old  Hypertension  Sepsis: sec to prostate bx  S/P coronary artery bypass graft x 3  Pacemaker: checked at pmd/cardio office 1 week ago  Presence of Watchman left atrial appendage closure device  H/O prostate biopsy  History of cholecystectomy  Artificial cardiac pacemaker  Stented coronary artery  H/O coronary angioplasty: 4 stents  S/P CABG x 3      MEDICATIONS  (STANDING):  albuterol/ipratropium for Nebulization. 3 milliLiter(s) Nebulizer every 6 hours  ascorbic acid 500 milliGRAM(s) Oral daily  aspirin  chewable 81 milliGRAM(s) Oral daily  carvedilol 25 milliGRAM(s) Oral every 12 hours  cefuroxime   Tablet 500 milliGRAM(s) Oral every 12 hours  dextrose 5%. 1000 milliLiter(s) (50 mL/Hr) IV Continuous <Continuous>  dextrose 50% Injectable 12.5 Gram(s) IV Push once  dextrose 50% Injectable 25 Gram(s) IV Push once  dextrose 50% Injectable 25 Gram(s) IV Push once  ferrous    sulfate 325 milliGRAM(s) Oral daily  furosemide   Injectable 40 milliGRAM(s) IV Push every 12 hours  guaiFENesin  milliGRAM(s) Oral every 12 hours  insulin lispro (HumaLOG) corrective regimen sliding scale   SubCutaneous three times a day before meals  insulin lispro (HumaLOG) corrective regimen sliding scale   SubCutaneous at bedtime  montelukast 10 milliGRAM(s) Oral daily  multivitamin/minerals 1 Tablet(s) Oral daily  pantoprazole    Tablet 40 milliGRAM(s) Oral two times a day  potassium chloride    Tablet ER 40 milliEquivalent(s) Oral every 4 hours  sertraline 50 milliGRAM(s) Oral daily  sodium chloride 3%  Inhalation 3 milliLiter(s) Inhalation every 6 hours  tamsulosin 0.4 milliGRAM(s) Oral two times a day    MEDICATIONS  (PRN):  dextrose 40% Gel 15 Gram(s) Oral once PRN Blood Glucose LESS THAN 70 milliGRAM(s)/deciliter  glucagon  Injectable 1 milliGRAM(s) IntraMuscular once PRN Glucose LESS THAN 70 milligrams/deciliter      Allergies    No Known Allergies    Intolerances        Vital Signs Last 24 Hrs  T(C): 36.3 (25 Feb 2020 08:30), Max: 36.8 (25 Feb 2020 05:13)  T(F): 97.4 (25 Feb 2020 08:30), Max: 98.2 (25 Feb 2020 05:13)  HR: 71 (25 Feb 2020 08:30) (69 - 77)  BP: 121/62 (25 Feb 2020 08:30) (121/62 - 160/74)  BP(mean): --  RR: 24 (25 Feb 2020 08:30) (17 - 96)  SpO2: 92% (25 Feb 2020 08:30) (92% - 99%)    I&O's Summary    24 Feb 2020 07:01  -  25 Feb 2020 07:00  --------------------------------------------------------  IN: 0 mL / OUT: 2100 mL / NET: -2100 mL          PHYSICAL EXAM:  TELE: not on tele   Constitutional: NAD, awake and alert, well-developed  HEENT: Moist Mucous Membranes, Anicteric  Pulmonary: Non-labored,diffuse exp wheeze with rhonchi, slightly better compared to yesterdays exam   Cardiovascular: Regular, S1 and S2 nl, No murmurs, rubs, gallops or clicks  Gastrointestinal: Bowel Sounds present, soft, nontender.   Lymph: No lymphadenopathy. No peripheral edema.  Skin: No visible rashes or ulcers.  Psych:  Mood & affect appropriate    LABS: All Labs Reviewed:                        9.8    7.12  )-----------( 178      ( 25 Feb 2020 07:00 )             29.2                         10.6   8.61  )-----------( 181      ( 24 Feb 2020 06:38 )             32.5                         9.5    7.08  )-----------( 129      ( 23 Feb 2020 09:13 )             28.5     25 Feb 2020 07:00    139    |  100    |  57     ----------------------------<  147    3.1     |  30     |  1.40   24 Feb 2020 06:38    140    |  100    |  65     ----------------------------<  142    3.5     |  29     |  1.40   23 Feb 2020 09:13    139    |  100    |  60     ----------------------------<  200    3.7     |  32     |  1.50     Ca    9.3        25 Feb 2020 07:00  Ca    9.5        24 Feb 2020 06:38  Ca    9.3        23 Feb 2020 09:13               ECG:  < from: 12 Lead ECG (02.20.20 @ 08:24) >  Ventricular Rate 83 BPM    Atrial Rate 85 BPM    P-R Interval 196 ms    QRS Duration 200 ms    Q-T Interval 476 ms    QTC Calculation(Bezet) 559 ms    R Axis -50 degrees    T Axis 120 degrees    Diagnosis Line AV dual-paced rhythm with frequent premature ventricular complexes  Abnormal ECG  When compared with ECG of 28-JUN-2013 21:25,  premature ventricular complexes are now present  Vent. rate has increased BY  23 BPM    < end of copied text >      Echo:  < from: MARLENE w/o TTE (w/3D Echo) (10.12.17 @ 07:55) >  1. Mitral annular calcification, otherwise normal mitral  valve. Mildmitral regurgitation.  2. Calcified trileaflet aortic valve with normal opening.  Mild aortic regurgitation.  3. Moderate aortic root dilatation  (Ao: 4.8 cm at the  sinuses of Valsalva).  Mild non-mobile atherosclerotic  plaque in the aortic arch and descending thoracic aorta.  4. Dilated left atrium.  A Watchman closure device is  present and is well seated at the ostium of the left atrial  appendage (NYA).  Colour Doppler interrogation demonstrates  a small amount of residual flow around the device.  The  width of the joel-device jet is about  2-3 mm by colour  Doppler interrogation.  The joel-device flow occurs on the  medial aspect of the device.  5. Endocardium not well visualized; grossly normal left  ventricular systolic function.  6.Normal right ventricular size and function.  A device  wire is noted in the right heart.  7. Contrast injection demonstrates no evidence of a patent  foramen ovale.  ---------------------------------    < end of copied text >    Radiology:

## 2020-02-25 NOTE — PROGRESS NOTE ADULT - NSHPATTENDINGPLANDISCUSS_GEN_ALL_CORE
patient, RN
patient, son, RN, PCP, Cardio
patient, son, RN, Resident team
patient, daughter, PCP, RN, Resident team
patient, daughter, RN, Resident team

## 2020-02-25 NOTE — PROGRESS NOTE ADULT - PROBLEM SELECTOR PROBLEM 8
Anemia, unspecified type
Persistent atrial fibrillation
Anemia, unspecified type

## 2020-02-25 NOTE — PROGRESS NOTE ADULT - PROBLEM SELECTOR PLAN 7
chronic, stable  cont home meds
chronic, stable  cont home meds  monitor on tele  apprec cardio recs Dr. chery  check echo  not on ac due to gi bleed workup underway

## 2020-02-25 NOTE — PROGRESS NOTE ADULT - PROBLEM SELECTOR PLAN 3
Patient noted to have elevated Cr. 1.7 in ED  - Down to 1.3 today  - Nephrology consulted - GILBERT on CKD most likely due to ATN secondary to sepsis complicated by ARB and diuretics. Restart diuretics as patient in fluid overload, monitor Cr  - Renal US - Bilaterally increased renal cortical echogenicity compatible with medical renal disease. No hydronephrosis.  - Will continue to monitor BMP
Patient noted to have elevated Cr. 1.7 in ED  - Down to 1.6 today  - Nephrology consulted - GILBERT on CKD most likely due to ATN secondary to sepsis complicated by ARB and diuretics. Will continue to hold the diuretics and also ARB for now.  - Renal US - Bilaterally increased renal cortical echogenicity compatible with medical renal disease. No hydronephrosis.  - Will continue to monitor BMP
Patient noted to have elevated Cr. 1.7 in ED  - Increased to 1.5 today- possibly from lasix  - Nephrology consulted - GILBERT on CKD most likely due to ATN secondary to sepsis complicated by ARB and diuretics. Restarted diuretics as patient in fluid overload, monitor Cr  - Renal US - Bilaterally increased renal cortical echogenicity compatible with medical renal disease. No hydronephrosis.  - Will continue to monitor BMP
Patient's potassium 3.1 today  - Given 40meq K PO x 2.
Patient noted to have elevated Cr. 1.7 in ED  - decreased to 1.4 today- possibly from lasix  - Nephrology consulted - GILBERT on CKD most likely due to ATN secondary to sepsis complicated by ARB and diuretics. Restarted diuretics as patient in fluid overload, monitor Cr  - Renal US - Bilaterally increased renal cortical echogenicity compatible with medical renal disease. No hydronephrosis.  - Will continue to monitor BMP

## 2020-02-26 ENCOUNTER — TRANSCRIPTION ENCOUNTER (OUTPATIENT)
Age: 85
End: 2020-02-26

## 2020-02-26 VITALS
HEART RATE: 69 BPM | OXYGEN SATURATION: 94 % | TEMPERATURE: 98 F | SYSTOLIC BLOOD PRESSURE: 146 MMHG | RESPIRATION RATE: 15 BRPM | DIASTOLIC BLOOD PRESSURE: 76 MMHG

## 2020-02-26 LAB
ANION GAP SERPL CALC-SCNC: 9 MMOL/L — SIGNIFICANT CHANGE UP (ref 5–17)
BUN SERPL-MCNC: 55 MG/DL — HIGH (ref 7–23)
CALCIUM SERPL-MCNC: 9.7 MG/DL — SIGNIFICANT CHANGE UP (ref 8.5–10.1)
CHLORIDE SERPL-SCNC: 100 MMOL/L — SIGNIFICANT CHANGE UP (ref 96–108)
CO2 SERPL-SCNC: 31 MMOL/L — SIGNIFICANT CHANGE UP (ref 22–31)
CREAT SERPL-MCNC: 1.4 MG/DL — HIGH (ref 0.5–1.3)
CULTURE RESULTS: NO GROWTH — SIGNIFICANT CHANGE UP
GLUCOSE SERPL-MCNC: 157 MG/DL — HIGH (ref 70–99)
HCT VFR BLD CALC: 30.8 % — LOW (ref 39–50)
HGB BLD-MCNC: 10.2 G/DL — LOW (ref 13–17)
MCHC RBC-ENTMCNC: 29.7 PG — SIGNIFICANT CHANGE UP (ref 27–34)
MCHC RBC-ENTMCNC: 33.1 GM/DL — SIGNIFICANT CHANGE UP (ref 32–36)
MCV RBC AUTO: 89.8 FL — SIGNIFICANT CHANGE UP (ref 80–100)
NRBC # BLD: 0 /100 WBCS — SIGNIFICANT CHANGE UP (ref 0–0)
PLATELET # BLD AUTO: 191 K/UL — SIGNIFICANT CHANGE UP (ref 150–400)
POTASSIUM SERPL-MCNC: 3.8 MMOL/L — SIGNIFICANT CHANGE UP (ref 3.5–5.3)
POTASSIUM SERPL-SCNC: 3.8 MMOL/L — SIGNIFICANT CHANGE UP (ref 3.5–5.3)
RBC # BLD: 3.43 M/UL — LOW (ref 4.2–5.8)
RBC # FLD: 14 % — SIGNIFICANT CHANGE UP (ref 10.3–14.5)
SODIUM SERPL-SCNC: 140 MMOL/L — SIGNIFICANT CHANGE UP (ref 135–145)
SPECIMEN SOURCE: SIGNIFICANT CHANGE UP
WBC # BLD: 8.93 K/UL — SIGNIFICANT CHANGE UP (ref 3.8–10.5)
WBC # FLD AUTO: 8.93 K/UL — SIGNIFICANT CHANGE UP (ref 3.8–10.5)

## 2020-02-26 PROCEDURE — 83036 HEMOGLOBIN GLYCOSYLATED A1C: CPT

## 2020-02-26 PROCEDURE — 99239 HOSP IP/OBS DSCHRG MGMT >30: CPT

## 2020-02-26 PROCEDURE — 82803 BLOOD GASES ANY COMBINATION: CPT

## 2020-02-26 PROCEDURE — 76775 US EXAM ABDO BACK WALL LIM: CPT

## 2020-02-26 PROCEDURE — 76770 US EXAM ABDO BACK WALL COMP: CPT

## 2020-02-26 PROCEDURE — 97530 THERAPEUTIC ACTIVITIES: CPT

## 2020-02-26 PROCEDURE — 96374 THER/PROPH/DIAG INJ IV PUSH: CPT

## 2020-02-26 PROCEDURE — 71045 X-RAY EXAM CHEST 1 VIEW: CPT

## 2020-02-26 PROCEDURE — 94760 N-INVAS EAR/PLS OXIMETRY 1: CPT

## 2020-02-26 PROCEDURE — 83735 ASSAY OF MAGNESIUM: CPT

## 2020-02-26 PROCEDURE — 85610 PROTHROMBIN TIME: CPT

## 2020-02-26 PROCEDURE — 94640 AIRWAY INHALATION TREATMENT: CPT

## 2020-02-26 PROCEDURE — 36415 COLL VENOUS BLD VENIPUNCTURE: CPT

## 2020-02-26 PROCEDURE — 84443 ASSAY THYROID STIM HORMONE: CPT

## 2020-02-26 PROCEDURE — 83880 ASSAY OF NATRIURETIC PEPTIDE: CPT

## 2020-02-26 PROCEDURE — 80053 COMPREHEN METABOLIC PANEL: CPT

## 2020-02-26 PROCEDURE — 97116 GAIT TRAINING THERAPY: CPT

## 2020-02-26 PROCEDURE — 71250 CT THORAX DX C-: CPT

## 2020-02-26 PROCEDURE — 80048 BASIC METABOLIC PNL TOTAL CA: CPT

## 2020-02-26 PROCEDURE — 97162 PT EVAL MOD COMPLEX 30 MIN: CPT

## 2020-02-26 PROCEDURE — 93005 ELECTROCARDIOGRAM TRACING: CPT

## 2020-02-26 PROCEDURE — 96375 TX/PRO/DX INJ NEW DRUG ADDON: CPT

## 2020-02-26 PROCEDURE — 87040 BLOOD CULTURE FOR BACTERIA: CPT

## 2020-02-26 PROCEDURE — 99232 SBSQ HOSP IP/OBS MODERATE 35: CPT

## 2020-02-26 PROCEDURE — 82962 GLUCOSE BLOOD TEST: CPT

## 2020-02-26 PROCEDURE — 94668 MNPJ CHEST WALL SBSQ: CPT

## 2020-02-26 PROCEDURE — 85730 THROMBOPLASTIN TIME PARTIAL: CPT

## 2020-02-26 PROCEDURE — 87631 RESP VIRUS 3-5 TARGETS: CPT

## 2020-02-26 PROCEDURE — 83605 ASSAY OF LACTIC ACID: CPT

## 2020-02-26 PROCEDURE — 86140 C-REACTIVE PROTEIN: CPT

## 2020-02-26 PROCEDURE — 85027 COMPLETE CBC AUTOMATED: CPT

## 2020-02-26 PROCEDURE — 87086 URINE CULTURE/COLONY COUNT: CPT

## 2020-02-26 PROCEDURE — 99285 EMERGENCY DEPT VISIT HI MDM: CPT

## 2020-02-26 PROCEDURE — 81001 URINALYSIS AUTO W/SCOPE: CPT

## 2020-02-26 RX ORDER — FUROSEMIDE 40 MG
40 TABLET ORAL EVERY 12 HOURS
Refills: 0 | Status: DISCONTINUED | OUTPATIENT
Start: 2020-02-26 | End: 2020-02-26

## 2020-02-26 RX ORDER — CEFUROXIME AXETIL 250 MG
1 TABLET ORAL
Qty: 3 | Refills: 0
Start: 2020-02-26

## 2020-02-26 RX ORDER — RIVAROXABAN 15 MG-20MG
1 KIT ORAL
Qty: 0 | Refills: 0 | DISCHARGE

## 2020-02-26 RX ADMIN — TAMSULOSIN HYDROCHLORIDE 0.4 MILLIGRAM(S): 0.4 CAPSULE ORAL at 05:24

## 2020-02-26 RX ADMIN — Medication 500 MILLIGRAM(S): at 12:35

## 2020-02-26 RX ADMIN — Medication 3 MILLILITER(S): at 07:39

## 2020-02-26 RX ADMIN — Medication 81 MILLIGRAM(S): at 12:35

## 2020-02-26 RX ADMIN — Medication 600 MILLIGRAM(S): at 05:24

## 2020-02-26 RX ADMIN — SERTRALINE 50 MILLIGRAM(S): 25 TABLET, FILM COATED ORAL at 12:35

## 2020-02-26 RX ADMIN — Medication 40 MILLIGRAM(S): at 05:25

## 2020-02-26 RX ADMIN — CARVEDILOL PHOSPHATE 25 MILLIGRAM(S): 80 CAPSULE, EXTENDED RELEASE ORAL at 05:25

## 2020-02-26 RX ADMIN — Medication 3 MILLILITER(S): at 01:03

## 2020-02-26 RX ADMIN — Medication 500 MILLIGRAM(S): at 09:01

## 2020-02-26 RX ADMIN — Medication 1: at 08:09

## 2020-02-26 RX ADMIN — Medication 1 TABLET(S): at 12:35

## 2020-02-26 RX ADMIN — Medication 2: at 12:35

## 2020-02-26 RX ADMIN — PANTOPRAZOLE SODIUM 40 MILLIGRAM(S): 20 TABLET, DELAYED RELEASE ORAL at 05:25

## 2020-02-26 RX ADMIN — SODIUM CHLORIDE 3 MILLILITER(S): 9 INJECTION INTRAMUSCULAR; INTRAVENOUS; SUBCUTANEOUS at 07:39

## 2020-02-26 RX ADMIN — Medication 325 MILLIGRAM(S): at 12:35

## 2020-02-26 RX ADMIN — SODIUM CHLORIDE 3 MILLILITER(S): 9 INJECTION INTRAMUSCULAR; INTRAVENOUS; SUBCUTANEOUS at 01:04

## 2020-02-26 RX ADMIN — MONTELUKAST 10 MILLIGRAM(S): 4 TABLET, CHEWABLE ORAL at 12:35

## 2020-02-26 NOTE — PROGRESS NOTE ADULT - REASON FOR ADMISSION
shortness of breath

## 2020-02-26 NOTE — PROGRESS NOTE ADULT - ASSESSMENT
87 yo male PMH of Afib (s/p Watchman procedure 2017 on Xarelto currently being held as of 2 weeks for blood in stool), remote CABG, PCIX4 last one being 2016, Pacemaker, HTN, HLD, DM2 presenting for cough and SOB.    SOB  - SOB likely multifactorial given PNA/CHF/anemia  - CT chest with pulmonary congestion and PNA; Pro-BNP=>4500  - He is now euvolemic.  Will switch IV Lasix to PO  - Please continue to maintain strict I/Os, monitor daily weights, Cr, and K.   - TTE showed low normal LVF, EF 50-55% with no significant valvular disease  - Continue with steroids, abx, nebs as per pulm and primary team   - Continue to encourage incentive spirometer.      Afib s/p Watchman device (2017); PPM  - Rate controlled  - Continue to hold Xarelto for anemia- unclear why patient was still on Xarelto given he is s/p watchman procedure.  He follows with Dr Gonzalez   - Monitor electrolytes, replete to keep K>4 and Mag>2  - Continue Coreg    CAD s/p CABG  - EKG without ischemic changes  - Continue ASA  - Start statin  - Continue Coreg  - Not on ACEI/ARB due to GILBERT (stable at 1.4)    GILBERT  - Monitor renal indices.  Creatinine stabilized to 1.4  - Avoid nephrotoxics  - Renal following    Further cardiac workup will depend on clinical course.   All other workup per primary team. Will followup.     Christen Ma DNP, Np-C  Cardiology   Spectra #[phone]

## 2020-02-26 NOTE — DISCHARGE NOTE NURSING/CASE MANAGEMENT/SOCIAL WORK - PATIENT PORTAL LINK FT
You can access the FollowMyHealth Patient Portal offered by [facility] by registering at the following website: [email or web address]. By joining [facility]’s FollowMyHealth portal, you will also be able to view your health information using other applications (apps) compatible with our system.

## 2020-02-26 NOTE — PROGRESS NOTE ADULT - ATTENDING COMMENTS
I personally saw and examined the patient in detail.  I have spoken to the above provider regarding the assessment and plan of care.  I reviewed the above assessment and plan of care, and agree.  I have made changes in the body of the note where appropriate.
I personally saw and examined the patient in detail.  I have spoken to the above provider regarding the assessment and plan of care.  I reviewed the above assessment and plan of care, and agree.  I have made changes in the body of the note where appropriate.
Seen/examined. agree with above.
The patient was personally seen and examined, in addition to being examined and evaluated by NP.  All elements of the note were edited where appropriate.
Seen/examined. agree with above.  Remains short of breath. IV Lasix today.
Seen/examined. agree with above.  improvement with iv lasix
I personally conducted a physical examination of the patient. I personally gathered the patient's history. I edited the above listed findings which were prepared by the listed resident physician. I personally discussed the plan of care with the patient. The questions and concerns were addressed to the best of my ability. The patient is in agreement with the listed treatment plan.    Patient states his breathing is improved, but breathing still appears labored while speaking.  Wheezing and coarse breath sounds heard throughout all lung fields. Patient denies any CP, abd pain.    #CAP  #GILBERT on CKD 2/2 ATN  #COPD  #CHF    -Continue IV antibiotics for CAP. Added standing duonebs. Encouraged oral water intake- will hold diuretics and ARB for now given GILBERT- slight improvement today.
I personally conducted a physical examination of the patient. I personally gathered the patient's history. I edited the above listed findings which were prepared by the listed resident physician. I personally discussed the plan of care with the patient. The questions and concerns were addressed to the best of my ability. The patient is in agreement with the listed treatment plan.    Patient states he feels much better, not on O2 anymore and able to converse/ambulate without SOB. States he still has cough with sputum production, but improving    #CAP  #GILBERT on CKD 2/2 ATN  #COPD  #CHF    Abx switched to PO. Continue diuresis. Appreciate, Cardio/Nephro/Pulm recs. Possible D/C in next 24-48 hours
I personally conducted a physical examination of the patient. I personally gathered the patient's history. I edited the above listed findings which were prepared by the listed resident physician. I personally discussed the plan of care with the patient. The questions and concerns were addressed to the best of my ability. The patient is in agreement with the listed treatment plan.    Patient states he feels well, still with coughing and wheezing. Denies any SOB on ambulation.    #CAP  #GILBERT on CKD 2/2 ATN  #COPD  #CHF    Abx switched to PO. Continue diuresis. Appreciate, Cardio/Nephro/Pulm recs. Possible D/C in next 24-48 hours

## 2020-02-26 NOTE — PROGRESS NOTE ADULT - PROBLEM SELECTOR PROBLEM 1
Pulmonary congestion
CAP (community acquired pneumonia)

## 2020-02-26 NOTE — PROGRESS NOTE ADULT - SUBJECTIVE AND OBJECTIVE BOX
[facility] -- Raman Ceja, Aryan Brothers, David Anton Savella, [place]  Office # [phone]    Follow Up:  ADHF    Subjective/Observations: Sitting on the chair, comfortable on RA.  Denies that he has been requiring supplemental O2.   c/o LEE yesterday, however, resolved today.  Denies any chest discomsfort    REVIEW OF SYSTEMS: All other review of systems is negative unless indicated above  PAST MEDICAL & SURGICAL HISTORY:  Coronary artery disease involving native coronary artery of native heart, angina presence unspecified  Persistent atrial fibrillation: wwas on eliquis, stopped due to gi bleed workup underway  Type 2 diabetes mellitus without complication, without long-term current use of insulin  Hyperlipemia  MI, old  Hypertension  Sepsis: sec to prostate bx  S/P coronary artery bypass graft x 3  Pacemaker: checked at pmd/cardio office 1 week ago  Presence of Watchman left atrial appendage closure device  H/O prostate biopsy  History of cholecystectomy  Artificial cardiac pacemaker  Stented coronary artery  H/O coronary angioplasty: 4 stents  S/P CABG x 3    MEDICATIONS  (STANDING):  albuterol/ipratropium for Nebulization. 3 milliLiter(s) Nebulizer every 6 hours  ascorbic acid 500 milliGRAM(s) Oral daily  aspirin  chewable 81 milliGRAM(s) Oral daily  carvedilol 25 milliGRAM(s) Oral every 12 hours  cefuroxime   Tablet 500 milliGRAM(s) Oral every 12 hours  dextrose 5%. 1000 milliLiter(s) (50 mL/Hr) IV Continuous <Continuous>  dextrose 50% Injectable 12.5 Gram(s) IV Push once  dextrose 50% Injectable 25 Gram(s) IV Push once  dextrose 50% Injectable 25 Gram(s) IV Push once  ferrous    sulfate 325 milliGRAM(s) Oral daily  furosemide    Tablet 40 milliGRAM(s) Oral every 12 hours  guaiFENesin  milliGRAM(s) Oral every 12 hours  insulin lispro (HumaLOG) corrective regimen sliding scale   SubCutaneous three times a day before meals  insulin lispro (HumaLOG) corrective regimen sliding scale   SubCutaneous at bedtime  montelukast 10 milliGRAM(s) Oral daily  multivitamin/minerals 1 Tablet(s) Oral daily  pantoprazole    Tablet 40 milliGRAM(s) Oral two times a day  sertraline 50 milliGRAM(s) Oral daily  sodium chloride 3%  Inhalation 3 milliLiter(s) Inhalation every 6 hours  tamsulosin 0.4 milliGRAM(s) Oral two times a day    MEDICATIONS  (PRN):  dextrose 40% Gel 15 Gram(s) Oral once PRN Blood Glucose LESS THAN 70 milliGRAM(s)/deciliter  glucagon  Injectable 1 milliGRAM(s) IntraMuscular once PRN Glucose LESS THAN 70 milligrams/deciliter    Allergies    No Known Allergies    Intolerances    Vital Signs Last 24 Hrs  T(C): 36.8 (26 Feb 2020 07:30), Max: 37.1 (26 Feb 2020 00:26)  T(F): 98.2 (26 Feb 2020 07:30), Max: 98.7 (26 Feb 2020 00:26)  HR: 72 (26 Feb 2020 07:41) (63 - 75)  BP: 127/72 (26 Feb 2020 07:30) (105/62 - 163/75)  BP(mean): --  RR: 16 (26 Feb 2020 07:30) (16 - 20)  SpO2: 94% (26 Feb 2020 07:41) (91% - 95%)  I&O's Summary    25 Feb 2020 07:01  -  26 Feb 2020 07:00  --------------------------------------------------------  IN: 240 mL / OUT: 1825 mL / NET: -1585 mL    26 Feb 2020 07:01  -  26 Feb 2020 09:39  --------------------------------------------------------  IN: 480 mL / OUT: 750 mL / NET: -270 mL     PHYSICAL EXAM:  TELE: Not on tele  Constitutional: NAD, awake and alert, well-developed  HEENT: Moist Mucous Membranes, Anicteric  Pulmonary: Non-labored, breath sounds are clear bilaterally, No wheezing, rales or rhonchi  Cardiovascular: Regular, S1 and S2, No murmurs, rubs, gallops or clicks  Gastrointestinal: Bowel Sounds present, soft, nontender.   Lymph: No peripheral edema. No lymphadenopathy.  Skin: No visible rashes or ulcers.  Psych:  Mood & affect appropriate  LABS: All Labs Reviewed:                        10.2   8.93  )-----------( 191      ( 26 Feb 2020 07:15 )             30.8                         9.8    7.12  )-----------( 178      ( 25 Feb 2020 07:00 )             29.2                         10.6   8.61  )-----------( 181      ( 24 Feb 2020 06:38 )             32.5     26 Feb 2020 07:15    140    |  100    |  55     ----------------------------<  157    3.8     |  31     |  1.40   25 Feb 2020 07:00    139    |  100    |  57     ----------------------------<  147    3.1     |  30     |  1.40   24 Feb 2020 06:38    140    |  100    |  65     ----------------------------<  142    3.5     |  29     |  1.40     Ca    9.7        26 Feb 2020 07:15  Ca    9.3        25 Feb 2020 07:00  Ca    9.5        24 Feb 2020 06:38     < from: TTE Echo Doppler w/o Cont (02.25.20 @ 14:29) >     EXAM:  ECHO TTE WO CON COMP W DOPPLR      PROCEDURE DATE:  02/25/2020      INTERPRETATION:  INDICATION: Edema  Sonographer KL    Blood Pressure 112/65    Height 183 cm     Weight   90.7 kg        BSA 2.13 sq m    Dimensions:    LA 5.2       Normal Values: 2.0 - 4.0 cm    Ao 4.4        Normal Values: 2.0 - 3.8 cm  SEPTUM 1.8       Normal Values: 0.6 - 1.2 cm  PWT 1.4       Normal Values: 0.6 - 1.1 cm  LVIDd 4.4         Normal Values: 3.0 - 5.6 cm  LVIDs 3.3         Normal Values: 1.8 - 4.0cm      OBSERVATIONS:  Technically difficult study  Mitral Valve: normal, mild MR.  Aortic Valve/Aorta: Calcified trileaflet aortic valve with normal opening. Trace AI. Dilated aortic root of 4.4 cm  Tricuspid Valve: normal with trace TR.  Pulmonic Valve: Trace PI  Left Atrium: Enlarged  Right Atrium: Not well-visualized  Left Ventricle: normal LV size with low-normal systolic function, estimated LVEF of 50-55%. LVH  Right Ventricle: Grossly normal size and systolic function. Device wire is notedwithin the right heart  Pericardium/Pleura: normal, no significant pericardial effusion.  Pulmonary/RV Pressure: estimated PA systolic pressure of 19 mmHg     Conclusion:   Technically difficult study  Concentric LVH with low-normal systolic function, estimated LVEF of 50-55%.   Grossly normal RV size and systolic function. Device wire seen within the right heart  Left atrial enlargement  Calcified trileaflet aortic valve, trace AI.   Dilated aortic root at 4.4 cm  Mild MR   Trace TR.    Estimated PA systolic pressure of 19 mmHg.   No significant pericardial effusion.      JORGE L GREENFIELD   This document has been electronically signed. Feb 26 2020  8:57AM      < end of copied text >    < from: CT Chest No Cont (02.20.20 @ 11:13) >    EXAM:  CT CHEST                            PROCEDURE DATE:  02/20/2020          INTERPRETATION:  CLINICAL INFORMATION: Shortness of breath.    COMPARISON: Chest CT 6/28/2013.    PROCEDURE:   CT of the Chest was performed without intravenous contrast.  Sagittal and coronal reformats were performed.      FINDINGS:    LUNGS AND AIRWAYS: Patent central airways.  Diffuse interstitial prominence throughout both lungs. There are patchy and nodular tree-in-bud type opacities throughout both lungs but predominantly in the right lower lobe, likely infectious in etiology. There is diffuse intralobular septal thickening, likely representing pulmonary vascular congestion.    PLEURA: No pleural effusion.    MEDIASTINUM AND TAMIA: No lymphadenopathy.    VESSELS: The thoracic aorta and main pulmonary artery are normal in caliber. There is heavy coronary artery calcification.    HEART: Heart size is enlarged. There are pacemaker leads in the right atrium and right ventricle. There is aortic valvular calcification. A device is present within the left atrial appendage. No pericardial effusion.    CHEST WALL AND LOWER NECK: The thyroid gland is within normal limits. There is no supraclavicular or axillary lymphadenopathy. There is a pacemaker generator pack in the left anterior chest wall with leads through a left subclavian approach.    VISUALIZED UPPER ABDOMEN: The adrenal glands are within normal limits. The imaged portions of the unenhanced liver, spleen, are within normal limits. The pancreas is atrophic. What likely represents cholecystectomy clips are noted in the gallbladder fossa. Small hiatal hernia.    BONES: Multilevel degenerative change of the thoracolumbar spine with osteophytes, degenerative disc disease and facet joint arthropathy. There are median sternotomy wires.    IMPRESSION:     Pulmonary vascular congestion.  Patchy and nodular opacities in both lungs, particularly in the right lower lobe, likely reflecting pneumonia.    SHEY PORTILLO M.D. ATTENDING RADIOLOGIST  This document has been electronically signed. Feb 20 2020 11:31AM     < end of copied text >    < from: 12 Lead ECG (02.20.20 @ 08:24) >    Ventricular Rate 83 BPM    Atrial Rate 85 BPM    P-R Interval 196 ms    QRS Duration 200 ms    Q-T Interval 476 ms    QTC Calculation(Bezet) 559 ms    R Axis -50 degrees    T Axis 120 degrees    Diagnosis Line AV dual-paced rhythm with frequent premature ventricular complexes  Abnormal ECG  When compared with ECG of 28-JUN-2013 21:25,  premature ventricular complexes are now present  Vent. rate has increased BY  23 BPM  Confirmed by BERT ZAVALA ([id number]) on 2/20/2020 3:06:14 PM    < end of copied text >

## 2020-02-26 NOTE — PROGRESS NOTE ADULT - SUBJECTIVE AND OBJECTIVE BOX
Date/Time Patient Seen:  		  Referring MD:   Data Reviewed	       Patient is a 88y old  Male who presents with a chief complaint of shortness of breath (25 Feb 2020 12:45)      Subjective/HPI     PAST MEDICAL & SURGICAL HISTORY:  Coronary artery disease involving native coronary artery of native heart, angina presence unspecified  Persistent atrial fibrillation: wwas on eliquis, stopped due to gi bleed workup underway  Type 2 diabetes mellitus without complication, without long-term current use of insulin  Hyperlipemia  MI, old  Hypertension  Sepsis: sec to prostate bx  S/P coronary artery bypass graft x 3  Pacemaker: checked at pmd/cardio office 1 week ago  Presence of Watchman left atrial appendage closure device  H/O prostate biopsy  History of cholecystectomy  Artificial cardiac pacemaker  Stented coronary artery  H/O coronary angioplasty: 4 stents  S/P CABG x 3  No significant past surgical history        Medication list         MEDICATIONS  (STANDING):  albuterol/ipratropium for Nebulization. 3 milliLiter(s) Nebulizer every 6 hours  ascorbic acid 500 milliGRAM(s) Oral daily  aspirin  chewable 81 milliGRAM(s) Oral daily  carvedilol 25 milliGRAM(s) Oral every 12 hours  cefuroxime   Tablet 500 milliGRAM(s) Oral every 12 hours  dextrose 5%. 1000 milliLiter(s) (50 mL/Hr) IV Continuous <Continuous>  dextrose 50% Injectable 12.5 Gram(s) IV Push once  dextrose 50% Injectable 25 Gram(s) IV Push once  dextrose 50% Injectable 25 Gram(s) IV Push once  ferrous    sulfate 325 milliGRAM(s) Oral daily  furosemide   Injectable 40 milliGRAM(s) IV Push every 12 hours  guaiFENesin  milliGRAM(s) Oral every 12 hours  insulin lispro (HumaLOG) corrective regimen sliding scale   SubCutaneous three times a day before meals  insulin lispro (HumaLOG) corrective regimen sliding scale   SubCutaneous at bedtime  montelukast 10 milliGRAM(s) Oral daily  multivitamin/minerals 1 Tablet(s) Oral daily  pantoprazole    Tablet 40 milliGRAM(s) Oral two times a day  sertraline 50 milliGRAM(s) Oral daily  sodium chloride 3%  Inhalation 3 milliLiter(s) Inhalation every 6 hours  tamsulosin 0.4 milliGRAM(s) Oral two times a day    MEDICATIONS  (PRN):  dextrose 40% Gel 15 Gram(s) Oral once PRN Blood Glucose LESS THAN 70 milliGRAM(s)/deciliter  glucagon  Injectable 1 milliGRAM(s) IntraMuscular once PRN Glucose LESS THAN 70 milligrams/deciliter         Vitals log        ICU Vital Signs Last 24 Hrs  T(C): 36.8 (26 Feb 2020 07:30), Max: 37.1 (26 Feb 2020 00:26)  T(F): 98.2 (26 Feb 2020 07:30), Max: 98.7 (26 Feb 2020 00:26)  HR: 72 (26 Feb 2020 07:41) (63 - 75)  BP: 127/72 (26 Feb 2020 07:30) (105/62 - 163/75)  BP(mean): --  ABP: --  ABP(mean): --  RR: 16 (26 Feb 2020 07:30) (16 - 24)  SpO2: 94% (26 Feb 2020 07:41) (91% - 95%)           Input and Output:  I&O's Detail    25 Feb 2020 07:01  -  26 Feb 2020 07:00  --------------------------------------------------------  IN:    Oral Fluid: 240 mL  Total IN: 240 mL    OUT:    Voided: 1825 mL  Total OUT: 1825 mL    Total NET: -1585 mL          Lab Data                        10.2   8.93  )-----------( 191      ( 26 Feb 2020 07:15 )             30.8     02-26    140  |  100  |  55<H>  ----------------------------<  157<H>  3.8   |  31  |  1.40<H>    Ca    9.7      26 Feb 2020 07:15              Review of Systems	      Objective     Physical Examination    heart s1s2  lung dec BS  abd soft  head nc  head at  on room air  verbal      Pertinent Lab findings & Imaging      Sarah:  NO   Adequate UO     I&O's Detail    25 Feb 2020 07:01  -  26 Feb 2020 07:00  --------------------------------------------------------  IN:    Oral Fluid: 240 mL  Total IN: 240 mL    OUT:    Voided: 1825 mL  Total OUT: 1825 mL    Total NET: -1585 mL               Discussed with:     Cultures:	        Radiology

## 2020-02-27 ENCOUNTER — APPOINTMENT (OUTPATIENT)
Dept: CARE COORDINATION | Facility: HOME HEALTH | Age: 85
End: 2020-02-27

## 2020-02-27 PROBLEM — I48.1 PERSISTENT ATRIAL FIBRILLATION: Chronic | Status: ACTIVE | Noted: 2017-08-23

## 2020-02-28 ENCOUNTER — APPOINTMENT (OUTPATIENT)
Dept: CARE COORDINATION | Facility: HOME HEALTH | Age: 85
End: 2020-02-28
Payer: MEDICARE

## 2020-02-28 VITALS
DIASTOLIC BLOOD PRESSURE: 70 MMHG | HEART RATE: 71 BPM | RESPIRATION RATE: 17 BRPM | OXYGEN SATURATION: 96 % | SYSTOLIC BLOOD PRESSURE: 110 MMHG

## 2020-02-28 DIAGNOSIS — R73.9 HYPERGLYCEMIA, UNSPECIFIED: ICD-10-CM

## 2020-02-28 DIAGNOSIS — I10 ESSENTIAL (PRIMARY) HYPERTENSION: ICD-10-CM

## 2020-02-28 DIAGNOSIS — E78.5 HYPERLIPIDEMIA, UNSPECIFIED: ICD-10-CM

## 2020-02-28 DIAGNOSIS — J18.9 PNEUMONIA, UNSPECIFIED ORGANISM: ICD-10-CM

## 2020-02-28 PROCEDURE — 99348 HOME/RES VST EST LOW MDM 30: CPT

## 2020-02-28 RX ORDER — EMPAGLIFLOZIN 10 MG/1
10 TABLET, FILM COATED ORAL DAILY
Qty: 30 | Refills: 0 | Status: DISCONTINUED | COMMUNITY
Start: 2017-05-19 | End: 2020-02-28

## 2020-02-28 RX ORDER — VALSARTAN 80 MG/1
80 TABLET, COATED ORAL DAILY
Refills: 0 | Status: ACTIVE | COMMUNITY
Start: 2020-02-28

## 2020-02-28 RX ORDER — ICOSAPENT ETHYL 1000 MG/1
1 CAPSULE ORAL
Qty: 30 | Refills: 0 | Status: DISCONTINUED | COMMUNITY
Start: 2017-05-19 | End: 2020-02-28

## 2020-02-28 RX ORDER — RAMIPRIL 10 MG/1
10 CAPSULE ORAL DAILY
Qty: 30 | Refills: 0 | Status: DISCONTINUED | COMMUNITY
Start: 2017-05-19 | End: 2020-02-28

## 2020-02-28 RX ORDER — ASPIRIN ENTERIC COATED TABLETS 81 MG 81 MG/1
81 TABLET, DELAYED RELEASE ORAL DAILY
Refills: 0 | Status: ACTIVE | COMMUNITY
Start: 2020-02-28

## 2020-02-28 RX ORDER — PRASUGREL HYDROCHLORIDE 5 MG/1
5 TABLET, COATED ORAL DAILY
Refills: 0 | Status: DISCONTINUED | COMMUNITY
Start: 2017-10-17 | End: 2020-02-28

## 2020-02-28 RX ORDER — CARVEDILOL 25 MG/1
25 TABLET, FILM COATED ORAL TWICE DAILY
Refills: 0 | Status: ACTIVE | COMMUNITY
Start: 2020-02-28

## 2020-02-28 RX ORDER — METFORMIN HYDROCHLORIDE 500 MG/1
500 TABLET, COATED ORAL
Refills: 0 | Status: ACTIVE | COMMUNITY
Start: 2020-02-28

## 2020-02-28 RX ORDER — FERROUS FUMARATE 324(106)MG
324 (106 FE) TABLET ORAL
Refills: 0 | Status: ACTIVE | COMMUNITY
Start: 2020-02-28

## 2020-02-28 RX ORDER — MONTELUKAST SODIUM 10 MG/1
10 TABLET, FILM COATED ORAL
Refills: 0 | Status: ACTIVE | COMMUNITY
Start: 2020-02-28

## 2020-02-28 RX ORDER — METOPROLOL SUCCINATE 25 MG/1
25 TABLET, EXTENDED RELEASE ORAL DAILY
Qty: 30 | Refills: 0 | Status: DISCONTINUED | COMMUNITY
Start: 2017-05-19 | End: 2020-02-28

## 2020-02-28 RX ORDER — SERTRALINE HYDROCHLORIDE 50 MG/1
50 TABLET, FILM COATED ORAL DAILY
Qty: 45 | Refills: 0 | Status: ACTIVE | COMMUNITY
Start: 2017-05-19

## 2020-02-28 RX ORDER — MULTIVIT-MIN/FOLIC/VIT K/LYCOP 400-300MCG
25 MCG TABLET ORAL DAILY
Qty: 30 | Refills: 0 | Status: DISCONTINUED | COMMUNITY
Start: 2017-05-19 | End: 2020-02-28

## 2020-02-28 RX ORDER — GLYCOPYRROLATE AND FORMOTEROL FUMARATE 9; 4.8 UG/1; UG/1
9-4.8 AEROSOL, METERED RESPIRATORY (INHALATION)
Qty: 1 | Refills: 0 | Status: DISCONTINUED | COMMUNITY
Start: 2017-05-19 | End: 2020-02-28

## 2020-02-28 RX ORDER — FLUTICASONE FUROATE AND VILANTEROL TRIFENATATE 100; 25 UG/1; UG/1
100-25 POWDER RESPIRATORY (INHALATION) DAILY
Refills: 0 | Status: ACTIVE | COMMUNITY
Start: 2020-02-28

## 2020-02-28 RX ORDER — RIBOFLAVIN (VITAMIN B2) 100 MG
100 TABLET ORAL DAILY
Refills: 0 | Status: ACTIVE | COMMUNITY
Start: 2020-02-28

## 2020-02-28 RX ORDER — PANTOPRAZOLE 40 MG/1
40 TABLET, DELAYED RELEASE ORAL DAILY
Qty: 30 | Refills: 0 | Status: ACTIVE | COMMUNITY
Start: 2020-02-28

## 2020-02-28 RX ORDER — OMEGA-3/DHA/EPA/FISH OIL 60 MG-90MG
500 CAPSULE ORAL
Refills: 0 | Status: ACTIVE | COMMUNITY
Start: 2020-02-28

## 2020-02-28 NOTE — PHYSICAL EXAM
[Pedal Pulses Present] : the pedal pulses are present [No Edema] : there was no peripheral edema [Normal] : soft, non-tender, non-distended, no masses palpated, no HSM and normal bowel sounds [de-identified] : +wheezing noted to b/l bases on inspiration

## 2020-02-28 NOTE — PLAN
[FreeTextEntry1] : Reminded of CN/NP role and yellow card, advised to call with any questions/concerns, verbalized understanding

## 2020-02-28 NOTE — HISTORY OF PRESENT ILLNESS
[Family Member] : family member [Formal Caregiver] : formal caregiver [FreeTextEntry1] : f/u hospitalization [de-identified] : 87 yo M with pmh chronic afib was on eliquis but stopped 2 weeks ago due to gi bleed, htn. hld, CABG x3, CAD, sepsis sec to prostate bx, DM2, prev MI, PPM recently checked at cardio/pmd off states was ok who p/w with 4 days of worsening weakness, cough with yellowish sputum production and minimal improvement despite being rx doxycycline 3 days ago. Pt also has some shortness of breath and worsening wheezing. Patient admitted for PNA.  PAtient d/c home with no skilled needs.  Patient denies CP, SOB, dizziness, lightheadedness, fever, chills, n/v.  PAtient reports good appetite. Reports productive white/clear coughing at times.\par \par HTN/HLD: stable no new issues\par hyperglycemia: stable \par

## 2020-03-28 ENCOUNTER — INPATIENT (INPATIENT)
Facility: HOSPITAL | Age: 85
LOS: 1 days | Discharge: HOME | DRG: 865 | End: 2020-03-30
Attending: STUDENT IN AN ORGANIZED HEALTH CARE EDUCATION/TRAINING PROGRAM | Admitting: STUDENT IN AN ORGANIZED HEALTH CARE EDUCATION/TRAINING PROGRAM
Payer: MEDICARE

## 2020-03-28 VITALS
RESPIRATION RATE: 26 BRPM | TEMPERATURE: 97 F | HEART RATE: 92 BPM | SYSTOLIC BLOOD PRESSURE: 117 MMHG | DIASTOLIC BLOOD PRESSURE: 59 MMHG | HEIGHT: 69 IN | OXYGEN SATURATION: 95 % | WEIGHT: 199.96 LBS

## 2020-03-28 DIAGNOSIS — Z95.5 PRESENCE OF CORONARY ANGIOPLASTY IMPLANT AND GRAFT: Chronic | ICD-10-CM

## 2020-03-28 DIAGNOSIS — I48.19 OTHER PERSISTENT ATRIAL FIBRILLATION: ICD-10-CM

## 2020-03-28 DIAGNOSIS — I25.10 ATHEROSCLEROTIC HEART DISEASE OF NATIVE CORONARY ARTERY WITHOUT ANGINA PECTORIS: ICD-10-CM

## 2020-03-28 DIAGNOSIS — Z95.818 PRESENCE OF OTHER CARDIAC IMPLANTS AND GRAFTS: Chronic | ICD-10-CM

## 2020-03-28 DIAGNOSIS — E11.9 TYPE 2 DIABETES MELLITUS WITHOUT COMPLICATIONS: ICD-10-CM

## 2020-03-28 DIAGNOSIS — Z95.0 PRESENCE OF CARDIAC PACEMAKER: Chronic | ICD-10-CM

## 2020-03-28 DIAGNOSIS — Z98.61 CORONARY ANGIOPLASTY STATUS: Chronic | ICD-10-CM

## 2020-03-28 DIAGNOSIS — Z98.890 OTHER SPECIFIED POSTPROCEDURAL STATES: Chronic | ICD-10-CM

## 2020-03-28 DIAGNOSIS — I10 ESSENTIAL (PRIMARY) HYPERTENSION: ICD-10-CM

## 2020-03-28 DIAGNOSIS — R06.02 SHORTNESS OF BREATH: ICD-10-CM

## 2020-03-28 DIAGNOSIS — E78.5 HYPERLIPIDEMIA, UNSPECIFIED: ICD-10-CM

## 2020-03-28 DIAGNOSIS — Z95.1 PRESENCE OF AORTOCORONARY BYPASS GRAFT: Chronic | ICD-10-CM

## 2020-03-28 DIAGNOSIS — N17.9 ACUTE KIDNEY FAILURE, UNSPECIFIED: ICD-10-CM

## 2020-03-28 DIAGNOSIS — A41.9 SEPSIS, UNSPECIFIED ORGANISM: ICD-10-CM

## 2020-03-28 DIAGNOSIS — Z29.9 ENCOUNTER FOR PROPHYLACTIC MEASURES, UNSPECIFIED: ICD-10-CM

## 2020-03-28 DIAGNOSIS — R55 SYNCOPE AND COLLAPSE: ICD-10-CM

## 2020-03-28 DIAGNOSIS — Z90.49 ACQUIRED ABSENCE OF OTHER SPECIFIED PARTS OF DIGESTIVE TRACT: Chronic | ICD-10-CM

## 2020-03-28 LAB
ALBUMIN SERPL ELPH-MCNC: 3.4 G/DL — SIGNIFICANT CHANGE UP (ref 3.3–5)
ALP SERPL-CCNC: 66 U/L — SIGNIFICANT CHANGE UP (ref 40–120)
ALT FLD-CCNC: 51 U/L — SIGNIFICANT CHANGE UP (ref 12–78)
ANION GAP SERPL CALC-SCNC: 9 MMOL/L — SIGNIFICANT CHANGE UP (ref 5–17)
APPEARANCE UR: CLEAR — SIGNIFICANT CHANGE UP
APTT BLD: 29.7 SEC — SIGNIFICANT CHANGE UP (ref 28.5–37)
AST SERPL-CCNC: 114 U/L — HIGH (ref 15–37)
BASOPHILS # BLD AUTO: 0.05 K/UL — SIGNIFICANT CHANGE UP (ref 0–0.2)
BASOPHILS NFR BLD AUTO: 0.6 % — SIGNIFICANT CHANGE UP (ref 0–2)
BILIRUB SERPL-MCNC: 0.5 MG/DL — SIGNIFICANT CHANGE UP (ref 0.2–1.2)
BILIRUB UR-MCNC: NEGATIVE — SIGNIFICANT CHANGE UP
BUN SERPL-MCNC: 28 MG/DL — HIGH (ref 7–23)
CALCIUM SERPL-MCNC: 9.2 MG/DL — SIGNIFICANT CHANGE UP (ref 8.5–10.1)
CHLORIDE SERPL-SCNC: 105 MMOL/L — SIGNIFICANT CHANGE UP (ref 96–108)
CK SERPL-CCNC: 40 U/L — SIGNIFICANT CHANGE UP (ref 26–308)
CO2 SERPL-SCNC: 26 MMOL/L — SIGNIFICANT CHANGE UP (ref 22–31)
COLOR SPEC: YELLOW — SIGNIFICANT CHANGE UP
CREAT SERPL-MCNC: 1.6 MG/DL — HIGH (ref 0.5–1.3)
DIFF PNL FLD: NEGATIVE — SIGNIFICANT CHANGE UP
EOSINOPHIL # BLD AUTO: 0.12 K/UL — SIGNIFICANT CHANGE UP (ref 0–0.5)
EOSINOPHIL NFR BLD AUTO: 1.5 % — SIGNIFICANT CHANGE UP (ref 0–6)
GLUCOSE SERPL-MCNC: 182 MG/DL — HIGH (ref 70–99)
GLUCOSE UR QL: NEGATIVE — SIGNIFICANT CHANGE UP
HCT VFR BLD CALC: 30.2 % — LOW (ref 39–50)
HGB BLD-MCNC: 9.5 G/DL — LOW (ref 13–17)
IMM GRANULOCYTES NFR BLD AUTO: 1 % — SIGNIFICANT CHANGE UP (ref 0–1.5)
INR BLD: 1.17 RATIO — HIGH (ref 0.88–1.16)
KETONES UR-MCNC: NEGATIVE — SIGNIFICANT CHANGE UP
LACTATE SERPL-SCNC: 2.6 MMOL/L — HIGH (ref 0.7–2)
LACTATE SERPL-SCNC: 4.3 MMOL/L — CRITICAL HIGH (ref 0.7–2)
LEUKOCYTE ESTERASE UR-ACNC: NEGATIVE — SIGNIFICANT CHANGE UP
LYMPHOCYTES # BLD AUTO: 0.88 K/UL — LOW (ref 1–3.3)
LYMPHOCYTES # BLD AUTO: 11.2 % — LOW (ref 13–44)
MCHC RBC-ENTMCNC: 29 PG — SIGNIFICANT CHANGE UP (ref 27–34)
MCHC RBC-ENTMCNC: 31.5 GM/DL — LOW (ref 32–36)
MCV RBC AUTO: 92.1 FL — SIGNIFICANT CHANGE UP (ref 80–100)
MONOCYTES # BLD AUTO: 0.7 K/UL — SIGNIFICANT CHANGE UP (ref 0–0.9)
MONOCYTES NFR BLD AUTO: 8.9 % — SIGNIFICANT CHANGE UP (ref 2–14)
NEUTROPHILS # BLD AUTO: 6.02 K/UL — SIGNIFICANT CHANGE UP (ref 1.8–7.4)
NEUTROPHILS NFR BLD AUTO: 76.8 % — SIGNIFICANT CHANGE UP (ref 43–77)
NITRITE UR-MCNC: NEGATIVE — SIGNIFICANT CHANGE UP
NRBC # BLD: 0 /100 WBCS — SIGNIFICANT CHANGE UP (ref 0–0)
NT-PROBNP SERPL-SCNC: 4219 PG/ML — HIGH (ref 0–450)
PH UR: 5 — SIGNIFICANT CHANGE UP (ref 5–8)
PLATELET # BLD AUTO: 193 K/UL — SIGNIFICANT CHANGE UP (ref 150–400)
POTASSIUM SERPL-MCNC: 4.7 MMOL/L — SIGNIFICANT CHANGE UP (ref 3.5–5.3)
POTASSIUM SERPL-SCNC: 4.7 MMOL/L — SIGNIFICANT CHANGE UP (ref 3.5–5.3)
PROT SERPL-MCNC: 7.5 G/DL — SIGNIFICANT CHANGE UP (ref 6–8.3)
PROT UR-MCNC: 100
PROTHROM AB SERPL-ACNC: 13.2 SEC — HIGH (ref 10–12.9)
RAPID RVP RESULT: SIGNIFICANT CHANGE UP
RBC # BLD: 3.28 M/UL — LOW (ref 4.2–5.8)
RBC # FLD: 14.7 % — HIGH (ref 10.3–14.5)
SODIUM SERPL-SCNC: 140 MMOL/L — SIGNIFICANT CHANGE UP (ref 135–145)
SP GR SPEC: 1.01 — SIGNIFICANT CHANGE UP (ref 1.01–1.02)
TROPONIN I SERPL-MCNC: 0.03 NG/ML — SIGNIFICANT CHANGE UP (ref 0.01–0.04)
UROBILINOGEN FLD QL: NEGATIVE — SIGNIFICANT CHANGE UP
WBC # BLD: 7.85 K/UL — SIGNIFICANT CHANGE UP (ref 3.8–10.5)
WBC # FLD AUTO: 7.85 K/UL — SIGNIFICANT CHANGE UP (ref 3.8–10.5)

## 2020-03-28 PROCEDURE — 99285 EMERGENCY DEPT VISIT HI MDM: CPT

## 2020-03-28 PROCEDURE — 99223 1ST HOSP IP/OBS HIGH 75: CPT | Mod: GC,AI

## 2020-03-28 PROCEDURE — 71045 X-RAY EXAM CHEST 1 VIEW: CPT | Mod: 26

## 2020-03-28 PROCEDURE — 70450 CT HEAD/BRAIN W/O DYE: CPT | Mod: 26

## 2020-03-28 PROCEDURE — 93010 ELECTROCARDIOGRAM REPORT: CPT

## 2020-03-28 PROCEDURE — 72125 CT NECK SPINE W/O DYE: CPT | Mod: 26

## 2020-03-28 RX ORDER — ASPIRIN/CALCIUM CARB/MAGNESIUM 324 MG
81 TABLET ORAL DAILY
Refills: 0 | Status: DISCONTINUED | OUTPATIENT
Start: 2020-03-28 | End: 2020-03-30

## 2020-03-28 RX ORDER — MONTELUKAST 4 MG/1
1 TABLET, CHEWABLE ORAL
Qty: 0 | Refills: 0 | DISCHARGE

## 2020-03-28 RX ORDER — CARVEDILOL PHOSPHATE 80 MG/1
1 CAPSULE, EXTENDED RELEASE ORAL
Qty: 0 | Refills: 0 | DISCHARGE

## 2020-03-28 RX ORDER — ACETAMINOPHEN 500 MG
650 TABLET ORAL ONCE
Refills: 0 | Status: COMPLETED | OUTPATIENT
Start: 2020-03-28 | End: 2020-03-28

## 2020-03-28 RX ORDER — NITROGLYCERIN 6.5 MG
1 CAPSULE, EXTENDED RELEASE ORAL
Qty: 0 | Refills: 0 | DISCHARGE

## 2020-03-28 RX ORDER — FERROUS SULFATE 325(65) MG
1 TABLET ORAL
Qty: 0 | Refills: 0 | DISCHARGE

## 2020-03-28 RX ORDER — CARVEDILOL PHOSPHATE 80 MG/1
12.5 CAPSULE, EXTENDED RELEASE ORAL EVERY 12 HOURS
Refills: 0 | Status: DISCONTINUED | OUTPATIENT
Start: 2020-03-28 | End: 2020-03-30

## 2020-03-28 RX ORDER — DEXTROSE 50 % IN WATER 50 %
25 SYRINGE (ML) INTRAVENOUS ONCE
Refills: 0 | Status: DISCONTINUED | OUTPATIENT
Start: 2020-03-28 | End: 2020-03-30

## 2020-03-28 RX ORDER — INSULIN LISPRO 100/ML
VIAL (ML) SUBCUTANEOUS
Refills: 0 | Status: DISCONTINUED | OUTPATIENT
Start: 2020-03-28 | End: 2020-03-30

## 2020-03-28 RX ORDER — SERTRALINE 25 MG/1
1.5 TABLET, FILM COATED ORAL
Qty: 0 | Refills: 0 | DISCHARGE

## 2020-03-28 RX ORDER — FERROUS SULFATE 325(65) MG
325 TABLET ORAL DAILY
Refills: 0 | Status: DISCONTINUED | OUTPATIENT
Start: 2020-03-28 | End: 2020-03-30

## 2020-03-28 RX ORDER — MULTIVIT-MIN/FERROUS GLUCONATE 9 MG/15 ML
1 LIQUID (ML) ORAL
Qty: 0 | Refills: 0 | DISCHARGE

## 2020-03-28 RX ORDER — PANTOPRAZOLE SODIUM 20 MG/1
1 TABLET, DELAYED RELEASE ORAL
Qty: 0 | Refills: 0 | DISCHARGE

## 2020-03-28 RX ORDER — METFORMIN HYDROCHLORIDE 850 MG/1
1 TABLET ORAL
Qty: 0 | Refills: 0 | DISCHARGE

## 2020-03-28 RX ORDER — ASCORBIC ACID 60 MG
1 TABLET,CHEWABLE ORAL
Qty: 0 | Refills: 0 | DISCHARGE

## 2020-03-28 RX ORDER — PANTOPRAZOLE SODIUM 20 MG/1
40 TABLET, DELAYED RELEASE ORAL
Refills: 0 | Status: DISCONTINUED | OUTPATIENT
Start: 2020-03-28 | End: 2020-03-30

## 2020-03-28 RX ORDER — ROSUVASTATIN CALCIUM 5 MG/1
1 TABLET ORAL
Qty: 0 | Refills: 0 | DISCHARGE

## 2020-03-28 RX ORDER — DEXTROSE 50 % IN WATER 50 %
12.5 SYRINGE (ML) INTRAVENOUS ONCE
Refills: 0 | Status: DISCONTINUED | OUTPATIENT
Start: 2020-03-28 | End: 2020-03-30

## 2020-03-28 RX ORDER — SERTRALINE 25 MG/1
75 TABLET, FILM COATED ORAL DAILY
Refills: 0 | Status: DISCONTINUED | OUTPATIENT
Start: 2020-03-28 | End: 2020-03-30

## 2020-03-28 RX ORDER — TAMSULOSIN HYDROCHLORIDE 0.4 MG/1
1 CAPSULE ORAL
Qty: 0 | Refills: 0 | DISCHARGE

## 2020-03-28 RX ORDER — FUROSEMIDE 40 MG
1 TABLET ORAL
Qty: 0 | Refills: 0 | DISCHARGE

## 2020-03-28 RX ORDER — VALSARTAN 80 MG/1
1 TABLET ORAL
Qty: 0 | Refills: 0 | DISCHARGE

## 2020-03-28 RX ORDER — BUDESONIDE AND FORMOTEROL FUMARATE DIHYDRATE 160; 4.5 UG/1; UG/1
2 AEROSOL RESPIRATORY (INHALATION)
Refills: 0 | Status: DISCONTINUED | OUTPATIENT
Start: 2020-03-28 | End: 2020-03-30

## 2020-03-28 RX ORDER — GLUCAGON INJECTION, SOLUTION 0.5 MG/.1ML
1 INJECTION, SOLUTION SUBCUTANEOUS ONCE
Refills: 0 | Status: DISCONTINUED | OUTPATIENT
Start: 2020-03-28 | End: 2020-03-30

## 2020-03-28 RX ORDER — DEXTROSE 50 % IN WATER 50 %
15 SYRINGE (ML) INTRAVENOUS ONCE
Refills: 0 | Status: DISCONTINUED | OUTPATIENT
Start: 2020-03-28 | End: 2020-03-30

## 2020-03-28 RX ORDER — ATORVASTATIN CALCIUM 80 MG/1
80 TABLET, FILM COATED ORAL AT BEDTIME
Refills: 0 | Status: DISCONTINUED | OUTPATIENT
Start: 2020-03-28 | End: 2020-03-30

## 2020-03-28 RX ORDER — ALBUTEROL 90 UG/1
2 AEROSOL, METERED ORAL EVERY 6 HOURS
Refills: 0 | Status: DISCONTINUED | OUTPATIENT
Start: 2020-03-28 | End: 2020-03-30

## 2020-03-28 RX ORDER — ALBUTEROL 90 UG/1
2 AEROSOL, METERED ORAL
Qty: 0 | Refills: 0 | DISCHARGE

## 2020-03-28 RX ORDER — TAMSULOSIN HYDROCHLORIDE 0.4 MG/1
0.8 CAPSULE ORAL AT BEDTIME
Refills: 0 | Status: DISCONTINUED | OUTPATIENT
Start: 2020-03-28 | End: 2020-03-30

## 2020-03-28 RX ORDER — SODIUM CHLORIDE 9 MG/ML
1000 INJECTION, SOLUTION INTRAVENOUS
Refills: 0 | Status: DISCONTINUED | OUTPATIENT
Start: 2020-03-28 | End: 2020-03-30

## 2020-03-28 RX ORDER — SODIUM CHLORIDE 9 MG/ML
1000 INJECTION INTRAMUSCULAR; INTRAVENOUS; SUBCUTANEOUS
Refills: 0 | Status: COMPLETED | OUTPATIENT
Start: 2020-03-28 | End: 2020-03-28

## 2020-03-28 RX ORDER — MONTELUKAST 4 MG/1
10 TABLET, CHEWABLE ORAL DAILY
Refills: 0 | Status: DISCONTINUED | OUTPATIENT
Start: 2020-03-28 | End: 2020-03-30

## 2020-03-28 RX ADMIN — Medication 650 MILLIGRAM(S): at 23:53

## 2020-03-28 RX ADMIN — SODIUM CHLORIDE 1000 MILLILITER(S): 9 INJECTION INTRAMUSCULAR; INTRAVENOUS; SUBCUTANEOUS at 21:15

## 2020-03-28 RX ADMIN — SODIUM CHLORIDE 1000 MILLILITER(S): 9 INJECTION INTRAMUSCULAR; INTRAVENOUS; SUBCUTANEOUS at 21:10

## 2020-03-28 RX ADMIN — Medication 650 MILLIGRAM(S): at 22:59

## 2020-03-28 RX ADMIN — SODIUM CHLORIDE 1000 MILLILITER(S): 9 INJECTION INTRAMUSCULAR; INTRAVENOUS; SUBCUTANEOUS at 20:10

## 2020-03-28 NOTE — ED PROVIDER NOTE - PROGRESS NOTE DETAILS
Dw EMS - states pt was unresponsive at scene upon arrival, bystander CPR but EMS did not find pt in arrest. PT was bagged due to hypoxia and then awoke to baseline. Interrogated pts ppm ([employer]) Feliz Smith, agree with admission plan. Feliz Weiner, will see pt to admit. Pt doing well, no acute events. PPM report in chart

## 2020-03-28 NOTE — ED PROVIDER NOTE - NSCAREINITIATED _GEN_ER
Javier Cabrera(Attending) Patient with one or more new problems requiring additional work-up/treatment.

## 2020-03-28 NOTE — H&P ADULT - PROBLEM SELECTOR PLAN 7
- BG mildly elevated on admission  - Hold home antidiabetic agents  - Start LDISS with hypoglycemia protocol and accu checks  - Cons carb/DASH diet  - F/u A1c

## 2020-03-28 NOTE — ED ADULT NURSE NOTE - NSIMPLEMENTINTERV_GEN_ALL_ED
Implemented All Fall with Harm Risk Interventions:  [place] to call system. Call bell, personal items and telephone within reach. Instruct patient to call for assistance. Room bathroom lighting operational. Non-slip footwear when patient is off stretcher. Physically safe environment: no spills, clutter or unnecessary equipment. Stretcher in lowest position, wheels locked, appropriate side rails in place. Provide visual cue, wrist band, yellow gown, etc. Monitor gait and stability. Monitor for mental status changes and reorient to person, place, and time. Review medications for side effects contributing to fall risk. Reinforce activity limits and safety measures with patient and family. Provide visual clues: red socks.

## 2020-03-28 NOTE — ED PROVIDER NOTE - CHPI ED SYMPTOMS NEG
no rash/no vomiting/no decreased eating/drinking/no chills/no diarrhea/no abdominal pain/no fever/no headache

## 2020-03-28 NOTE — ED PROVIDER NOTE - PSH
Artificial cardiac pacemaker    H/O coronary angioplasty  4 stents  H/O prostate biopsy    History of cholecystectomy    Presence of Watchman left atrial appendage closure device    S/P CABG x 3    Stented coronary artery

## 2020-03-28 NOTE — ED ADULT NURSE NOTE - OBJECTIVE STATEMENT
Pt. brought to ED via EMS from home for difficulty breathing. Per patient he was feeling walking and sustained a fall when walking in bedroom, unable to recall detail after. Pt. reported weakness with intermittent diarrhea x days. Pt. complaining of chest pain when moving at this time. Pt. awake and alert upon arrival to ED, placed on O2 3lpm via nasal cannula. Pt. with redness to right chest wall, skin tear to right forearm, bilateral knee and right shin abrasion. Pt. stated he was recently discharged from hospital for PNA. Pt. brought to ED via EMS from home for difficulty breathing. Per patient he was feeling walking and sustained a fall when walking in bedroom, unable to recall detail after. Pt. reported weakness with intermittent diarrhea x days. Pt. complaining of chest pain when moving at this time. Pt. awake and alert upon arrival to ED, placed on O2 3lpm via nasal cannula. Pt. with redness to right chest wall, skin tear to right forearm, bilateral knee and right shin abrasion. Pt. stated he was recently discharged from hospital for PNA. Pt. with nitroglycerine patches in place to bilateral shoulders.

## 2020-03-28 NOTE — ED PROVIDER NOTE - PMH
Coronary artery disease involving native coronary artery of native heart, angina presence unspecified    Hyperlipemia    Hypertension    MI, old    Pacemaker  checked at pmd/cardio office 1 week ago  Persistent atrial fibrillation  wwas on eliquis, stopped due to gi bleed workup underway  S/P coronary artery bypass graft x 3    Sepsis  sec to prostate bx  Type 2 diabetes mellitus without complication, without long-term current use of insulin

## 2020-03-28 NOTE — H&P ADULT - PROBLEM SELECTOR PLAN 6
- Chronic  - Currently c/o pleuritic chest pain though EKG showing _____  - Nitro patch? - Chronic  - Currently c/o pleuritic chest pain though EKG not showing signs of active ischemia  - Nitro patch? - Chronic  - Currently c/o pleuritic chest pain though EKG not showing signs of active ischemia, chest pain likely 2/2 reported chest compressions performed at home  - Will initiate heparin gtt to cover for possible ACS vs PE

## 2020-03-28 NOTE — H&P ADULT - HISTORY OF PRESENT ILLNESS
CHARTING IN PROGRESS    89yo M, with PMH/o CAD s/p CABG x3 (30 yrs ago), s/p 4 stents placed (10 yrs ago), s/p PPM ([employer]), HTN, HLD, atrial fibrillation (not on AC d/t h/o GIB) s/p Watchman device placement (2017), and non-insulin dependent T2DM, c/o weakness that caused him to fall to his knees today.    In the ED  VS: T 96.8 HR 92 /59 RR 26 SpO2 95% on 3L NC ->97% on 2L NC  Significant labs include lactate 4.3, H/H 9.5/30.2, lymphocytes 0.88, NLR 6.8, BUN/Cr 28/1.6, glucose 182, .  CXR: based on my preliminary read with comparison to CXR on 2/20, there appears to be a new LLL opacification with interval improvement of R-sided infiltrate  CT head: No gross acute intracranial hemorrhage, mass effect, or acute osseous fracture. 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  CT Cspine: No acute cervical spine fracture or evidence of traumatic malalignment. Cervical spondylosis.  EKG:    Patient received 2L NS. 87yo M, with PMH/o CAD s/p CABG x3 (30 yrs ago), s/p 4 stents placed (10 yrs ago), s/p PPM ([employer]), HTN, HLD, atrial fibrillation (on Eliquis) s/p Watchman device placement (2017), and non-insulin dependent T2DM, c/o weakness that caused him to fall to his knees today. Per patient, he felt lightheaded and lost his balance upon standing when he was getting dressed for bed. He denies head injury or LOC but states he was unable to get up on his own. Upon EMS arrival, pt was noted to have SpO2 ~70s on RA which improved with supplemental O2. Of note, patient was recently admitted to [facility] (2/20-2/26) for PNA, after which he continued to feel weak. He describes being chronically SOB requiring 2L NC O2 at night and that his current respiratory status is unchanged from baseline. Denies recent sick contacts or recent travel. Additionally admits to 1x nonbloody diarrhea at time of fall. Currently endorsing non-radiating "terrible" central chest pain, worsened with inspiration, that began when placed on the stretcher by EMS. Otherwise denies fever, chills, headache, vision changes, numbness/tingling, current palpitations, abd pain, N/V, dysuria, hematuria, skin changes, or new myalgia/arthralgia. Patient expresses wishes to be DNR/DNI.    In the ED  VS: T 96.8 HR 92 /59 RR 26 SpO2 95% on 3L NC ->97% on 2L NC  Significant labs include lactate 4.3, H/H 9.5/30.2, lymphocytes 0.88, NLR 6.8, BUN/Cr 28/1.6, glucose 182, .  CXR: based on my preliminary read with comparison to CXR on 2/20, there appears to be a new LLL opacification with interval improvement of R-sided infiltrate  CT head: No gross acute intracranial hemorrhage, mass effect, or acute osseous fracture. 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  CT Cspine: No acute cervical spine fracture or evidence of traumatic malalignment. Cervical spondylosis.  EKG: AV dual-paced w/ occasional PVC    Patient received 2L NS. 87yo M, with PMH/o CAD s/p CABG x3 (30 yrs ago), s/p 4 stents placed (10 yrs ago), s/p PPM ([employer]), HTN, HLD, atrial fibrillation (on Eliquis) s/p Watchman device placement (2017), and non-insulin dependent T2DM, c/o weakness that caused him to fall to his knees today. Per patient, he felt lightheaded and lost his balance upon standing when he was getting dressed for bed. He denies head injury and states he was unable to get up on his own. Upon EMS arrival, pt was reportedly unresponsive, having received chest compressions from a bystander despite not being in arrest, and noted to have SpO2 ~70s on RA which improved with supplemental O2. Of note, patient was recently admitted to [facility] (2/20-2/26) for PNA, after which he continued to feel weak. He describes being chronically SOB requiring 2L NC O2 at night and that his current respiratory status is unchanged from baseline. Denies recent sick contacts or recent travel. Additionally admits to 1x nonbloody diarrhea at time of fall. Currently endorsing non-radiating "terrible" central chest pain, worsened with inspiration, that began when placed on the stretcher by EMS. Otherwise denies fever, chills, headache, vision changes, numbness/tingling, current palpitations, abd pain, N/V, dysuria, hematuria, skin changes, or new myalgia/arthralgia. Patient expresses wishes to be DNR/DNI, MOLST completed.    In the ED  VS: T 96.8 HR 92 /59 RR 26 SpO2 95% on 3L NC ->97% on 2L NC  Significant labs include lactate 4.3, H/H 9.5/30.2, lymphocytes 0.88, NLR 6.8, BUN/Cr 28/1.6, glucose 182, .  CXR: based on my preliminary read with comparison to CXR on 2/20, there appears to be a new LLL opacification with interval improvement of R-sided infiltrate  CT head: No gross acute intracranial hemorrhage, mass effect, or acute osseous fracture. 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  CT Cspine: No acute cervical spine fracture or evidence of traumatic malalignment. Cervical spondylosis.  EKG: AV dual-paced w/ occasional PVC    Patient received 2L NS.

## 2020-03-28 NOTE — H&P ADULT - PROBLEM SELECTOR PLAN 1
- Pt p/w weakness, questionable syncope, and SOB  - Tachycardic and tachypneic upon ED presentation with suspected viral illness  - Lactate 4.3, s/p 2L NS in the ED, f/u repeat lactate  - CXR demonstrating interval development of new LLL infiltrate compared to CXR on 2/20 based on my prelim read, pending official report  - F/u blood cx and urine cx  - Currently afebrile and w/o leukocytosis, monitor daily temperature curve and daily CBC w/ diff  - Bilateral opacifications on CXR in setting of suspected COVID19 viral illness given clinical presentation, labs significant for lymphopenia, NLR 6.8  - Full RVP Panel negative  - May use O2 NC, Venturi Mask, NRB as needed depending on oxygen demand, titrate supp O2 to SpO2 >93%  - Avoid HFNC/NIPPV/aerosolizing procedures i.e. nebulizations  - Avoid NSAIDs, use tylenol PRN fevers  - Maintain strict isolation precautions, use proper PPE   - Check urine legionella/strep antigens, check procalcitonin  - Daily CBC with diff to follow eosinophils, lymphocytes and neutrophils; daily CK  - Given risk factors (age and comorbidities): check LDH, CRP, ferritin, ESR, PT/PTT, CK, lactate, troponin  - Baseline EKG _____, monitor for cardiac decompensation, monitor telemetry  - Monitor respiratory status closely, NEWS2 score 6 on admission, medium risk, reassess q1h prn  - Code Status: expresses wishes to be DNR/DNI though no MOLST completed at this time - Pt p/w weakness, questionable syncope, and SOB  - Tachycardic and tachypneic upon ED presentation with suspected viral illness  - Lactate 4.3, s/p 2L NS in the ED, f/u repeat lactate  - CXR demonstrating interval development of new LLL infiltrate compared to CXR on 2/20 based on my prelim read, pending official report  - F/u blood cx and urine cx  - Currently afebrile and w/o leukocytosis, monitor daily temperature curve and daily CBC w/ diff  - Bilateral opacifications on CXR in setting of suspected COVID19 viral illness given clinical presentation, labs significant for lymphopenia, NLR 6.8  - Full RVP Panel negative  - May use O2 NC, Venturi Mask, NRB as needed depending on oxygen demand, titrate supp O2 to SpO2 >93%  - Avoid HFNC/NIPPV/aerosolizing procedures i.e. nebulizations  - Avoid NSAIDs, use tylenol PRN fevers  - Maintain strict isolation precautions, use proper PPE   - Check urine legionella/strep antigens, check procalcitonin  - Daily CBC with diff to follow eosinophils, lymphocytes and neutrophils; daily CK  - Given risk factors (age and comorbidities): check LDH, CRP, ferritin, ESR, PT/PTT, CK, lactate, troponin  - Baseline EKG AV paced with occasional PVC (grossly unchanged from EKG on 2/20), monitor for cardiac decompensation, monitor telemetry  - Monitor respiratory status closely, NEWS2 score 6 on admission, medium risk, reassess q1h prn  - Code Status: expresses wishes to be DNR/DNI though no MOLST completed at this time - Pt p/w weakness, questionable syncope, and SOB  - Tachycardic and tachypneic upon ED presentation with suspected viral illness  - Lactate 4.3, s/p 2L NS in the ED, f/u repeat lactate  - CXR demonstrating interval development of new LLL infiltrate compared to CXR on 2/20 based on my prelim read, pending official report  - F/u blood cx and urine cx  - Currently afebrile and w/o leukocytosis, monitor daily temperature curve and daily CBC w/ diff  - Bilateral opacifications on CXR in setting of suspected COVID19 viral illness given clinical presentation, labs significant for lymphopenia, NLR 6.8  - Full RVP Panel negative  - May use O2 NC, Venturi Mask, NRB as needed depending on oxygen demand, titrate supp O2 to SpO2 >93%  - Avoid HFNC/NIPPV/aerosolizing procedures i.e. nebulizations  - Avoid NSAIDs, use tylenol PRN fevers  - Maintain strict isolation precautions, use proper PPE   - Check urine legionella/strep antigens, check procalcitonin  - Daily CBC with diff to follow eosinophils, lymphocytes and neutrophils; daily CK  - Given risk factors (age and comorbidities): check LDH, CRP, ferritin, ESR, PT/PTT, CK, lactate, troponin  - Baseline EKG AV paced with occasional PVC (grossly unchanged from EKG on 2/20), monitor for cardiac decompensation, monitor telemetry  - Monitor respiratory status closely, NEWS2 score 6 on admission, medium risk, reassess q1h prn  - Code Status: DNR/DNI

## 2020-03-28 NOTE — H&P ADULT - PROBLEM SELECTOR PLAN 10
IMPROVE VTE Individual Risk Assessment          RISK                                                          Points  [  ] Previous VTE                                                3  [  ] Thrombophilia                                             2  [  ] Lower limb paralysis                                   2        (unable to hold up >15 seconds)    [  ] Current Cancer                                             2         (within 6 months)  [  ] Immobilization > 24 hrs                              1  [  ] ICU/CCU stay > 24 hours                             1  [ x ] Age > 60                                                         1    IMPROVE VTE Score: 1    - DVT ppx with Xarelto 2.5mg BID IMPROVE VTE Individual Risk Assessment          RISK                                                          Points  [  ] Previous VTE                                                3  [  ] Thrombophilia                                             2  [  ] Lower limb paralysis                                   2        (unable to hold up >15 seconds)    [  ] Current Cancer                                             2         (within 6 months)  [  ] Immobilization > 24 hrs                              1  [  ] ICU/CCU stay > 24 hours                             1  [ x ] Age > 60                                                         1    IMPROVE VTE Score: 1    - DVT ppx with ___________ IMPROVE VTE Individual Risk Assessment          RISK                                                          Points  [  ] Previous VTE                                                3  [  ] Thrombophilia                                             2  [  ] Lower limb paralysis                                   2        (unable to hold up >15 seconds)    [  ] Current Cancer                                             2         (within 6 months)  [  ] Immobilization > 24 hrs                              1  [  ] ICU/CCU stay > 24 hours                             1  [ x ] Age > 60                                                         1    IMPROVE VTE Score: 1    - On heparin gtt

## 2020-03-28 NOTE — H&P ADULT - PROBLEM SELECTOR PLAN 5
- Stable, chronic  - Currently rate-controlled  - Continue with home Carvedilol 12.5mg BID with hold parameters  - Previously on Xarelto 2.5mg BID, will hold at this time as patient reportedly with recent GIB per chart review; patient currently anemic and s/p Watchman device placement in 2017 - Stable, chronic  - Currently rate-controlled  - Continue with home Carvedilol 12.5mg BID with hold parameters  - Previously on Xarelto 2.5mg BID, will hold at this time as s/p Watchman device placement in 2017

## 2020-03-28 NOTE — H&P ADULT - NSICDXPASTMEDICALHX_GEN_ALL_CORE_FT
PAST MEDICAL HISTORY:  Coronary artery disease involving native coronary artery of native heart, angina presence unspecified     Hyperlipemia     Hypertension     MI, old     Pacemaker     Persistent atrial fibrillation wwas on eliquis, stopped due to gi bleed workup underway    S/P coronary artery bypass graft x 3     Sepsis sec to prostate bx    Type 2 diabetes mellitus without complication, without long-term current use of insulin

## 2020-03-28 NOTE — ED PROVIDER NOTE - OBJECTIVE STATEMENT
89 yo M p/w went down to knees today after he felt weak while walking. NO loc. No head inj. Pt denies pain from fall. pt with some chest heaviness today, also with some chronic dyspnea. Pt was found by EMS to have O2 sat ~ 70 on ra, improved with O2. NO abd pain. no neck / back pain. No known fever/chills. Pt with recent PNA few weeks ago, was treated as inpatient. no agg/allev factors. NO other inj or co.

## 2020-03-28 NOTE — H&P ADULT - PROBLEM SELECTOR PLAN 3
- Per pt, has chronic SOB that is currently unchanged  - Notes use of home O2, 2L NC at night while asleep  - Possibly 2/2 current anemia vs suspected viral illness vs afib  - Rx'd Lasix during most recent admission d/t apparent volume overload, though PE findings today do/don't support hypervolemia despite elevated BNP? Hold Lasix for now.  - Monitor continuous pulse ox and continue with supp O2 prn - Per pt, has chronic SOB that is currently unchanged  - Notes use of home O2, 2L NC at night while asleep  - Possibly 2/2 current anemia vs suspected viral illness vs afib vs PE in setting of syncope  - Rx'd Lasix during most recent admission d/t apparent volume overload, though PE findings today do not support hypervolemia despite elevated BNP. Hold Lasix for now.  - Will initiate heparin gtt to cover for possible ACS vs PE  - Monitor continuous pulse ox and continue with supp O2 prn - Per pt, has chronic SOB that is currently unchanged  - Notes use of home O2, 2L NC at night while asleep  - Possibly 2/2 current anemia vs suspected viral illness vs afib vs PE in setting of syncope  - Rx'd Lasix during most recent admission d/t apparent volume overload, though PE findings today do not support hypervolemia despite elevated BNP. Hold Lasix for now.  - Will initiate heparin gtt to cover for possible ACS vs PE in setting of elevated age-adjusted d-dimer  - F/u b/l LE doppler  - Monitor continuous pulse ox and continue with supp O2 prn

## 2020-03-28 NOTE — H&P ADULT - NSHPREVIEWOFSYSTEMS_GEN_ALL_CORE
CONSTITUTIONAL: admits weakness; denies fever, chills, fatigue  HEENT: denies blurred vision, sore throat  SKIN: denies new lesions, rash  CARDIOVASCULAR: admits chest pain; denies palpitations  RESPIRATORY: admits chronic SOB; denies cough, sputum production  GASTROINTESTINAL: admits diarrhea; denies nausea, vomiting, abdominal pain  GENITOURINARY: denies dysuria, discharge  NEUROLOGICAL: denies numbness, headache, focal weakness  MUSCULOSKELETAL: denies new joint pain, muscle aches  HEMATOLOGIC: denies gross bleeding, bruising

## 2020-03-28 NOTE — H&P ADULT - NSICDXPASTSURGICALHX_GEN_ALL_CORE_FT
PAST SURGICAL HISTORY:  Artificial cardiac pacemaker     H/O coronary angioplasty 4 stents    H/O prostate biopsy     History of cholecystectomy     Presence of Watchman left atrial appendage closure device     S/P CABG x 3     Stented coronary artery

## 2020-03-28 NOTE — ED ADULT TRIAGE NOTE - CHIEF COMPLAINT QUOTE
as per ems patient was cyanotic at the lips and in respiratory failure when they saw in, patient came around after playing a oropharyngeal airway, patient awake, alert, talking. as per ems patient c/o chest pain now

## 2020-03-28 NOTE — H&P ADULT - PROBLEM SELECTOR PLAN 4
- BUN/Cr elevated from baseline ~1.2  - Evidence of CKD stage 3 per chart review  - Likely 2/2 dehydration in setting of reported diarrhea and anemia  - S/p 2L NS in the ED  - Avoid nephrotoxic meds  - Monitor daily renal function

## 2020-03-28 NOTE — H&P ADULT - PROBLEM SELECTOR PLAN 9
- Chronic, stable  - Takes Carvedilol 12.5mg BID at home, may continue while inpatient with hold parameters  - Monitor routine hemodynamics

## 2020-03-28 NOTE — H&P ADULT - ASSESSMENT
87yo M, with PMH/o CAD s/p CABG x3 (30 yrs ago), s/p 4 stents placed (10 yrs ago), s/p PPM ([employer]), HTN, HLD, atrial fibrillation (on Eliquis) s/p Watchman device placement (2017), and non-insulin dependent T2DM, c/o weakness and SOB admitted for questionable syncopal episode at home.

## 2020-03-28 NOTE — H&P ADULT - NSHPSOCIALHISTORY_GEN_ALL_CORE
Lives at home with wife and caretaker  Ambulates with cane  Performs ADLs independently  Denies tobacco, EtOH, or rec drug use  Reports receiving flu, shingles, and PNA vaccines

## 2020-03-28 NOTE — H&P ADULT - NSHPPHYSICALEXAM_GEN_ALL_CORE
CHARTING IN PROGRESS Vital Signs Last 24 Hrs  T(C): 36.5 (29 Mar 2020 01:38), Max: 36.8 (28 Mar 2020 23:52)  T(F): 97.7 (29 Mar 2020 01:38), Max: 98.3 (28 Mar 2020 23:52)  HR: 69 (29 Mar 2020 03:56) (69 - 92)  BP: 116/58 (29 Mar 2020 03:56) (116/58 - 125/67)  BP(mean): --  RR: 18 (29 Mar 2020 03:56) (18 - 26)  SpO2: 98% (29 Mar 2020 03:56) (95% - 99%)    General: elderly, male, NAD  HEENT: NCAT, PERRLA, EOMI bl, moist mucous membranes   Neck: Supple, nontender, no mass  Neurology: A&Ox3, non focal  Respiratory: diminished b/l, no w/r/r , no accessory muscle use  CV: RRR, +S1/S2, no murmurs, rubs or gallops  Abdominal: Soft, NT, ND +BSx4   Extremities: No C/C/E, + peripheral pulses  Skin: warm, dry, normal color

## 2020-03-29 ENCOUNTER — TRANSCRIPTION ENCOUNTER (OUTPATIENT)
Age: 85
End: 2020-03-29

## 2020-03-29 LAB
ALBUMIN SERPL ELPH-MCNC: 3 G/DL — LOW (ref 3.3–5)
ALP SERPL-CCNC: 57 U/L — SIGNIFICANT CHANGE UP (ref 40–120)
ALT FLD-CCNC: 40 U/L — SIGNIFICANT CHANGE UP (ref 12–78)
ANION GAP SERPL CALC-SCNC: 5 MMOL/L — SIGNIFICANT CHANGE UP (ref 5–17)
APTT BLD: 30.9 SEC — SIGNIFICANT CHANGE UP (ref 28.5–37)
APTT BLD: > 200 SEC (ref 28.5–37)
AST SERPL-CCNC: 69 U/L — HIGH (ref 15–37)
BACTERIA # UR AUTO: ABNORMAL
BILIRUB SERPL-MCNC: 0.4 MG/DL — SIGNIFICANT CHANGE UP (ref 0.2–1.2)
BUN SERPL-MCNC: 23 MG/DL — SIGNIFICANT CHANGE UP (ref 7–23)
CALCIUM SERPL-MCNC: 8.8 MG/DL — SIGNIFICANT CHANGE UP (ref 8.5–10.1)
CHLORIDE SERPL-SCNC: 108 MMOL/L — SIGNIFICANT CHANGE UP (ref 96–108)
CK MB BLD-MCNC: 3.3 % — SIGNIFICANT CHANGE UP (ref 0–3.5)
CK MB BLD-MCNC: 3.9 % — HIGH (ref 0–3.5)
CK MB CFR SERPL CALC: 2.4 NG/ML — SIGNIFICANT CHANGE UP (ref 0–3.6)
CK MB CFR SERPL CALC: 2.5 NG/ML — SIGNIFICANT CHANGE UP (ref 0–3.6)
CK SERPL-CCNC: 61 U/L — SIGNIFICANT CHANGE UP (ref 26–308)
CK SERPL-CCNC: 76 U/L — SIGNIFICANT CHANGE UP (ref 26–308)
CO2 SERPL-SCNC: 28 MMOL/L — SIGNIFICANT CHANGE UP (ref 22–31)
CREAT SERPL-MCNC: 1.4 MG/DL — HIGH (ref 0.5–1.3)
CRP SERPL-MCNC: 0.61 MG/DL — HIGH (ref 0–0.4)
EPI CELLS # UR: SIGNIFICANT CHANGE UP
FERRITIN SERPL-MCNC: 162 NG/ML — SIGNIFICANT CHANGE UP (ref 30–400)
GLUCOSE SERPL-MCNC: 116 MG/DL — HIGH (ref 70–99)
HBA1C BLD-MCNC: 6.8 % — HIGH (ref 4–5.6)
HCT VFR BLD CALC: 25.8 % — LOW (ref 39–50)
HCT VFR BLD CALC: 26.6 % — LOW (ref 39–50)
HGB BLD-MCNC: 8.3 G/DL — LOW (ref 13–17)
HGB BLD-MCNC: 8.6 G/DL — LOW (ref 13–17)
LACTATE SERPL-SCNC: 1.1 MMOL/L — SIGNIFICANT CHANGE UP (ref 0.7–2)
LDH SERPL L TO P-CCNC: 231 U/L — SIGNIFICANT CHANGE UP (ref 50–242)
LEGIONELLA AG UR QL: NEGATIVE — SIGNIFICANT CHANGE UP
MCHC RBC-ENTMCNC: 29.7 PG — SIGNIFICANT CHANGE UP (ref 27–34)
MCHC RBC-ENTMCNC: 32.2 GM/DL — SIGNIFICANT CHANGE UP (ref 32–36)
MCV RBC AUTO: 92.5 FL — SIGNIFICANT CHANGE UP (ref 80–100)
NRBC # BLD: 0 /100 WBCS — SIGNIFICANT CHANGE UP (ref 0–0)
PLATELET # BLD AUTO: 157 K/UL — SIGNIFICANT CHANGE UP (ref 150–400)
POTASSIUM SERPL-MCNC: 4.4 MMOL/L — SIGNIFICANT CHANGE UP (ref 3.5–5.3)
POTASSIUM SERPL-SCNC: 4.4 MMOL/L — SIGNIFICANT CHANGE UP (ref 3.5–5.3)
PROCALCITONIN SERPL-MCNC: <0.05 NG/ML — HIGH (ref 0–0.04)
PROT SERPL-MCNC: 6.5 G/DL — SIGNIFICANT CHANGE UP (ref 6–8.3)
RBC # BLD: 2.79 M/UL — LOW (ref 4.2–5.8)
RBC # FLD: 14.8 % — HIGH (ref 10.3–14.5)
RBC CASTS # UR COMP ASSIST: SIGNIFICANT CHANGE UP /HPF (ref 0–4)
SODIUM SERPL-SCNC: 141 MMOL/L — SIGNIFICANT CHANGE UP (ref 135–145)
TROPONIN I SERPL-MCNC: 0.1 NG/ML — HIGH (ref 0.01–0.04)
TROPONIN I SERPL-MCNC: 0.15 NG/ML — HIGH (ref 0.01–0.04)
WBC # BLD: 8.16 K/UL — SIGNIFICANT CHANGE UP (ref 3.8–10.5)
WBC # FLD AUTO: 8.16 K/UL — SIGNIFICANT CHANGE UP (ref 3.8–10.5)
WBC UR QL: SIGNIFICANT CHANGE UP

## 2020-03-29 PROCEDURE — 93010 ELECTROCARDIOGRAM REPORT: CPT

## 2020-03-29 PROCEDURE — 99233 SBSQ HOSP IP/OBS HIGH 50: CPT

## 2020-03-29 RX ORDER — FUROSEMIDE 40 MG
20 TABLET ORAL ONCE
Refills: 0 | Status: COMPLETED | OUTPATIENT
Start: 2020-03-29 | End: 2020-03-29

## 2020-03-29 RX ORDER — HEPARIN SODIUM 5000 [USP'U]/ML
7500 INJECTION INTRAVENOUS; SUBCUTANEOUS ONCE
Refills: 0 | Status: COMPLETED | OUTPATIENT
Start: 2020-03-29 | End: 2020-03-29

## 2020-03-29 RX ORDER — FUROSEMIDE 40 MG
40 TABLET ORAL ONCE
Refills: 0 | Status: DISCONTINUED | OUTPATIENT
Start: 2020-03-29 | End: 2020-03-29

## 2020-03-29 RX ORDER — HEPARIN SODIUM 5000 [USP'U]/ML
INJECTION INTRAVENOUS; SUBCUTANEOUS
Qty: 25000 | Refills: 0 | Status: DISCONTINUED | OUTPATIENT
Start: 2020-03-29 | End: 2020-03-29

## 2020-03-29 RX ORDER — HEPARIN SODIUM 5000 [USP'U]/ML
7500 INJECTION INTRAVENOUS; SUBCUTANEOUS EVERY 6 HOURS
Refills: 0 | Status: DISCONTINUED | OUTPATIENT
Start: 2020-03-29 | End: 2020-03-29

## 2020-03-29 RX ORDER — ACETAMINOPHEN 500 MG
650 TABLET ORAL EVERY 6 HOURS
Refills: 0 | Status: DISCONTINUED | OUTPATIENT
Start: 2020-03-29 | End: 2020-03-30

## 2020-03-29 RX ORDER — HEPARIN SODIUM 5000 [USP'U]/ML
3500 INJECTION INTRAVENOUS; SUBCUTANEOUS EVERY 6 HOURS
Refills: 0 | Status: DISCONTINUED | OUTPATIENT
Start: 2020-03-29 | End: 2020-03-29

## 2020-03-29 RX ADMIN — ATORVASTATIN CALCIUM 80 MILLIGRAM(S): 80 TABLET, FILM COATED ORAL at 23:19

## 2020-03-29 RX ADMIN — HEPARIN SODIUM 7500 UNIT(S): 5000 INJECTION INTRAVENOUS; SUBCUTANEOUS at 01:29

## 2020-03-29 RX ADMIN — SERTRALINE 75 MILLIGRAM(S): 25 TABLET, FILM COATED ORAL at 13:25

## 2020-03-29 RX ADMIN — Medication 20 MILLIGRAM(S): at 23:24

## 2020-03-29 RX ADMIN — Medication 81 MILLIGRAM(S): at 13:25

## 2020-03-29 RX ADMIN — Medication 1: at 23:58

## 2020-03-29 RX ADMIN — Medication 650 MILLIGRAM(S): at 13:24

## 2020-03-29 RX ADMIN — PANTOPRAZOLE SODIUM 40 MILLIGRAM(S): 20 TABLET, DELAYED RELEASE ORAL at 07:07

## 2020-03-29 RX ADMIN — BUDESONIDE AND FORMOTEROL FUMARATE DIHYDRATE 2 PUFF(S): 160; 4.5 AEROSOL RESPIRATORY (INHALATION) at 23:24

## 2020-03-29 RX ADMIN — Medication 325 MILLIGRAM(S): at 13:25

## 2020-03-29 RX ADMIN — CARVEDILOL PHOSPHATE 12.5 MILLIGRAM(S): 80 CAPSULE, EXTENDED RELEASE ORAL at 08:10

## 2020-03-29 RX ADMIN — Medication 650 MILLIGRAM(S): at 14:24

## 2020-03-29 RX ADMIN — Medication 2: at 12:58

## 2020-03-29 RX ADMIN — TAMSULOSIN HYDROCHLORIDE 0.8 MILLIGRAM(S): 0.4 CAPSULE ORAL at 23:19

## 2020-03-29 RX ADMIN — HEPARIN SODIUM 0 UNIT(S)/HR: 5000 INJECTION INTRAVENOUS; SUBCUTANEOUS at 09:45

## 2020-03-29 RX ADMIN — HEPARIN SODIUM 1700 UNIT(S)/HR: 5000 INJECTION INTRAVENOUS; SUBCUTANEOUS at 01:29

## 2020-03-29 RX ADMIN — HEPARIN SODIUM 1400 UNIT(S)/HR: 5000 INJECTION INTRAVENOUS; SUBCUTANEOUS at 12:01

## 2020-03-29 RX ADMIN — MONTELUKAST 10 MILLIGRAM(S): 4 TABLET, CHEWABLE ORAL at 13:25

## 2020-03-29 RX ADMIN — PANTOPRAZOLE SODIUM 40 MILLIGRAM(S): 20 TABLET, DELAYED RELEASE ORAL at 17:20

## 2020-03-29 RX ADMIN — CARVEDILOL PHOSPHATE 12.5 MILLIGRAM(S): 80 CAPSULE, EXTENDED RELEASE ORAL at 17:21

## 2020-03-29 RX ADMIN — BUDESONIDE AND FORMOTEROL FUMARATE DIHYDRATE 2 PUFF(S): 160; 4.5 AEROSOL RESPIRATORY (INHALATION) at 08:12

## 2020-03-29 NOTE — PROGRESS NOTE ADULT - PROBLEM SELECTOR PLAN 2
- Pt reporting lightheadedness upon standing when preparing for bed  - Denies LOC or head injury  - CT head demonstrating no gross acute intracranial hemorrhage, mass effect, or acute osseous fracture  - Trops (-) x1, EKG AV dual-paced with PVC  - Trend CE  - Lightheadedness suspect d/t decreased intravascular volume vs ACS vs PE in setting of elevated age-adjusted d-dimer  - F/u orthostatic VS- start heparin gtt. obtain cta when cr improves - Pt reporting lightheadedness upon standing when preparing for bed  - Denies LOC or head injury  - CT head demonstrating no gross acute intracranial hemorrhage, mass effect, or acute osseous fracture  - Trops (-) x1, EKG AV dual-paced with PVC  - Trop mildly elevated   - Lightheadedness suspect d/t decreased intravascular volume vs ACS vs PE in setting of elevated age-adjusted d-dimer  - F/u orthostatic VS- start heparin gtt. obtain cta when cr improves or VQ scan

## 2020-03-29 NOTE — PROGRESS NOTE ADULT - PROBLEM SELECTOR PLAN 10
IMPROVE VTE Individual Risk Assessment          RISK                                                          Points  [  ] Previous VTE                                                3  [  ] Thrombophilia                                             2  [  ] Lower limb paralysis                                   2        (unable to hold up >15 seconds)    [  ] Current Cancer                                             2         (within 6 months)  [  ] Immobilization > 24 hrs                              1  [  ] ICU/CCU stay > 24 hours                             1  [ x ] Age > 60                                                         1    IMPROVE VTE Score: 1    - On heparin gtt

## 2020-03-29 NOTE — PROGRESS NOTE ADULT - SUBJECTIVE AND OBJECTIVE BOX
Patient is a 88y old  Male who presents with a chief complaint of collapse, SOB, r/o COVID - unclear date of sx onset, CODE: DNR/DNI (28 Mar 2020 21:58)      INTERVAL HPI/OVERNIGHT EVENTS: 89yo M, with PMH/o CAD s/p CABG x3 (30 yrs ago), s/p 4 stents placed (10 yrs ago), s/p PPM ([employer]), HTN, HLD, atrial fibrillation (on Eliquis) s/p Watchman device placement (2017), and non-insulin dependent T2DM, c/o weakness and SOB admitted for questionable syncopal episode at home. Seen and examined at bedside. States that still is sob and feels like chest tightness. Denies palpitation, abdominal pain, nausea or vomiting       MEDICATIONS  (STANDING):  aspirin  chewable 81 milliGRAM(s) Oral daily  atorvastatin 80 milliGRAM(s) Oral at bedtime  budesonide  80 MICROgram(s)/formoterol 4.5 MICROgram(s) Inhaler 2 Puff(s) Inhalation two times a day  carvedilol 12.5 milliGRAM(s) Oral every 12 hours  dextrose 5%. 1000 milliLiter(s) (50 mL/Hr) IV Continuous <Continuous>  dextrose 50% Injectable 12.5 Gram(s) IV Push once  dextrose 50% Injectable 25 Gram(s) IV Push once  dextrose 50% Injectable 25 Gram(s) IV Push once  ferrous    sulfate 325 milliGRAM(s) Oral daily  heparin  Infusion.  Unit(s)/Hr (17 mL/Hr) IV Continuous <Continuous>  insulin lispro (HumaLOG) corrective regimen sliding scale   SubCutaneous Before meals and at bedtime  montelukast 10 milliGRAM(s) Oral daily  pantoprazole    Tablet 40 milliGRAM(s) Oral two times a day  sertraline 75 milliGRAM(s) Oral daily  tamsulosin 0.8 milliGRAM(s) Oral at bedtime    MEDICATIONS  (PRN):  ALBUTerol    90 MICROgram(s) HFA Inhaler 2 Puff(s) Inhalation every 6 hours PRN Shortness of Breath and/or Wheezing  dextrose 40% Gel 15 Gram(s) Oral once PRN Blood Glucose LESS THAN 70 milliGRAM(s)/deciliter  glucagon  Injectable 1 milliGRAM(s) IntraMuscular once PRN Glucose LESS THAN 70 milligrams/deciliter  heparin  Injectable 7500 Unit(s) IV Push every 6 hours PRN For aPTT less than 40  heparin  Injectable 3500 Unit(s) IV Push every 6 hours PRN For aPTT between 40 - 57      Allergies    No Known Allergies    Intolerances        REVIEW OF SYSTEMS:  CONSTITUTIONAL: No fever, weight loss, or fatigue  EYES: No eye pain, visual disturbances, or discharge  ENMT:  No difficulty hearing, tinnitus, vertigo; No sinus or throat pain  NECK: No pain or stiffness  BREASTS: No pain, masses, or nipple discharge  RESPIRATORY: No cough, wheezing, chills or hemoptysis; No shortness of breath  CARDIOVASCULAR: No chest pain, palpitations, dizziness, or leg swelling  GASTROINTESTINAL: No abdominal or epigastric pain. No nausea, vomiting, or hematemesis; No diarrhea or constipation. No melena or hematochezia.  GENITOURINARY: No dysuria, frequency, hematuria, or incontinence  NEUROLOGICAL: No headaches, memory loss, loss of strength, numbness, or tremors  SKIN: No itching, burning, rashes, or lesions   LYMPH NODES: No enlarged glands  ENDOCRINE: No heat or cold intolerance; No hair loss; No polydipsia or polyuria  MUSCULOSKELETAL: No joint pain or swelling; No muscle, back, or extremity pain  PSYCHIATRIC: No depression, anxiety, mood swings, or difficulty sleeping  HEME/LYMPH: No easy bruising, or bleeding gums  ALLERGY AND IMMUNOLOGIC: No hives or eczema    Vital Signs Last 24 Hrs  T(C): 36.6 (29 Mar 2020 05:37), Max: 36.8 (28 Mar 2020 23:52)  T(F): 97.8 (29 Mar 2020 05:37), Max: 98.3 (28 Mar 2020 23:52)  HR: 70 (29 Mar 2020 05:37) (69 - 92)  BP: 122/68 (29 Mar 2020 05:37) (116/58 - 129/85)  BP(mean): --  RR: 18 (29 Mar 2020 05:37) (18 - 26)  SpO2: 98% (29 Mar 2020 05:37) (95% - 100%)    PHYSICAL EXAM:  GENERAL: NAD, well-groomed, well-developed  HEAD:  Atraumatic, Normocephalic  EYES: EOMI, PERRLA, conjunctiva and sclera clear  ENMT: No tonsillar erythema, exudates, or enlargement; Moist mucous membranes, Good dentition, No lesions  NECK: Supple, No JVD, Normal thyroid  NERVOUS SYSTEM:  Alert & Oriented X3, Good concentration; Motor Strength 5/5 B/L upper and lower extremities; DTRs 2+ intact and symmetric  CHEST/LUNG: Clear to auscultation bilaterally; No rales, rhonchi, wheezing, or rubs  HEART: Regular rate and rhythm; No murmurs, rubs, or gallops  ABDOMEN: Soft, Nontender, Nondistended; Bowel sounds present  EXTREMITIES:  2+ Peripheral Pulses, No clubbing, cyanosis, or edema  LYMPH: No lymphadenopathy noted  SKIN: No rashes or lesions    LABS:                        8.3    8.16  )-----------( 157      ( 29 Mar 2020 07:51 )             25.8     29 Mar 2020 07:51    141    |  108    |  23     ----------------------------<  116    4.4     |  28     |  1.40     Ca    8.8        29 Mar 2020 07:51    TPro  6.5    /  Alb  3.0    /  TBili  0.4    /  DBili  x      /  AST  69     /  ALT  40     /  AlkPhos  57     29 Mar 2020 07:51    PT/INR - ( 28 Mar 2020 20:46 )   PT: 13.2 sec;   INR: 1.17 ratio         PTT - ( 29 Mar 2020 07:51 )  PTT:> 200 sec  CAPILLARY BLOOD GLUCOSE      POCT Blood Glucose.: 136 mg/dL (29 Mar 2020 08:27)    BLOOD CULTURE    RADIOLOGY & ADDITIONAL TESTS:    Imaging Personally Reviewed:  [ ] YES     Consultant(s) Notes Reviewed:      Care Discussed with Consultants/Other Providers: Patient is a 88y old  Male who presents with a chief complaint of collapse, SOB, r/o COVID - unclear date of sx onset, CODE: DNR/DNI (28 Mar 2020 21:58)      INTERVAL HPI/OVERNIGHT EVENTS: 87yo M, with PMH/o CAD s/p CABG x3 (30 yrs ago), s/p 4 stents placed (10 yrs ago), s/p PPM ([employer]), HTN, HLD, atrial fibrillation (on Eliquis) s/p Watchman device placement (2017), and non-insulin dependent T2DM, c/o weakness and SOB admitted for questionable syncopal episode at home. Seen and examined at bedside. States that still is sob and feels like chest tightness. Denies palpitation, abdominal pain, nausea or vomiting       MEDICATIONS  (STANDING):  aspirin  chewable 81 milliGRAM(s) Oral daily  atorvastatin 80 milliGRAM(s) Oral at bedtime  budesonide  80 MICROgram(s)/formoterol 4.5 MICROgram(s) Inhaler 2 Puff(s) Inhalation two times a day  carvedilol 12.5 milliGRAM(s) Oral every 12 hours  dextrose 5%. 1000 milliLiter(s) (50 mL/Hr) IV Continuous <Continuous>  dextrose 50% Injectable 12.5 Gram(s) IV Push once  dextrose 50% Injectable 25 Gram(s) IV Push once  dextrose 50% Injectable 25 Gram(s) IV Push once  ferrous    sulfate 325 milliGRAM(s) Oral daily  heparin  Infusion.  Unit(s)/Hr (17 mL/Hr) IV Continuous <Continuous>  insulin lispro (HumaLOG) corrective regimen sliding scale   SubCutaneous Before meals and at bedtime  montelukast 10 milliGRAM(s) Oral daily  pantoprazole    Tablet 40 milliGRAM(s) Oral two times a day  sertraline 75 milliGRAM(s) Oral daily  tamsulosin 0.8 milliGRAM(s) Oral at bedtime    MEDICATIONS  (PRN):  ALBUTerol    90 MICROgram(s) HFA Inhaler 2 Puff(s) Inhalation every 6 hours PRN Shortness of Breath and/or Wheezing  dextrose 40% Gel 15 Gram(s) Oral once PRN Blood Glucose LESS THAN 70 milliGRAM(s)/deciliter  glucagon  Injectable 1 milliGRAM(s) IntraMuscular once PRN Glucose LESS THAN 70 milligrams/deciliter  heparin  Injectable 7500 Unit(s) IV Push every 6 hours PRN For aPTT less than 40  heparin  Injectable 3500 Unit(s) IV Push every 6 hours PRN For aPTT between 40 - 57      Allergies    No Known Allergies    Intolerances        REVIEW OF SYSTEMS:  CONSTITUTIONAL: No fever, or fatigue   EYES: No eye pain, visual disturbances, or discharge  ENMT:  No difficulty hearing, tinnitus, vertigo; No sinus or throat pain  NECK: No pain or stiffness  RESPIRATORY: No cough, wheezing,  + shortness of breath  CARDIOVASCULAR: +  chest tightness, no palpitations, dizziness, or leg swelling  GASTROINTESTINAL: No abdominal or epigastric pain. No nausea, vomiting, or hematemesis; No diarrhea or constipation.  GENITOURINARY: No dysuria, frequency, hematuria, or incontinence  NEUROLOGICAL: No headaches,  loss of strength, numbness, or tremors  SKIN: No itching, burning, rashes, or lesions   LYMPH NODES: No enlarged glands  ENDOCRINE: No heat or cold intolerance; No hair loss; No polydipsia or polyuria  MUSCULOSKELETAL: No joint pain or swelling; No muscle, back, or extremity pain  HEME/LYMPH: No easy bruising, or bleeding gums  ALLERGY AND IMMUNOLOGIC: No hives or eczema    Vital Signs Last 24 Hrs  T(C): 36.6 (29 Mar 2020 05:37), Max: 36.8 (28 Mar 2020 23:52)  T(F): 97.8 (29 Mar 2020 05:37), Max: 98.3 (28 Mar 2020 23:52)  HR: 70 (29 Mar 2020 05:37) (69 - 92)  BP: 122/68 (29 Mar 2020 05:37) (116/58 - 129/85)  BP(mean): --  RR: 18 (29 Mar 2020 05:37) (18 - 26)  SpO2: 98% (29 Mar 2020 05:37) (95% - 100%)    PHYSICAL EXAM:  GENERAL: NAD, Awake, Alert   HEAD:  Atraumatic, Normocephalic  EYES: EOMI, PERRLA, conjunctiva and sclera clear  ENMT: No tonsillar erythema, exudates, or enlargement; Moist mucous membranes  NECK: Supple, No JVD, Normal thyroid  NERVOUS SYSTEM:  Alert & Awake,  Motor Strength 5/5 B/L upper and lower extremities  CHEST/LUNG: Decreased  to auscultation bilaterally; No rales, rhonchi, wheezing, or rubs  HEART: Regular rate and rhythm; No murmurs, rubs, or gallops  ABDOMEN: Soft, Nontender, Nondistended; Bowel sounds present  EXTREMITIES:  2+ Peripheral Pulses, No clubbing, cyanosis  SKIN: No rashes or lesions    LABS:                        8.3    8.16  )-----------( 157      ( 29 Mar 2020 07:51 )             25.8     29 Mar 2020 07:51    141    |  108    |  23     ----------------------------<  116    4.4     |  28     |  1.40     Ca    8.8        29 Mar 2020 07:51    TPro  6.5    /  Alb  3.0    /  TBili  0.4    /  DBili  x      /  AST  69     /  ALT  40     /  AlkPhos  57     29 Mar 2020 07:51    PT/INR - ( 28 Mar 2020 20:46 )   PT: 13.2 sec;   INR: 1.17 ratio         PTT - ( 29 Mar 2020 07:51 )  PTT:> 200 sec  CAPILLARY BLOOD GLUCOSE      POCT Blood Glucose.: 136 mg/dL (29 Mar 2020 08:27)    BLOOD CULTURE    RADIOLOGY & ADDITIONAL TESTS:    Imaging Personally Reviewed:  [ ] YES     Consultant(s) Notes Reviewed:      Care Discussed with Consultants/Other Providers:

## 2020-03-29 NOTE — CONSULT NOTE ADULT - SUBJECTIVE AND OBJECTIVE BOX
[facility] - Raman Ceja, Aryan Brothers, lSoane, David, Evan Bloom  Office Number: [phone]    Initial Consult Note: This is an 87 y/o M with CAD, remote CABG and stents, PPM, HTN, HLD, Afib (on AC) s/p Watchman device, DM2 presented s/p fall    CHIEF COMPLAINT: Patient is a 88y old  Male who presents with a chief complaint of collapse, SOB, r/o COVID - unclear date of sx onset, CODE: DNR/DNI (29 Mar 2020 10:15)      HPI:  89yo M, with PMH/o CAD s/p CABG x3 (30 yrs ago), s/p 4 stents placed (10 yrs ago), s/p PPM ([employer]tr[employer]), HTN, HLD, atrial fibrillation (on Eliquis) s/p Watchman device placement (2017), and non-insulin dependent T2DM, c/o weakness that caused him to fall to his knees today. Per patient, he felt lightheaded and lost his balance upon standing when he was getting dressed for bed. He denies head injury and states he was unable to get up on his own. Upon EMS arrival, pt was reportedly unresponsive, having received chest compressions from a bystander despite not being in arrest, and noted to have SpO2 ~70s on RA which improved with supplemental O2. Of note, patient was recently admitted to [facility] (2/20-2/26) for PNA, after which he continued to feel weak. He describes being chronically SOB requiring 2L NC O2 at night and that his current respiratory status is unchanged from baseline. Denies recent sick contacts or recent travel. Additionally admits to 1x nonbloody diarrhea at time of fall. Currently endorsing non-radiating "terrible" central chest pain, worsened with inspiration, that began when placed on the stretcher by EMS. Otherwise denies fever, chills, headache, vision changes, numbness/tingling, current palpitations, abd pain, N/V, dysuria, hematuria, skin changes, or new myalgia/arthralgia. Patient expresses wishes to be DNR/DNI, MOLST completed.    In the ED  VS: T 96.8 HR 92 /59 RR 26 SpO2 95% on 3L NC ->97% on 2L NC  Significant labs include lactate 4.3, H/H 9.5/30.2, lymphocytes 0.88, NLR 6.8, BUN/Cr 28/1.6, glucose 182, .  CXR: based on my preliminary read with comparison to CXR on 2/20, there appears to be a new LLL opacification with interval improvement of R-sided infiltrate  CT head: No gross acute intracranial hemorrhage, mass effect, or acute osseous fracture. 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  CT Cspine: No acute cervical spine fracture or evidence of traumatic malalignment. Cervical spondylosis.  EKG: AV dual-paced w/ occasional PVC    Patient received 2L NS. (28 Mar 2020 21:58)    PAST MEDICAL & SURGICAL HISTORY:  Coronary artery disease involving native coronary artery of native heart, angina presence unspecified  Persistent atrial fibrillation: wwas on eliquis, stopped due to gi bleed workup underway  Type 2 diabetes mellitus without complication, without long-term current use of insulin  Hyperlipemia  MI, old  Hypertension  Sepsis: sec to prostate bx  S/P coronary artery bypass graft x 3  Pacemaker  Presence of Watchman left atrial appendage closure device  H/O prostate biopsy  History of cholecystectomy  Artificial cardiac pacemaker  Stented coronary artery  H/O coronary angioplasty: 4 stents  S/P CABG x 3    SOCIAL HISTORY:  No tobacco, ethanol, or drug abuse.  FAMILY HISTORY:  Family history of cardiomegaly: mother  Family history of breast cancer in sister  FH: MI (myocardial infarction): in father due to freon exposure in work in [place]    No family history of acute MI or sudden cardiac death.  MEDICATIONS  (STANDING):  aspirin  chewable 81 milliGRAM(s) Oral daily  atorvastatin 80 milliGRAM(s) Oral at bedtime  budesonide  80 MICROgram(s)/formoterol 4.5 MICROgram(s) Inhaler 2 Puff(s) Inhalation two times a day  carvedilol 12.5 milliGRAM(s) Oral every 12 hours  dextrose 5%. 1000 milliLiter(s) (50 mL/Hr) IV Continuous <Continuous>  dextrose 50% Injectable 12.5 Gram(s) IV Push once  dextrose 50% Injectable 25 Gram(s) IV Push once  dextrose 50% Injectable 25 Gram(s) IV Push once  ferrous    sulfate 325 milliGRAM(s) Oral daily  furosemide   Injectable 40 milliGRAM(s) IV Push once  heparin  Infusion.  Unit(s)/Hr (17 mL/Hr) IV Continuous <Continuous>  insulin lispro (HumaLOG) corrective regimen sliding scale   SubCutaneous Before meals and at bedtime  montelukast 10 milliGRAM(s) Oral daily  pantoprazole    Tablet 40 milliGRAM(s) Oral two times a day  sertraline 75 milliGRAM(s) Oral daily  tamsulosin 0.8 milliGRAM(s) Oral at bedtime    MEDICATIONS  (PRN):  ALBUTerol    90 MICROgram(s) HFA Inhaler 2 Puff(s) Inhalation every 6 hours PRN Shortness of Breath and/or Wheezing  dextrose 40% Gel 15 Gram(s) Oral once PRN Blood Glucose LESS THAN 70 milliGRAM(s)/deciliter  glucagon  Injectable 1 milliGRAM(s) IntraMuscular once PRN Glucose LESS THAN 70 milligrams/deciliter  heparin  Injectable 7500 Unit(s) IV Push every 6 hours PRN For aPTT less than 40  heparin  Injectable 3500 Unit(s) IV Push every 6 hours PRN For aPTT between 40 - 57    Allergies    No Known Allergies    Intolerances      REVIEW OF SYSTEMS:  CONSTITUTIONAL: No weakness, fevers or chills  EYES/ENT: No visual changes;  No vertigo or throat pain   NECK: No pain or stiffness  RESPIRATORY: No cough, wheezing, hemoptysis; No shortness of breath  CARDIOVASCULAR: No chest pain or palpitations  GASTROINTESTINAL: No abdominal pain. No nausea, vomiting, or hematemesis; No diarrhea or constipation. No melena or hematochezia.  GENITOURINARY: No dysuria, frequency or hematuria  NEUROLOGICAL: No numbness or weakness  SKIN: No itching or rash  All other review of systems is negative unless indicated above  VITAL SIGNS:   Vital Signs Last 24 Hrs  T(C): 36.6 (29 Mar 2020 05:37), Max: 36.8 (28 Mar 2020 23:52)  T(F): 97.8 (29 Mar 2020 05:37), Max: 98.3 (28 Mar 2020 23:52)  HR: 70 (29 Mar 2020 05:37) (69 - 92)  BP: 122/68 (29 Mar 2020 05:37) (116/58 - 129/85)  BP(mean): --  RR: 18 (29 Mar 2020 05:37) (18 - 26)  SpO2: 98% (29 Mar 2020 05:37) (95% - 100%)  I&O's Summary    On Exam:  Constitutional: NAD, alert and oriented x 3  Lungs:  Non-labored, breath sounds are clear bilaterally, No wheezing, rales or rhonchi  Cardiovascular: RRR.  S1 and S2 positive.  No murmurs, rubs, gallops or clicks  Gastrointestinal: Bowel Sounds present, soft, nontender.   Lymph: No peripheral edema. No cervical lymphadenopathy.  Neurological: Alert, no focal deficits  Skin: No rashes or ulcers   Psych:  Mood & affect appropriate.    LABS: All Labs Reviewed:                        8.3    8.16  )-----------( 157      ( 29 Mar 2020 07:51 )             25.8                         9.5    7.85  )-----------( 193      ( 28 Mar 2020 20:46 )             30.2     29 Mar 2020 07:51    141    |  108    |  23     ----------------------------<  116    4.4     |  28     |  1.40   28 Mar 2020 20:46    140    |  105    |  28     ----------------------------<  182    4.7     |  26     |  1.60     Ca    8.8        29 Mar 2020 07:51  Ca    9.2        28 Mar 2020 20:46    TPro  6.5    /  Alb  3.0    /  TBili  0.4    /  DBili  x      /  AST  69     /  ALT  40     /  AlkPhos  57     29 Mar 2020 07:51  TPro  7.5    /  Alb  3.4    /  TBili  0.5    /  DBili  x      /  AST  114    /  ALT  51     /  AlkPhos  66     28 Mar 2020 20:46    PT/INR - ( 28 Mar 2020 20:46 )   PT: 13.2 sec;   INR: 1.17 ratio         PTT - ( 29 Mar 2020 07:51 )  PTT:> 200 sec  CARDIAC MARKERS ( 29 Mar 2020 07:51 )  .100 ng/mL / x     / 76 U/L / x     / 2.5 ng/mL  CARDIAC MARKERS ( 29 Mar 2020 01:53 )  .152 ng/mL / x     / 61 U/L / x     / 2.4 ng/mL  CARDIAC MARKERS ( 28 Mar 2020 20:46 )  .031 ng/mL / x     / 40 U/L / x     / x          Blood Culture:   03-28 @ 20:46  Pro Bnp [id number]    RADIOLOGY:  < from: TTE Echo Doppler w/o Cont (02.25.20 @ 14:29) >     EXAM:  ECHO TTE WO CON COMP W DOPPLR      PROCEDURE DATE:  02/25/2020      INTERPRETATION:  INDICATION: Edema  Sonographer KL    Blood Pressure 112/65    Height 183 cm     Weight   90.7 kg        BSA 2.13 sq m    Dimensions:    LA 5.2       Normal Values: 2.0 - 4.0 cm    Ao 4.4        Normal Values: 2.0 - 3.8 cm  SEPTUM 1.8       Normal Values: 0.6 - 1.2 cm  PWT 1.4       Normal Values: 0.6 - 1.1 cm  LVIDd 4.4         Normal Values: 3.0 - 5.6 cm  LVIDs 3.3         Normal Values: 1.8 - 4.0cm      OBSERVATIONS:  Technically difficult study  Mitral Valve: normal, mild MR.  Aortic Valve/Aorta: Calcified trileaflet aortic valve with normal opening. Trace AI. Dilated aortic root of 4.4 cm  Tricuspid Valve: normal with trace TR.  Pulmonic Valve: Trace PI  Left Atrium: Enlarged  Right Atrium: Not well-visualized  Left Ventricle: normal LV size with low-normal systolic function, estimated LVEF of 50-55%. LVH  Right Ventricle: Grossly normal size and systolic function. Device wire is notedwithin the right heart  Pericardium/Pleura: normal, no significant pericardial effusion.  Pulmonary/RV Pressure: estimated PA systolic pressure of 19 mmHg     Conclusion:   Technically difficult study  Concentric LVH with low-normal systolic function, estimated LVEF of 50-55%.   Grossly normal RV size and systolic function. Device wire seen within the right heart  Left atrial enlargement  Calcified trileaflet aortic valve, trace AI.   Dilated aortic root at 4.4 cm  Mild MR   Trace TR.    Estimated PA systolic pressure of 19 mmHg.   No significant pericardial effusion.      JORGE L BLOOM   This document has been electronically signed. Feb 26 2020  8:57AM      < end of copied text >    < from: Xray Chest 1 View-PORTABLE IMMEDIATE (03.28.20 @ 20:49) >    EXAM:  XR CHEST PORTABLE IMMED 1V                            PROCEDURE DATE:  03/28/2020          INTERPRETATION:  Exam Date: 3/28/2020 8:49 PM    History: Shortness of breath, fever, cough    Technique: Single frontal portable view of the chest with comparison to  2/20/2020    Findings:    Left-sided pacemaker. Prior sternotomy. Heart is enlarged. Minimal pulmonary vascular congestion. No focal consolidation. Apices unremarkable. Degenerative changes of the visualized osseous structures.    Impression:    Minimal pulmonary vascular congestion. No focal consolidation.    COLIN GUILLEN M.D., ATTENDING RADIOLOGIST  This document has been electronically signed. Mar 29 2020  9:07AM     < end of copied text >    EKG: [facility] - Raman Ceja, Deneen, Aryan, Sloane, David, Evan Bloom  Office Number: [phone]    Initial Consult Note: This is an 89 y/o M with CAD, remote CABG and stents, PPM, HTN, HLD, Afib (on AC) s/p Watchman device, DM2, COPD on nocturnal NC presented s/p fall.  Patient states, he felt weak, dizzy, and lightheadedness last night and when he was changing clothes, he lost his balance and fell.  He has a 24-aide who was not able to get him up.  Next thing he remembers, the fire department was around him.  Unable to recall if chest compression waas done.  However, per chart, bystanders performed chest compression on him because he was found to be unresponsive, though, was not in cardiac arrest.  He was found to be hypoxic at 70's on RA with improvement with O2.  Denies fever, chills, cough, diarrhea, change in appetite, or weight loss.  Denies any sick contact.  Admits to have chest pain on deep inspiration, otherwise, no complaints.  On NC but denies any SOB, LEE, or orthopnea.  Of note, patient was recently discharged for Pna.    In the ED, he presneted with elevated pro-BNP (4219), D-Dimer (1890), Lactate (4.3), creatinine (1.6), CxR with mild pulmonary congestion.  Troponin leak noted but CPK's remained flat.  EKG showed AVpaced    CHIEF COMPLAINT: Patient is a 88y old  Male who presents with a chief complaint of collapse, SOB, r/o COVID - unclear date of sx onset, CODE: DNR/DNI (29 Mar 2020 10:15)    HPI:  87yo M, with PMH/o CAD s/p CABG x3 (30 yrs ago), s/p 4 stents placed (10 yrs ago), s/p PPM ([employer]), HTN, HLD, atrial fibrillation (on Eliquis) s/p Watchman device placement (2017), and non-insulin dependent T2DM, c/o weakness that caused him to fall to his knees today. Per patient, he felt lightheaded and lost his balance upon standing when he was getting dressed for bed. He denies head injury and states he was unable to get up on his own. Upon EMS arrival, pt was reportedly unresponsive, having received chest compressions from a bystander despite not being in arrest, and noted to have SpO2 ~70s on RA which improved with supplemental O2. Of note, patient was recently admitted to [facility] (2/20-2/26) for PNA, after which he continued to feel weak. He describes being chronically SOB requiring 2L NC O2 at night and that his current respiratory status is unchanged from baseline. Denies recent sick contacts or recent travel. Additionally admits to 1x nonbloody diarrhea at time of fall. Currently endorsing non-radiating "terrible" central chest pain, worsened with inspiration, that began when placed on the stretcher by EMS. Otherwise denies fever, chills, headache, vision changes, numbness/tingling, current palpitations, abd pain, N/V, dysuria, hematuria, skin changes, or new myalgia/arthralgia. Patient expresses wishes to be DNR/DNI, MOLST completed.    In the ED  VS: T 96.8 HR 92 /59 RR 26 SpO2 95% on 3L NC ->97% on 2L NC  Significant labs include lactate 4.3, H/H 9.5/30.2, lymphocytes 0.88, NLR 6.8, BUN/Cr 28/1.6, glucose 182, .  CXR: based on my preliminary read with comparison to CXR on 2/20, there appears to be a new LLL opacification with interval improvement of R-sided infiltrate  CT head: No gross acute intracranial hemorrhage, mass effect, or acute osseous fracture. 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  CT Cspine: No acute cervical spine fracture or evidence of traumatic malalignment. Cervical spondylosis.  EKG: AV dual-paced w/ occasional PVC    Patient received 2L NS. (28 Mar 2020 21:58)    PAST MEDICAL & SURGICAL HISTORY:  Coronary artery disease involving native coronary artery of native heart, angina presence unspecified  Persistent atrial fibrillation: wwas on eliquis, stopped due to gi bleed workup underway  Type 2 diabetes mellitus without complication, without long-term current use of insulin  Hyperlipemia  MI, old  Hypertension  Sepsis: sec to prostate bx  S/P coronary artery bypass graft x 3  Pacemaker  Presence of Watchman left atrial appendage closure device  H/O prostate biopsy  History of cholecystectomy  Artificial cardiac pacemaker  Stented coronary artery  H/O coronary angioplasty: 4 stents  S/P CABG x 3    SOCIAL HISTORY:  No tobacco, ethanol, or drug abuse.  FAMILY HISTORY:  Family history of cardiomegaly: mother  Family history of breast cancer in sister  FH: MI (myocardial infarction): in father due to freon exposure in work in [place]    No family history of acute MI or sudden cardiac death.  MEDICATIONS  (STANDING):  aspirin  chewable 81 milliGRAM(s) Oral daily  atorvastatin 80 milliGRAM(s) Oral at bedtime  budesonide  80 MICROgram(s)/formoterol 4.5 MICROgram(s) Inhaler 2 Puff(s) Inhalation two times a day  carvedilol 12.5 milliGRAM(s) Oral every 12 hours  dextrose 5%. 1000 milliLiter(s) (50 mL/Hr) IV Continuous <Continuous>  dextrose 50% Injectable 12.5 Gram(s) IV Push once  dextrose 50% Injectable 25 Gram(s) IV Push once  dextrose 50% Injectable 25 Gram(s) IV Push once  ferrous    sulfate 325 milliGRAM(s) Oral daily  furosemide   Injectable 40 milliGRAM(s) IV Push once  heparin  Infusion.  Unit(s)/Hr (17 mL/Hr) IV Continuous <Continuous>  insulin lispro (HumaLOG) corrective regimen sliding scale   SubCutaneous Before meals and at bedtime  montelukast 10 milliGRAM(s) Oral daily  pantoprazole    Tablet 40 milliGRAM(s) Oral two times a day  sertraline 75 milliGRAM(s) Oral daily  tamsulosin 0.8 milliGRAM(s) Oral at bedtime    MEDICATIONS  (PRN):  ALBUTerol    90 MICROgram(s) HFA Inhaler 2 Puff(s) Inhalation every 6 hours PRN Shortness of Breath and/or Wheezing  dextrose 40% Gel 15 Gram(s) Oral once PRN Blood Glucose LESS THAN 70 milliGRAM(s)/deciliter  glucagon  Injectable 1 milliGRAM(s) IntraMuscular once PRN Glucose LESS THAN 70 milligrams/deciliter  heparin  Injectable 7500 Unit(s) IV Push every 6 hours PRN For aPTT less than 40  heparin  Injectable 3500 Unit(s) IV Push every 6 hours PRN For aPTT between 40 - 57    Allergies    No Known Allergies    Intolerances      REVIEW OF SYSTEMS:  CONSTITUTIONAL: No weakness, fevers or chills  EYES/ENT: No visual changes;  No vertigo or throat pain   NECK: No pain or stiffness  RESPIRATORY: No cough, wheezing, hemoptysis; No shortness of breath  CARDIOVASCULAR: No chest pain or palpitations  GASTROINTESTINAL: No abdominal pain. No nausea, vomiting, or hematemesis; No diarrhea or constipation. No melena or hematochezia.  GENITOURINARY: No dysuria, frequency or hematuria  NEUROLOGICAL: No numbness or weakness  SKIN: No itching or rash  All other review of systems is negative unless indicated above  VITAL SIGNS:   Vital Signs Last 24 Hrs  T(C): 36.6 (29 Mar 2020 05:37), Max: 36.8 (28 Mar 2020 23:52)  T(F): 97.8 (29 Mar 2020 05:37), Max: 98.3 (28 Mar 2020 23:52)  HR: 70 (29 Mar 2020 05:37) (69 - 92)  BP: 122/68 (29 Mar 2020 05:37) (116/58 - 129/85)  BP(mean): --  RR: 18 (29 Mar 2020 05:37) (18 - 26)  SpO2: 98% (29 Mar 2020 05:37) (95% - 100%)  I&O's Summary    On Exam:  Constitutional: NAD, alert and oriented x 3. Obese  Lungs:  Non-labored, breath sounds are diminished bilaterally, No wheezing or rhonchi. +rales bases R>L  Cardiovascular: RRR.  S1 and S2 positive.  No murmurs, rubs, gallops or clicks  Gastrointestinal: Bowel Sounds present, soft, nontender.   Lymph: No peripheral edema. No cervical lymphadenopathy.  Neurological: Alert, no focal deficits  Skin: No rashes or ulcers   Psych:  Mood & affect appropriate.    LABS: All Labs Reviewed:                        8.3    8.16  )-----------( 157      ( 29 Mar 2020 07:51 )             25.8                         9.5    7.85  )-----------( 193      ( 28 Mar 2020 20:46 )             30.2     29 Mar 2020 07:51    141    |  108    |  23     ----------------------------<  116    4.4     |  28     |  1.40   28 Mar 2020 20:46    140    |  105    |  28     ----------------------------<  182    4.7     |  26     |  1.60     Ca    8.8        29 Mar 2020 07:51  Ca    9.2        28 Mar 2020 20:46    TPro  6.5    /  Alb  3.0    /  TBili  0.4    /  DBili  x      /  AST  69     /  ALT  40     /  AlkPhos  57     29 Mar 2020 07:51  TPro  7.5    /  Alb  3.4    /  TBili  0.5    /  DBili  x      /  AST  114    /  ALT  51     /  AlkPhos  66     28 Mar 2020 20:46    PT/INR - ( 28 Mar 2020 20:46 )   PT: 13.2 sec;   INR: 1.17 ratio         PTT - ( 29 Mar 2020 07:51 )  PTT:> 200 sec  CARDIAC MARKERS ( 29 Mar 2020 07:51 )  .100 ng/mL / x     / 76 U/L / x     / 2.5 ng/mL  CARDIAC MARKERS ( 29 Mar 2020 01:53 )  .152 ng/mL / x     / 61 U/L / x     / 2.4 ng/mL  CARDIAC MARKERS ( 28 Mar 2020 20:46 )  .031 ng/mL / x     / 40 U/L / x     / x          Blood Culture:   03-28 @ 20:46  Pro Bnp [id number]    RADIOLOGY:  < from: TTE Echo Doppler w/o Cont (02.25.20 @ 14:29) >     EXAM:  ECHO TTE WO CON COMP W DOPPLR      PROCEDURE DATE:  02/25/2020      INTERPRETATION:  INDICATION: Edema  Sonographer KL    Blood Pressure 112/65    Height 183 cm     Weight   90.7 kg        BSA 2.13 sq m    Dimensions:    LA 5.2       Normal Values: 2.0 - 4.0 cm    Ao 4.4        Normal Values: 2.0 - 3.8 cm  SEPTUM 1.8       Normal Values: 0.6 - 1.2 cm  PWT 1.4       Normal Values: 0.6 - 1.1 cm  LVIDd 4.4         Normal Values: 3.0 - 5.6 cm  LVIDs 3.3         Normal Values: 1.8 - 4.0cm      OBSERVATIONS:  Technically difficult study  Mitral Valve: normal, mild MR.  Aortic Valve/Aorta: Calcified trileaflet aortic valve with normal opening. Trace AI. Dilated aortic root of 4.4 cm  Tricuspid Valve: normal with trace TR.  Pulmonic Valve: Trace PI  Left Atrium: Enlarged  Right Atrium: Not well-visualized  Left Ventricle: normal LV size with low-normal systolic function, estimated LVEF of 50-55%. LVH  Right Ventricle: Grossly normal size and systolic function. Device wire is notedwithin the right heart  Pericardium/Pleura: normal, no significant pericardial effusion.  Pulmonary/RV Pressure: estimated PA systolic pressure of 19 mmHg     Conclusion:   Technically difficult study  Concentric LVH with low-normal systolic function, estimated LVEF of 50-55%.   Grossly normal RV size and systolic function. Device wire seen within the right heart  Left atrial enlargement  Calcified trileaflet aortic valve, trace AI.   Dilated aortic root at 4.4 cm  Mild MR   Trace TR.    Estimated PA systolic pressure of 19 mmHg.   No significant pericardial effusion.      JORGE L BLOOM   This document has been electronically signed. Feb 26 2020  8:57AM      < end of copied text >    < from: Xray Chest 1 View-PORTABLE IMMEDIATE (03.28.20 @ 20:49) >    EXAM:  XR CHEST PORTABLE IMMED 1V                            PROCEDURE DATE:  03/28/2020          INTERPRETATION:  Exam Date: 3/28/2020 8:49 PM    History: Shortness of breath, fever, cough    Technique: Single frontal portable view of the chest with comparison to  2/20/2020    Findings:    Left-sided pacemaker. Prior sternotomy. Heart is enlarged. Minimal pulmonary vascular congestion. No focal consolidation. Apices unremarkable. Degenerative changes of the visualized osseous structures.    Impression:    Minimal pulmonary vascular congestion. No focal consolidation.    COLIN GUILLEN M.D., ATTENDING RADIOLOGIST  This document has been electronically signed. Mar 29 2020  9:07AM     < end of copied text >    EKG: AV paced [facility] - Raman Ceja, Deneen, Aryan, Sloane, David, Evan Bloom  Office Number: [phone]    Initial Consult Note: This is an 87 y/o M with CAD, remote CABG and stents, PPM, HTN, HLD, Afib (on AC) s/p Watchman device, DM2, COPD on nocturnal NC presented s/p fall.  Patient states, he felt weak, dizzy, and lightheadedness last night and when he was changing clothes, he lost his balance and fell.  He has a 24-aide who was not able to get him up.  Next thing he remembers, the fire department was around him.  Unable to recall if chest compression waas done.  However, per chart, bystanders performed chest compression on him because he was found to be unresponsive, though, was not in cardiac arrest.  He was found to be hypoxic at 70's on RA with improvement with O2.  Denies fever, chills, cough, diarrhea, change in appetite, or weight loss.  Denies any sick contact.  Admits to have chest pain on deep inspiration, otherwise, no complaints.  On NC but denies any SOB, LEE, or orthopnea.  Of note, patient was recently discharged for Pna.    In the ED, he presneted with elevated pro-BNP (4219), D-Dimer (1890), Lactate (4.3), creatinine (1.6), CxR with mild pulmonary congestion.  Troponin leak noted but CPK's remained flat.  EKG showed AVpaced    CHIEF COMPLAINT: Patient is a 88y old  Male who presents with a chief complaint of collapse, SOB, r/o COVID - unclear date of sx onset, CODE: DNR/DNI (29 Mar 2020 10:15)    HPI:  89yo M, with PMH/o CAD s/p CABG x3 (30 yrs ago), s/p 4 stents placed (10 yrs ago), s/p PPM ([employer]tr[employer]), HTN, HLD, atrial fibrillation (on Eliquis) s/p Watchman device placement (2017), and non-insulin dependent T2DM, c/o weakness that caused him to fall to his knees today. Per patient, he felt lightheaded and lost his balance upon standing when he was getting dressed for bed.  Admitted to his private cardio that he remembers hitting his chest on the chair in the process of falling.  He denies head injury and states he was unable to get up on his own. Upon EMS arrival, pt was reportedly unresponsive, having received chest compressions from a bystander despite not being in arrest, and noted to have SpO2 ~70s on RA which improved with supplemental O2. Of note, patient was recently admitted to [facility] (2/20-2/26) for PNA, after which he continued to feel weak. He describes being chronically SOB requiring 2L NC O2 at night and that his current respiratory status is unchanged from baseline. Denies recent sick contacts or recent travel. Additionally admits to 1x nonbloody diarrhea at time of fall. Currently endorsing non-radiating "terrible" central chest pain, worsened with inspiration, that began when placed on the stretcher by EMS. Otherwise denies fever, chills, headache, vision changes, numbness/tingling, current palpitations, abd pain, N/V, dysuria, hematuria, skin changes, or new myalgia/arthralgia. Patient expresses wishes to be DNR/DNI, MOLST completed.    In the ED  VS: T 96.8 HR 92 /59 RR 26 SpO2 95% on 3L NC ->97% on 2L NC  Significant labs include lactate 4.3, H/H 9.5/30.2, lymphocytes 0.88, NLR 6.8, BUN/Cr 28/1.6, glucose 182, .  CXR: based on my preliminary read with comparison to CXR on 2/20, there appears to be a new LLL opacification with interval improvement of R-sided infiltrate  CT head: No gross acute intracranial hemorrhage, mass effect, or acute osseous fracture. 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  CT Cspine: No acute cervical spine fracture or evidence of traumatic malalignment. Cervical spondylosis.  EKG: AV dual-paced w/ occasional PVC    Patient received 2L NS. (28 Mar 2020 21:58)    PAST MEDICAL & SURGICAL HISTORY:  Coronary artery disease involving native coronary artery of native heart, angina presence unspecified  Persistent atrial fibrillation: wwas on eliquis, stopped due to gi bleed workup underway  Type 2 diabetes mellitus without complication, without long-term current use of insulin  Hyperlipemia  MI, old  Hypertension  Sepsis: sec to prostate bx  S/P coronary artery bypass graft x 3  Pacemaker  Presence of Watchman left atrial appendage closure device  H/O prostate biopsy  History of cholecystectomy  Artificial cardiac pacemaker  Stented coronary artery  H/O coronary angioplasty: 4 stents  S/P CABG x 3    SOCIAL HISTORY:  No tobacco, ethanol, or drug abuse.  FAMILY HISTORY:  Family history of cardiomegaly: mother  Family history of breast cancer in sister  FH: MI (myocardial infarction): in father due to freon exposure in work in [place]    No family history of acute MI or sudden cardiac death.  MEDICATIONS  (STANDING):  aspirin  chewable 81 milliGRAM(s) Oral daily  atorvastatin 80 milliGRAM(s) Oral at bedtime  budesonide  80 MICROgram(s)/formoterol 4.5 MICROgram(s) Inhaler 2 Puff(s) Inhalation two times a day  carvedilol 12.5 milliGRAM(s) Oral every 12 hours  dextrose 5%. 1000 milliLiter(s) (50 mL/Hr) IV Continuous <Continuous>  dextrose 50% Injectable 12.5 Gram(s) IV Push once  dextrose 50% Injectable 25 Gram(s) IV Push once  dextrose 50% Injectable 25 Gram(s) IV Push once  ferrous    sulfate 325 milliGRAM(s) Oral daily  furosemide   Injectable 40 milliGRAM(s) IV Push once  heparin  Infusion.  Unit(s)/Hr (17 mL/Hr) IV Continuous <Continuous>  insulin lispro (HumaLOG) corrective regimen sliding scale   SubCutaneous Before meals and at bedtime  montelukast 10 milliGRAM(s) Oral daily  pantoprazole    Tablet 40 milliGRAM(s) Oral two times a day  sertraline 75 milliGRAM(s) Oral daily  tamsulosin 0.8 milliGRAM(s) Oral at bedtime    MEDICATIONS  (PRN):  ALBUTerol    90 MICROgram(s) HFA Inhaler 2 Puff(s) Inhalation every 6 hours PRN Shortness of Breath and/or Wheezing  dextrose 40% Gel 15 Gram(s) Oral once PRN Blood Glucose LESS THAN 70 milliGRAM(s)/deciliter  glucagon  Injectable 1 milliGRAM(s) IntraMuscular once PRN Glucose LESS THAN 70 milligrams/deciliter  heparin  Injectable 7500 Unit(s) IV Push every 6 hours PRN For aPTT less than 40  heparin  Injectable 3500 Unit(s) IV Push every 6 hours PRN For aPTT between 40 - 57    Allergies    No Known Allergies    Intolerances      REVIEW OF SYSTEMS:  CONSTITUTIONAL: No weakness, fevers or chills  EYES/ENT: No visual changes;  No vertigo or throat pain   NECK: No pain or stiffness  RESPIRATORY: No cough, wheezing, hemoptysis; No shortness of breath  CARDIOVASCULAR: No chest pain or palpitations  GASTROINTESTINAL: No abdominal pain. No nausea, vomiting, or hematemesis; No diarrhea or constipation. No melena or hematochezia.  GENITOURINARY: No dysuria, frequency or hematuria  NEUROLOGICAL: No numbness or weakness  SKIN: No itching or rash  All other review of systems is negative unless indicated above  VITAL SIGNS:   Vital Signs Last 24 Hrs  T(C): 36.6 (29 Mar 2020 05:37), Max: 36.8 (28 Mar 2020 23:52)  T(F): 97.8 (29 Mar 2020 05:37), Max: 98.3 (28 Mar 2020 23:52)  HR: 70 (29 Mar 2020 05:37) (69 - 92)  BP: 122/68 (29 Mar 2020 05:37) (116/58 - 129/85)  BP(mean): --  RR: 18 (29 Mar 2020 05:37) (18 - 26)  SpO2: 98% (29 Mar 2020 05:37) (95% - 100%)  I&O's Summary    On Exam:  Constitutional: NAD, alert and oriented x 3. Obese  Lungs:  Non-labored, breath sounds are diminished bilaterally, No wheezing or rhonchi. +rales bases R>L  Cardiovascular: RRR.  S1 and S2 positive.  No murmurs, rubs, gallops or clicks  Gastrointestinal: Bowel Sounds present, soft, nontender.   Lymph: No peripheral edema. No cervical lymphadenopathy.  Neurological: Alert, no focal deficits  Skin: No rashes or ulcers   Psych:  Mood & affect appropriate.    LABS: All Labs Reviewed:                        8.3    8.16  )-----------( 157      ( 29 Mar 2020 07:51 )             25.8                         9.5    7.85  )-----------( 193      ( 28 Mar 2020 20:46 )             30.2     29 Mar 2020 07:51    141    |  108    |  23     ----------------------------<  116    4.4     |  28     |  1.40   28 Mar 2020 20:46    140    |  105    |  28     ----------------------------<  182    4.7     |  26     |  1.60     Ca    8.8        29 Mar 2020 07:51  Ca    9.2        28 Mar 2020 20:46    TPro  6.5    /  Alb  3.0    /  TBili  0.4    /  DBili  x      /  AST  69     /  ALT  40     /  AlkPhos  57     29 Mar 2020 07:51  TPro  7.5    /  Alb  3.4    /  TBili  0.5    /  DBili  x      /  AST  114    /  ALT  51     /  AlkPhos  66     28 Mar 2020 20:46    PT/INR - ( 28 Mar 2020 20:46 )   PT: 13.2 sec;   INR: 1.17 ratio         PTT - ( 29 Mar 2020 07:51 )  PTT:> 200 sec  CARDIAC MARKERS ( 29 Mar 2020 07:51 )  .100 ng/mL / x     / 76 U/L / x     / 2.5 ng/mL  CARDIAC MARKERS ( 29 Mar 2020 01:53 )  .152 ng/mL / x     / 61 U/L / x     / 2.4 ng/mL  CARDIAC MARKERS ( 28 Mar 2020 20:46 )  .031 ng/mL / x     / 40 U/L / x     / x          Blood Culture:   03-28 @ 20:46  Pro Bnp [id number]    RADIOLOGY:  < from: TTE Echo Doppler w/o Cont (02.25.20 @ 14:29) >     EXAM:  ECHO TTE WO CON COMP W DOPPLR      PROCEDURE DATE:  02/25/2020      INTERPRETATION:  INDICATION: Edema  Sonographer KL    Blood Pressure 112/65    Height 183 cm     Weight   90.7 kg        BSA 2.13 sq m    Dimensions:    LA 5.2       Normal Values: 2.0 - 4.0 cm    Ao 4.4        Normal Values: 2.0 - 3.8 cm  SEPTUM 1.8       Normal Values: 0.6 - 1.2 cm  PWT 1.4       Normal Values: 0.6 - 1.1 cm  LVIDd 4.4         Normal Values: 3.0 - 5.6 cm  LVIDs 3.3         Normal Values: 1.8 - 4.0cm      OBSERVATIONS:  Technically difficult study  Mitral Valve: normal, mild MR.  Aortic Valve/Aorta: Calcified trileaflet aortic valve with normal opening. Trace AI. Dilated aortic root of 4.4 cm  Tricuspid Valve: normal with trace TR.  Pulmonic Valve: Trace PI  Left Atrium: Enlarged  Right Atrium: Not well-visualized  Left Ventricle: normal LV size with low-normal systolic function, estimated LVEF of 50-55%. LVH  Right Ventricle: Grossly normal size and systolic function. Device wire is notedwithin the right heart  Pericardium/Pleura: normal, no significant pericardial effusion.  Pulmonary/RV Pressure: estimated PA systolic pressure of 19 mmHg     Conclusion:   Technically difficult study  Concentric LVH with low-normal systolic function, estimated LVEF of 50-55%.   Grossly normal RV size and systolic function. Device wire seen within the right heart  Left atrial enlargement  Calcified trileaflet aortic valve, trace AI.   Dilated aortic root at 4.4 cm  Mild MR   Trace TR.    Estimated PA systolic pressure of 19 mmHg.   No significant pericardial effusion.      JORGE L BLOOM   This document has been electronically signed. Feb 26 2020  8:57AM      < end of copied text >    < from: Xray Chest 1 View-PORTABLE IMMEDIATE (03.28.20 @ 20:49) >    EXAM:  XR CHEST PORTABLE IMMED 1V                            PROCEDURE DATE:  03/28/2020          INTERPRETATION:  Exam Date: 3/28/2020 8:49 PM    History: Shortness of breath, fever, cough    Technique: Single frontal portable view of the chest with comparison to  2/20/2020    Findings:    Left-sided pacemaker. Prior sternotomy. Heart is enlarged. Minimal pulmonary vascular congestion. No focal consolidation. Apices unremarkable. Degenerative changes of the visualized osseous structures.    Impression:    Minimal pulmonary vascular congestion. No focal consolidation.    COLIN GUILLEN M.D., ATTENDING RADIOLOGIST  This document has been electronically signed. Mar 29 2020  9:07AM     < end of copied text >    EKG: AV paced

## 2020-03-29 NOTE — CONSULT NOTE ADULT - ATTENDING COMMENTS
The patient was personally seen and examined, in addition to being examined and evaluated by housestaff NP.  All elements of the note were edited where appropriate.    pacemaker interrogation unremarkable-no arrhythmia  probably orthostatic in the setting of anemia, age, etc  in the setting of covid would prefer that he be dc if possible so as to minimize risk of infection  d/w dr heath

## 2020-03-29 NOTE — PROGRESS NOTE ADULT - PROBLEM SELECTOR PLAN 3
- Per pt, has chronic SOB that is currently unchanged  - Notes use of home O2, 2L NC at night while asleep  - Possibly 2/2 current anemia vs suspected viral illness vs afib vs PE in setting of syncope  - Rx'd Lasix during most recent admission d/t apparent volume overload, though PE findings today do not support hypervolemia despite elevated BNP. Hold Lasix for now.  - Will initiate heparin gtt to cover for possible ACS vs PE in setting of elevated age-adjusted d-dimer  - F/u b/l LE doppler  - Monitor continuous pulse ox and continue with supp O2 prn - Per pt, has chronic SOB that is currently unchanged  - Notes use of home O2, 2L NC at night while asleep  - Possibly 2/2 current anemia vs suspected viral illness vs afib vs PE in setting of syncope  - Rx'd Lasix during most recent admission d/t apparent volume overload, though PE findings today do not support hypervolemia despite elevated BNP. Hold Lasix for now.  - Hold  heparin gtt due to anemia, r/o GIB. Check Stool for OB.   - If Stool for OB Negative, will resume home Xarelto. Per PCP Dr. Harrison, patient is SOB at baseline and is on O2 at home for chronic resp failure and COPD.   - Monitor continuous pulse ox and continue with supp O2 prn

## 2020-03-29 NOTE — DISCHARGE NOTE PROVIDER - HOSPITAL COURSE
FROM ADMISSION H+P:     HPI:    89yo M, with PMH/o CAD s/p CABG x3 (30 yrs ago), s/p 4 stents placed (10 yrs ago), s/p PPM ([employer]), HTN, HLD, atrial fibrillation (on Eliquis) s/p Watchman device placement (2017), and non-insulin dependent T2DM, c/o weakness that caused him to fall to his knees today. Per patient, he felt lightheaded and lost his balance upon standing when he was getting dressed for bed. He denies head injury and states he was unable to get up on his own. Upon EMS arrival, pt was reportedly unresponsive, having received chest compressions from a bystander despite not being in arrest, and noted to have SpO2 ~70s on RA which improved with supplemental O2. Of note, patient was recently admitted to [facility] (2/20-2/26) for PNA, after which he continued to feel weak. He describes being chronically SOB requiring 2L NC O2 at night and that his current respiratory status is unchanged from baseline. Denies recent sick contacts or recent travel. Additionally admits to 1x nonbloody diarrhea at time of fall. Currently endorsing non-radiating "terrible" central chest pain, worsened with inspiration, that began when placed on the stretcher by EMS. Otherwise denies fever, chills, headache, vision changes, numbness/tingling, current palpitations, abd pain, N/V, dysuria, hematuria, skin changes, or new myalgia/arthralgia. Patient expresses wishes to be DNR/DNI, MOLST completed.        In the ED    VS: T 96.8 HR 92 /59 RR 26 SpO2 95% on 3L NC ->97% on 2L NC    Significant labs include lactate 4.3, H/H 9.5/30.2, lymphocytes 0.88, NLR 6.8, BUN/Cr 28/1.6, glucose 182, .    CXR: based on my preliminary read with comparison to CXR on 2/20, there appears to be a new LLL opacification with interval improvement of R-sided infiltrate    CT head: No gross acute intracranial hemorrhage, mass effect, or acute osseous fracture. 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    CT Cspine: No acute cervical spine fracture or evidence of traumatic malalignment. Cervical spondylosis.    EKG: AV dual-paced w/ occasional PVC    Patient received 2L NS.         ---    HOSPITAL COURSE: Patient transferred to general medical floor for further management. COVID19 PCR PENDING. Patient was managed with supportive treatment including supplemental O2. RVP panel resulted as negative for co-infections. Urine legionella and strep pneumoniae PENDING. Blood cultures x2 and urine cultures PENDING. Inflammatory markers (ferritin, ESR, CRP, LDH) were trended. Procalcitonin was <.05. For syncopal episodes, cardiology was consulted. Troponins mildly elevated .152 -> .100 however CKs remained flat with no clear ischemic symptoms. Atypical chest pain likely 2/2 chest compressions prior to presentation. CTA to r/o PE unable to be performed due to CKD. Patient's respiratory status was monitored closely.          Patient was medically optimized and improved clinically throughout hospital. Patient seen and examined on day of discharge.        Physical Exam:         Patient is medically stable and cleared for discharge to ____ with outpatient follow up.        ---    CONSULTANTS:     Cardiology: Dr. Baeza    Pulmonology: Dr. Winters     ---    TIME SPENT:    The total amount of time spent reviewing the hospital notes, laboratory values, imaging findings, assessing/counseling the patient, discussing with consultant physicians, social work, nursing staff was -- minutes        ---    Primary care provider was made aware of plan for discharge:      [  ] NO     [  ] YES FROM ADMISSION H+P:     HPI:    89yo M, with PMH/o CAD s/p CABG x3 (30 yrs ago), s/p 4 stents placed (10 yrs ago), s/p PPM ([employer]tr[employer]), HTN, HLD, atrial fibrillation (on Eliquis) s/p Watchman device placement (2017), and non-insulin dependent T2DM, c/o weakness that caused him to fall to his knees today. Per patient, he felt lightheaded and lost his balance upon standing when he was getting dressed for bed. He denies head injury and states he was unable to get up on his own. Upon EMS arrival, pt was reportedly unresponsive, having received chest compressions from a bystander despite not being in arrest, and noted to have SpO2 ~70s on RA which improved with supplemental O2. Of note, patient was recently admitted to [facility] (2/20-2/26) for PNA, after which he continued to feel weak. He describes being chronically SOB requiring 2L NC O2 at night and that his current respiratory status is unchanged from baseline. Denies recent sick contacts or recent travel. Additionally admits to 1x nonbloody diarrhea at time of fall. Currently endorsing non-radiating "terrible" central chest pain, worsened with inspiration, that began when placed on the stretcher by EMS. Otherwise denies fever, chills, headache, vision changes, numbness/tingling, current palpitations, abd pain, N/V, dysuria, hematuria, skin changes, or new myalgia/arthralgia. Patient expresses wishes to be DNR/DNI, MOLST completed.        In the ED    VS: T 96.8 HR 92 /59 RR 26 SpO2 95% on 3L NC ->97% on 2L NC    Significant labs include lactate 4.3, H/H 9.5/30.2, lymphocytes 0.88, NLR 6.8, BUN/Cr 28/1.6, glucose 182, .    CXR: based on my preliminary read with comparison to CXR on 2/20, there appears to be a new LLL opacification with interval improvement of R-sided infiltrate    CT head: No gross acute intracranial hemorrhage, mass effect, or acute osseous fracture. 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    CT Cspine: No acute cervical spine fracture or evidence of traumatic malalignment. Cervical spondylosis.    EKG: AV dual-paced w/ occasional PVC    Patient received 2L NS.         ---    HOSPITAL COURSE: Patient transferred to general medical floor for further management. COVID19 PCR PENDING. Patient was managed with supportive treatment including supplemental O2. RVP panel resulted as negative for co-infections. Inflammatory markers (ferritin, ESR, CRP, LDH) and Procalcitonin was performed. For syncopal episodes, cardiology was consulted. Troponins mildly elevated .152 -> .100 however CKs remained flat with no clear ischemic symptoms. Atypical chest pain likely 2/2 chest compressions prior to presentation. CTA to r/o PE unable to be performed due to CKD. Full dose heparin was initially started given concern for possible ACS, discontinued after no further evidence on hospital course. Pt's home medication regimen was continued and adjusted as appropriate. Cardio was consulted, pt found with no ischemic symptoms and was continued on his routine regimen. Suspected Covid19 viral etiology, pt's respiratory status was monitored closely and supplemental o2 was given to maintain adequate saturation. Pt responded well to above treatment and interventions. Pt seen and examined day of discharge. Clinically stable and medically optimized for discharge home.        Physical Exam:     Vital Signs Last 24 Hrs    T(C): 36.8 (30 Mar 2020 05:00), Max: 36.9 (30 Mar 2020 02:39)    T(F): 98.2 (30 Mar 2020 05:00), Max: 98.5 (30 Mar 2020 02:39)    HR: 68 (30 Mar 2020 05:00) (68 - 75)    BP: 130/67 (30 Mar 2020 05:00) (129/70 - 151/85)    BP(mean): --    RR: 18 (30 Mar 2020 08:40) (17 - 18)    SpO2: 96% (30 Mar 2020 08:40) (96% - 100%)        Physical Exam:*****    General: Well developed, well nourished, NAD    HEENT: NCAT, PERRLA, EOMI bl, moist mucous membranes     Neck: Supple, nontender, no mass    Neurology: A&Ox3, nonfocal, CN II-XII grossly intact, sensation intact, no gait abnormalities     Respiratory: CTA B/L, No W/R/R    CV: RRR, +S1/S2, no murmurs, rubs or gallops    Abdominal: Soft, NT, ND +BSx4    Extremities: No C/C/E, + peripheral pulses    MSK: Normal ROM, no joint erythema or warmth, no joint swelling     Skin: warm, dry, normal color, no rash or abnormal lesions            ---    CONSULTANTS:     Cardiology: Dr. Baeza    Pulmonology: Dr. Winters     ---    TIME SPENT:    The total amount of time spent reviewing the hospital notes, laboratory values, imaging findings, assessing/counseling the patient, discussing with consultant physicians, social work, nursing staff was -- minutes        ---    Primary care provider was made aware of plan for discharge:      [  ] NO     [  ] YES

## 2020-03-29 NOTE — PROGRESS NOTE ADULT - ATTENDING COMMENTS
D/w PCP, Dr. Ramos Harrison, [phone]. Patient on po Xarelto 2.5mg po bid at home and hom2 O2, has COPD. COVID result still pending. Cardio and pulm to see patient. Unable to perform CTA to r/u PE due to CKD. Will need to r/o COVID first before can send for VQ scan or bilat LE doppler per protocol. Will continue IV Heparin drip for now. D/w PCP at length. D/w PCP, Dr. Ramos Harrison, [phone]. Patient on po Xarelto 2.5mg po bid at home and hom2 O2, has COPD. COVID result still pending. Cardio and pulm to see patient. Unable to perform CTA to r/u PE due to CKD. Will need to r/o COVID first before can send for VQ scan or bilat LE doppler per protocol. Stopped IV heprin , since low h/h and risk of GIB. Check Stool for OB and resume Xarelto if negative. PCP wants patient to be discharged once COVID testing comes back negative D/w PCP, Dr. Ramos Harrison, [phone]. Patient on po Xarelto 2.5mg po bid at home and hom2 O2, has COPD. COVID result still pending. Cardio and pulm to see patient. Unable to perform CTA to r/u PE due to CKD. Will need to r/o COVID first before can send for VQ scan or bilat LE doppler per protocol. Stopped IV heprin , since low h/h and risk of GIB. Check Stool for OB and resume Xarelto if negative. PCP wants patient to be discharged once COVID testing comes back negative. D/w son also

## 2020-03-29 NOTE — DISCHARGE NOTE PROVIDER - NSDCMRMEDTOKEN_GEN_ALL_CORE_FT
aspirin 81 mg oral tablet, chewable: 1 tab(s) orally once a day  Breo Ellipta 100 mcg-25 mcg/inh inhalation powder: 1 puff(s) inhaled once a day  carvedilol 25 mg oral tablet: 1 tab(s) orally 2 times a day  Centrum Silver oral tablet: 1 tab(s) orally once a day  ferrous sulfate 200 mg (65 mg elemental iron) oral tablet: 1 tab(s) orally once a day  Flomax 0.4 mg oral capsule: 1 cap(s) orally 2 times a day  furosemide 40 mg oral tablet: 1 tab(s) orally once a day  metFORMIN 500 mg oral tablet: 1 tab(s) orally 2 times a day  montelukast 10 mg oral tablet: 1 tab(s) orally once a day  Nitro-Dur 0.4 mg/hr transdermal film, extended release: 1 patch transdermal once a day  ProAir HFA 90 mcg/inh inhalation aerosol: 2 puff(s) inhaled every 6 hours  Protonix 40 mg oral delayed release tablet: 1 tab(s) orally 2 times a day  rosuvastatin 20 mg oral tablet: 1 tab(s) orally once a day  Xarelto 2.5 mg oral tablet: 1 tab(s) orally 2 times a day  Zoloft 50 mg oral tablet: 1.5 tab(s) orally once a day acetaminophen 325 mg oral tablet: 2 tab(s) orally every 6 hours, As needed, Temp greater or equal to 38C (100.4F), Mild Pain (1 - 3)  aspirin 81 mg oral tablet, chewable: 1 tab(s) orally once a day  Breo Ellipta 100 mcg-25 mcg/inh inhalation powder: 1 puff(s) inhaled once a day  carvedilol 25 mg oral tablet: 1 tab(s) orally 2 times a day  Centrum Silver oral tablet: 1 tab(s) orally once a day  ferrous sulfate 200 mg (65 mg elemental iron) oral tablet: 1 tab(s) orally once a day  Flomax 0.4 mg oral capsule: 1 cap(s) orally 2 times a day  furosemide 40 mg oral tablet: 1 tab(s) orally once a day  metFORMIN 500 mg oral tablet: 1 tab(s) orally 2 times a day  montelukast 10 mg oral tablet: 1 tab(s) orally once a day  Nitro-Dur 0.4 mg/hr transdermal film, extended release: 1 patch transdermal once a day  ProAir HFA 90 mcg/inh inhalation aerosol: 2 puff(s) inhaled every 6 hours  Protonix 40 mg oral delayed release tablet: 1 tab(s) orally 2 times a day  rosuvastatin 20 mg oral tablet: 1 tab(s) orally once a day  Xarelto 2.5 mg oral tablet: 1 tab(s) orally 2 times a day  Zoloft 50 mg oral tablet: 1.5 tab(s) orally once a day acetaminophen 325 mg oral tablet: 2 tab(s) orally every 6 hours, As needed, Temp greater or equal to 38C (100.4F), Mild Pain (1 - 3)  aspirin 81 mg oral tablet, chewable: 1 tab(s) orally once a day  Breo Ellipta 100 mcg-25 mcg/inh inhalation powder: 1 puff(s) inhaled once a day  carvedilol 25 mg oral tablet: 1 tab(s) orally 2 times a day  Centrum Silver oral tablet: 1 tab(s) orally once a day  ferrous sulfate 200 mg (65 mg elemental iron) oral tablet: 1 tab(s) orally once a day  Flomax 0.4 mg oral capsule: 1 cap(s) orally 2 times a day  furosemide 40 mg oral tablet: 1 tab(s) orally once a day  metFORMIN 500 mg oral tablet: 1 tab(s) orally 2 times a day  montelukast 10 mg oral tablet: 1 tab(s) orally once a day  Nitro-Dur 0.4 mg/hr transdermal film, extended release: 1 patch transdermal once a day  ProAir HFA 90 mcg/inh inhalation aerosol: 2 puff(s) inhaled every 6 hours  Protonix 40 mg oral delayed release tablet: 1 tab(s) orally 2 times a day  rosuvastatin 20 mg oral tablet: 1 tab(s) orally once a day  Zoloft 50 mg oral tablet: 1.5 tab(s) orally once a day

## 2020-03-29 NOTE — PROGRESS NOTE ADULT - PROBLEM SELECTOR PLAN 5
- Stable, chronic  - Currently rate-controlled  - Continue with home Carvedilol 12.5mg BID with hold parameters  - Previously on Xarelto 2.5mg BID, will hold at this time as s/p Watchman device placement in 2017 - Stable, chronic  - Currently rate-controlled  - Continue with home Carvedilol 12.5mg BID with hold parameters  -  s/p Watchman device placement in 2017  -Holding Xarelto, until stool for OB comes back negative

## 2020-03-29 NOTE — CONSULT NOTE ADULT - ASSESSMENT
Assessment/Plan:      Christen Ma DNP, ANP-C  Cardiology  Spectra #[phone] Assessment/Plan:  87 y/o M with CAD, remote CABG and stents, PPM, HTN, HLD, Afib (on AC) s/p Watchman device, DM2, COPD on nocturnal NC presented s/p fall.  Patient states, he felt weak, dizzy, and lightheadedness last night and when he was changing clothes, he lost his balance and fell.  He has a 24-aide who was not able to get him up.  Next thing he remembers, the fire department was around him.  Unable to recall if chest compression waas done.  However, per chart, bystanders performed chest compression on him because he was found to be unresponsive, though, was not in cardiac arrest.  He was found to be hypoxic at 70's on RA with improvement with O2.  Denies fever, chills, cough, diarrhea, change in appetite, or weight loss.  Denies any sick contact.  Admits to have chest pain on deep inspiration, otherwise, no complaints.  On NC but denies any SOB, LEE, or orthopnea.  Of note, patient was recently discharged for Pna.    In the ED, he presneted with elevated pro-BNP (4219), D-Dimer (1890), Lactate (4.3), creatinine (1.6), CxR with mild pulmonary congestion.  Troponin leak noted but CPK's remained flat.  EKG showed AVpaced    CAD with remote CABG and stents  - He presented with troponin leak and atypical CP  - He has no clear ischemic symptoms.  Instead, his CP is pleuritic and muscular in nature from chest compression he received PTA  - Though, his troponins were very mildly elevated, his CPK's remained flat.  No need to trend.  This does not represent a arin ACS  - His EKG showed 100% AV paced.  - Continue ASA, BB, and statin  - He had a recent TTE on file (2/2020) showing EF 50-55%, LVH, normal RVSF, dilalted aortic root at 4.4 cm but no significant valvular disease.  No need to repeat  - He follows with Dr. Gonzalez ([facility]) and had recently seen him (12 weeks ago)    Afib (Chronic) s/p PPM  - He is AV paced on tele  - Continue Coreg  - Monitor electrolytes, replete to keep K>4 and Mag>2  - He is s/p Watchman device but denies any Hx of bleeding  - Takes Xarelto at home.  Continue Heparin for now.  Unsure why he remains on AC with Watchman    s/p Fall  - Patient's fall was likely from his weakness/dizziness  - Unsure if he truly was at one point unresponsive  - Will interrogate PPM  - Fall precaution  - Sepsis workup  - Follow up COVID PCR    HTN  - Continue Coreg  - BP is stable and controlled  - His CxR showed moderate vascular congestion, Lasix 40 mg IV x 1 ordered by Primary    COPD  - On nocturnal NC.  Has chronic LEE  - COVID testing in progress  - RVP panel negative   DM2  - Per Primary    HLD  - Continue statin  - Can reduce to 40 mg     GILBERT on CKD  - Continue to trend renal indices  - Avoid nephrotoxics    Christen Ma DNP, ANP-C  Cardiology  Spectra #[phone] Assessment/Plan:  87 y/o M with CAD, remote CABG and stents, PPM, HTN, HLD, Afib (on AC) s/p Watchman device, DM2, COPD on nocturnal NC presented s/p fall.  Patient states, he felt weak, dizzy, and lightheadedness last night and when he was changing clothes, he lost his balance and fell.  He has a 24-aide who was not able to get him up.  Next thing he remembers, the fire department was around him.  Unable to recall if chest compression waas done.  However, per chart, bystanders performed chest compression on him because he was found to be unresponsive, though, was not in cardiac arrest.  He was found to be hypoxic at 70's on RA with improvement with O2.  Denies fever, chills, cough, diarrhea, change in appetite, or weight loss.  Denies any sick contact.  Admits to have chest pain on deep inspiration, otherwise, no complaints.  On NC but denies any SOB, LEE, or orthopnea.  Of note, patient was recently discharged for Pna.    In the ED, he presneted with elevated pro-BNP (4219), D-Dimer (1890), Lactate (4.3), creatinine (1.6), CxR with mild pulmonary congestion.  Troponin leak noted but CPK's remained flat.  EKG showed AVpaced    CAD with remote CABG and stents  - He presented with troponin leak and atypical CP  - He has no clear ischemic symptoms.  Instead, his CP is pleuritic and muscular in nature from chest compression he received PTA  - Though, his troponins were very mildly elevated, his CPK's remained flat.  No need to trend.  This does not represent a arin ACS  - His EKG showed 100% AV paced.  - Continue ASA, BB, and statin  - He had a recent TTE on file (2/2020) showing EF 50-55%, LVH, normal RVSF, dilalted aortic root at 4.4 cm but no significant valvular disease.  No need to repeat  - He follows with Dr. Gonzalez ([facility]) and had recently seen him (12 weeks ago).  I have spoke to him over the phone and he prefers that patient be discharged as soon as possible as he is asymptomatic    Afib (Chronic) s/p PPM  - He is AV paced on tele  - Continue Coreg  - Monitor electrolytes, replete to keep K>4 and Mag>2  - He is s/p Watchman device for Hx of GIB  - Takes low dose Xarelto at home (Vascular dose).  Hold Heparin in the setting of possible GIB (dropped Hgb by 1 gm)    s/p Fall  - Patient's fall was likely from his weakness/dizziness  - Unsure if he truly was at one point unresponsive  - Will interrogate PPM  - Fall precaution  - Sepsis workup  - Follow up COVID PCR    HTN  - Continue Coreg  - BP is stable and controlled  - His CxR showed moderate vascular congestion, Lasix 40 mg IV x 1 ordered by Primary    COPD  - On nocturnal NC.  Has chronic LEE  - COVID testing in progress  - RVP panel negative   DM2  - Per Primary    Anemia  - He has a known Hx of GIB per Dr. Gonzalez  - He follows with outpatient GI and was planned to undergo a GI w/u  - He presented with H/H of 9.5/30.3 which trended down to 8.3/25.8  - Recommend FOBT  - Can hold Heparin drip.  If negative FOBT, can resume home dose Xarelto    HLD  - Continue statin  - Can reduce to 40 mg     GILBERT on CKD  - Continue to trend renal indices  - Avoid nephrotoxics    Christen Ma DNP, ANP-C  Cardiology  Spectra #[phone] Assessment/Plan:  87 y/o M with CAD, remote CABG and stents, PPM, HTN, HLD, Afib (on AC) s/p Watchman device, DM2, COPD on nocturnal NC presented s/p fall.  Patient states, he felt weak, dizzy, and lightheadedness last night and when he was changing clothes, he lost his balance and fell.  Admitted to his private cardio that he remembers hitting his chest on the chair in the process of falling.  He has a 24-aide who was not able to get him up.  Next thing he remembers, the fire department was around him.  Unable to recall if chest compression waas done.  However, per chart, bystanders performed chest compression on him because he was found to be unresponsive, though, was not in cardiac arrest.  He was found to be hypoxic at 70's on RA with improvement with O2.  Denies fever, chills, cough, diarrhea, change in appetite, or weight loss.  Denies any sick contact.  Admits to have chest pain on deep inspiration, otherwise, no complaints.  On NC but denies any SOB, LEE, or orthopnea.  Of note, patient was recently discharged for Pna.    In the ED, he presneted with elevated pro-BNP (4219), D-Dimer (1890), Lactate (4.3), creatinine (1.6), CxR with mild pulmonary congestion.  Troponin leak noted but CPK's remained flat.  EKG showed AVpaced    CAD with remote CABG and stents  - He presented with troponin leak and atypical CP  - He has no clear ischemic symptoms.  Instead, his CP is pleuritic and muscular in nature from chest compression he received PTA  - Though, his troponins were very mildly elevated, his CPK's remained flat.  No need to trend.  This does not represent a arin ACS  - His EKG showed 100% AV paced.  - Continue ASA, BB, and statin  - He had a recent TTE on file (2/2020) showing EF 50-55%, LVH, normal RVSF, dilalted aortic root at 4.4 cm but no significant valvular disease.  No need to repeat  - He follows with Dr. Gonzalez ([facility]) and had recently seen him (12 weeks ago).  I have spoke to him over the phone and he prefers that patient be discharged as soon as possible as he is asymptomatic    Afib (Chronic) s/p PPM  - He is AV paced on tele  - Continue Coreg  - Monitor electrolytes, replete to keep K>4 and Mag>2  - He is s/p Watchman device for Hx of GIB  - Takes low dose Xarelto at home (Vascular dose).  Hold Heparin in the setting of possible GIB (dropped Hgb by 1 gm)    s/p Fall  - Patient's fall was likely from his weakness/dizziness  - Unsure if he truly was at one point unresponsive  - PPM interrogated at bedside, transmitted to [employer].  Awaiting for remote reading from rep  - Fall precaution  - Sepsis workup  - Follow up COVID PCR    HTN  - Continue Coreg  - BP is stable and controlled  - His CxR showed moderate vascular congestion, Lasix 40 mg IV x 1 ordered by Primary    COPD  - On nocturnal NC.  Has chronic LEE  - COVID testing in progress  - RVP panel negative   DM2  - Per Primary    Anemia  - He has a known Hx of GIB per Dr. Gonzalez  - He follows with outpatient GI and was planned to undergo a GI w/u  - He presented with H/H of 9.5/30.3 which trended down to 8.3/25.8  - Recommend FOBT  - Can hold Heparin drip.  If negative FOBT, can resume home dose Xarelto    HLD  - Continue statin  - Can reduce to 40 mg     GILBERT on CKD  - Continue to trend renal indices  - Avoid nephrotoxics    Christen Ma DNP, ANP-C  Cardiology  Spectra #[phone]

## 2020-03-29 NOTE — DISCHARGE NOTE PROVIDER - CARE PROVIDER_API CALL
Elpidio Mcnamara (DO)  Cardiology; Internal Medicine  [address] [place] [address], [address]  [place] [address]  Phone: [phone]  Fax: [phone]  Established Patient  Follow Up Time: 1 week

## 2020-03-29 NOTE — DISCHARGE NOTE PROVIDER - NSDCFUADDINST_GEN_ALL_CORE_FT
Hold Xarelto until seen by PCP in 1-2 days  Any worsening of shortness of breath call PCP and/or come to ED immediately  COVID- you and your PCP must follow up results

## 2020-03-29 NOTE — PROGRESS NOTE ADULT - PROBLEM SELECTOR PLAN 1
- Pt p/w weakness, questionable syncope, and SOB  - Tachycardic and tachypneic upon ED presentation with suspected viral illness  - Lactate 4.3, s/p 2L NS in the ED, f/u repeat lactate  - CXR demonstrating interval development of new LLL infiltrate compared to CXR on 2/20 based on my prelim read, pending official report  - F/u blood cx and urine cx  - Currently afebrile and w/o leukocytosis, monitor daily temperature curve and daily CBC w/ diff  - Bilateral opacifications on CXR in setting of suspected COVID19 viral illness given clinical presentation, labs significant for lymphopenia, NLR 6.8  - Full RVP Panel negative  - May use O2 NC, Venturi Mask, NRB as needed depending on oxygen demand, titrate supp O2 to SpO2 >93%  - Avoid HFNC/NIPPV/aerosolizing procedures i.e. nebulizations  - Avoid NSAIDs, use tylenol PRN fevers  - Maintain strict isolation precautions, use proper PPE   - Check urine legionella/strep antigens, check procalcitonin  - Daily CBC with diff to follow eosinophils, lymphocytes and neutrophils; daily CK  - Given risk factors (age and comorbidities): check LDH, CRP, ferritin, ESR, PT/PTT, CK, lactate, troponin  - Baseline EKG AV paced with occasional PVC (grossly unchanged from EKG on 2/20), monitor for cardiac decompensation, monitor telemetry  - Monitor respiratory status closely, NEWS2 score 6 on admission, medium risk, reassess q1h prn  - Code Status: DNR/DNI

## 2020-03-29 NOTE — PROGRESS NOTE ADULT - PROBLEM SELECTOR PLAN 6
- Chronic  - Currently c/o pleuritic chest pain though EKG not showing signs of active ischemia, chest pain likely 2/2 reported chest compressions performed at home  - Will initiate heparin gtt to cover for possible ACS vs PE

## 2020-03-29 NOTE — PROGRESS NOTE ADULT - PROBLEM/PLAN-1
POSTPARTUM FRENCH Gallardo  Date:    
DISPLAY PLAN FREE TEXT

## 2020-03-29 NOTE — DISCHARGE NOTE PROVIDER - NSDCCPCAREPLAN_GEN_ALL_CORE_FT
PRINCIPAL DISCHARGE DIAGNOSIS  Diagnosis: Syncope and collapse  Assessment and Plan of Treatment: This was likely due to a viral infection. Full cardiac workup was done and found to be negative.  You were monitored closely over your hospital stay and oxygen was provided as needed for you to maintain adequate saturation. Your Covid19 test is still pending and you will be informed once it results. Please continue to self-quaratine after you are discharged home.      SECONDARY DISCHARGE DIAGNOSES  Diagnosis: Shortness of breath  Assessment and Plan of Treatment: This is likely multifactorial given your history of COPD and viral infection. Your breathing/respiratory status was closely monitored over your hospital course and oxygen was provided as needed. Your home inhalers were continued during your stay. PRINCIPAL DISCHARGE DIAGNOSIS  Diagnosis: Syncope and collapse  Assessment and Plan of Treatment: This was likely due to a viral infection. Full cardiac workup was done and found to be negative.  You were monitored closely over your hospital stay and oxygen was provided as needed for you to maintain adequate saturation. Your Covid19 test is still pending. As per your PCP, son and you preferred to be discharge prior to results- Call Center will be informed you once it results. Please continue to self-quaratine after you are discharged home.      SECONDARY DISCHARGE DIAGNOSES  Diagnosis: Shortness of breath  Assessment and Plan of Treatment: This is likely multifactorial given your history of COPD and viral infection. Your breathing/respiratory status was closely monitored over your hospital course and oxygen was provided as needed. Your home inhalers were continued during your stay.  Any worening Shortness of breathe-call your PCP and/or return to ED immediately

## 2020-03-29 NOTE — ED ADULT NURSE REASSESSMENT NOTE - NS ED NURSE REASSESS COMMENT FT1
Called the floor nurse Wanda is in break will be back any moment will call back in 5 mins.
Patient complaining of midchest pain with pain level 8/10 called Dr. Pompa EKG done waiting for order.
Pt. complaining of sharp chest pain when trying to reposition himself in bed independently and coughing. Assistance provided to patient. MD aware, see orders.

## 2020-03-30 ENCOUNTER — TRANSCRIPTION ENCOUNTER (OUTPATIENT)
Age: 85
End: 2020-03-30

## 2020-03-30 VITALS — OXYGEN SATURATION: 93 % | RESPIRATION RATE: 18 BRPM

## 2020-03-30 LAB
ALBUMIN SERPL ELPH-MCNC: 3.1 G/DL — LOW (ref 3.3–5)
ALP SERPL-CCNC: 60 U/L — SIGNIFICANT CHANGE UP (ref 40–120)
ALT FLD-CCNC: 33 U/L — SIGNIFICANT CHANGE UP (ref 12–78)
ANION GAP SERPL CALC-SCNC: 4 MMOL/L — LOW (ref 5–17)
AST SERPL-CCNC: 44 U/L — HIGH (ref 15–37)
BASOPHILS # BLD AUTO: 0.04 K/UL — SIGNIFICANT CHANGE UP (ref 0–0.2)
BASOPHILS NFR BLD AUTO: 0.5 % — SIGNIFICANT CHANGE UP (ref 0–2)
BILIRUB SERPL-MCNC: 0.6 MG/DL — SIGNIFICANT CHANGE UP (ref 0.2–1.2)
BUN SERPL-MCNC: 24 MG/DL — HIGH (ref 7–23)
CALCIUM SERPL-MCNC: 9.4 MG/DL — SIGNIFICANT CHANGE UP (ref 8.5–10.1)
CHLORIDE SERPL-SCNC: 104 MMOL/L — SIGNIFICANT CHANGE UP (ref 96–108)
CK MB BLD-MCNC: 2 % — SIGNIFICANT CHANGE UP (ref 0–3.5)
CK MB CFR SERPL CALC: 1.3 NG/ML — SIGNIFICANT CHANGE UP (ref 0–3.6)
CK SERPL-CCNC: 64 U/L — SIGNIFICANT CHANGE UP (ref 26–308)
CO2 SERPL-SCNC: 29 MMOL/L — SIGNIFICANT CHANGE UP (ref 22–31)
CREAT SERPL-MCNC: 1.2 MG/DL — SIGNIFICANT CHANGE UP (ref 0.5–1.3)
CRP SERPL-MCNC: 1.28 MG/DL — HIGH (ref 0–0.4)
CULTURE RESULTS: SIGNIFICANT CHANGE UP
D DIMER BLD IA.RAPID-MCNC: 1113 NG/ML DDU — HIGH
EOSINOPHIL # BLD AUTO: 0.13 K/UL — SIGNIFICANT CHANGE UP (ref 0–0.5)
EOSINOPHIL NFR BLD AUTO: 1.8 % — SIGNIFICANT CHANGE UP (ref 0–6)
ERYTHROCYTE [SEDIMENTATION RATE] IN BLOOD: 44 MM/HR — HIGH (ref 0–20)
FERRITIN SERPL-MCNC: 172 NG/ML — SIGNIFICANT CHANGE UP (ref 30–400)
GLUCOSE SERPL-MCNC: 175 MG/DL — HIGH (ref 70–99)
HCT VFR BLD CALC: 28.9 % — LOW (ref 39–50)
HCT VFR BLD CALC: 29.2 % — LOW (ref 39–50)
HGB BLD-MCNC: 9.5 G/DL — LOW (ref 13–17)
HGB BLD-MCNC: 9.6 G/DL — LOW (ref 13–17)
IMM GRANULOCYTES NFR BLD AUTO: 0.4 % — SIGNIFICANT CHANGE UP (ref 0–1.5)
LACTATE SERPL-SCNC: 1.2 MMOL/L — SIGNIFICANT CHANGE UP (ref 0.7–2)
LDH SERPL L TO P-CCNC: 245 U/L — HIGH (ref 50–242)
LYMPHOCYTES # BLD AUTO: 0.71 K/UL — LOW (ref 1–3.3)
LYMPHOCYTES # BLD AUTO: 9.6 % — LOW (ref 13–44)
MCHC RBC-ENTMCNC: 29.5 PG — SIGNIFICANT CHANGE UP (ref 27–34)
MCHC RBC-ENTMCNC: 29.7 PG — SIGNIFICANT CHANGE UP (ref 27–34)
MCHC RBC-ENTMCNC: 32.9 GM/DL — SIGNIFICANT CHANGE UP (ref 32–36)
MCHC RBC-ENTMCNC: 32.9 GM/DL — SIGNIFICANT CHANGE UP (ref 32–36)
MCV RBC AUTO: 89.8 FL — SIGNIFICANT CHANGE UP (ref 80–100)
MCV RBC AUTO: 90.4 FL — SIGNIFICANT CHANGE UP (ref 80–100)
MONOCYTES # BLD AUTO: 0.54 K/UL — SIGNIFICANT CHANGE UP (ref 0–0.9)
MONOCYTES NFR BLD AUTO: 7.3 % — SIGNIFICANT CHANGE UP (ref 2–14)
NEUTROPHILS # BLD AUTO: 5.91 K/UL — SIGNIFICANT CHANGE UP (ref 1.8–7.4)
NEUTROPHILS NFR BLD AUTO: 80.4 % — HIGH (ref 43–77)
NRBC # BLD: 0 /100 WBCS — SIGNIFICANT CHANGE UP (ref 0–0)
NRBC # BLD: 0 /100 WBCS — SIGNIFICANT CHANGE UP (ref 0–0)
PLATELET # BLD AUTO: 152 K/UL — SIGNIFICANT CHANGE UP (ref 150–400)
PLATELET # BLD AUTO: 154 K/UL — SIGNIFICANT CHANGE UP (ref 150–400)
POTASSIUM SERPL-MCNC: 4.4 MMOL/L — SIGNIFICANT CHANGE UP (ref 3.5–5.3)
POTASSIUM SERPL-SCNC: 4.4 MMOL/L — SIGNIFICANT CHANGE UP (ref 3.5–5.3)
PROCALCITONIN SERPL-MCNC: <0.05 — SIGNIFICANT CHANGE UP (ref 0–0.04)
PROT SERPL-MCNC: 7 G/DL — SIGNIFICANT CHANGE UP (ref 6–8.3)
RBC # BLD: 3.22 M/UL — LOW (ref 4.2–5.8)
RBC # BLD: 3.23 M/UL — LOW (ref 4.2–5.8)
RBC # FLD: 14.8 % — HIGH (ref 10.3–14.5)
RBC # FLD: 14.8 % — HIGH (ref 10.3–14.5)
SARS-COV-2 RNA SPEC QL NAA+PROBE: SIGNIFICANT CHANGE UP
SODIUM SERPL-SCNC: 137 MMOL/L — SIGNIFICANT CHANGE UP (ref 135–145)
SPECIMEN SOURCE: SIGNIFICANT CHANGE UP
TROPONIN I SERPL-MCNC: 0.03 NG/ML — SIGNIFICANT CHANGE UP (ref 0.01–0.04)
WBC # BLD: 7.36 K/UL — SIGNIFICANT CHANGE UP (ref 3.8–10.5)
WBC # BLD: 8.37 K/UL — SIGNIFICANT CHANGE UP (ref 3.8–10.5)
WBC # FLD AUTO: 7.36 K/UL — SIGNIFICANT CHANGE UP (ref 3.8–10.5)
WBC # FLD AUTO: 8.37 K/UL — SIGNIFICANT CHANGE UP (ref 3.8–10.5)

## 2020-03-30 PROCEDURE — 99285 EMERGENCY DEPT VISIT HI MDM: CPT | Mod: 25

## 2020-03-30 PROCEDURE — 87633 RESP VIRUS 12-25 TARGETS: CPT

## 2020-03-30 PROCEDURE — 85379 FIBRIN DEGRADATION QUANT: CPT

## 2020-03-30 PROCEDURE — 86140 C-REACTIVE PROTEIN: CPT

## 2020-03-30 PROCEDURE — 87635 SARS-COV-2 COVID-19 AMP PRB: CPT

## 2020-03-30 PROCEDURE — 93005 ELECTROCARDIOGRAM TRACING: CPT

## 2020-03-30 PROCEDURE — 83036 HEMOGLOBIN GLYCOSYLATED A1C: CPT

## 2020-03-30 PROCEDURE — 87040 BLOOD CULTURE FOR BACTERIA: CPT

## 2020-03-30 PROCEDURE — 82962 GLUCOSE BLOOD TEST: CPT

## 2020-03-30 PROCEDURE — 72125 CT NECK SPINE W/O DYE: CPT

## 2020-03-30 PROCEDURE — 82553 CREATINE MB FRACTION: CPT

## 2020-03-30 PROCEDURE — 70450 CT HEAD/BRAIN W/O DYE: CPT

## 2020-03-30 PROCEDURE — 87449 NOS EACH ORGANISM AG IA: CPT

## 2020-03-30 PROCEDURE — 80053 COMPREHEN METABOLIC PANEL: CPT

## 2020-03-30 PROCEDURE — P9016: CPT

## 2020-03-30 PROCEDURE — 85652 RBC SED RATE AUTOMATED: CPT

## 2020-03-30 PROCEDURE — 87798 DETECT AGENT NOS DNA AMP: CPT

## 2020-03-30 PROCEDURE — 86900 BLOOD TYPING SEROLOGIC ABO: CPT

## 2020-03-30 PROCEDURE — 83605 ASSAY OF LACTIC ACID: CPT

## 2020-03-30 PROCEDURE — 36430 TRANSFUSION BLD/BLD COMPNT: CPT

## 2020-03-30 PROCEDURE — 83880 ASSAY OF NATRIURETIC PEPTIDE: CPT

## 2020-03-30 PROCEDURE — 86901 BLOOD TYPING SEROLOGIC RH(D): CPT

## 2020-03-30 PROCEDURE — 71045 X-RAY EXAM CHEST 1 VIEW: CPT

## 2020-03-30 PROCEDURE — 85018 HEMOGLOBIN: CPT

## 2020-03-30 PROCEDURE — 94640 AIRWAY INHALATION TREATMENT: CPT

## 2020-03-30 PROCEDURE — 87086 URINE CULTURE/COLONY COUNT: CPT

## 2020-03-30 PROCEDURE — 86850 RBC ANTIBODY SCREEN: CPT

## 2020-03-30 PROCEDURE — 85027 COMPLETE CBC AUTOMATED: CPT

## 2020-03-30 PROCEDURE — 36415 COLL VENOUS BLD VENIPUNCTURE: CPT

## 2020-03-30 PROCEDURE — 83615 LACTATE (LD) (LDH) ENZYME: CPT

## 2020-03-30 PROCEDURE — 84484 ASSAY OF TROPONIN QUANT: CPT

## 2020-03-30 PROCEDURE — 99239 HOSP IP/OBS DSCHRG MGMT >30: CPT

## 2020-03-30 PROCEDURE — 82550 ASSAY OF CK (CPK): CPT

## 2020-03-30 PROCEDURE — 82728 ASSAY OF FERRITIN: CPT

## 2020-03-30 PROCEDURE — 85730 THROMBOPLASTIN TIME PARTIAL: CPT

## 2020-03-30 PROCEDURE — 84145 PROCALCITONIN (PCT): CPT

## 2020-03-30 PROCEDURE — 81001 URINALYSIS AUTO W/SCOPE: CPT

## 2020-03-30 PROCEDURE — 86923 COMPATIBILITY TEST ELECTRIC: CPT

## 2020-03-30 PROCEDURE — 85014 HEMATOCRIT: CPT

## 2020-03-30 PROCEDURE — 99232 SBSQ HOSP IP/OBS MODERATE 35: CPT

## 2020-03-30 PROCEDURE — 87581 M.PNEUMON DNA AMP PROBE: CPT

## 2020-03-30 PROCEDURE — 96361 HYDRATE IV INFUSION ADD-ON: CPT

## 2020-03-30 PROCEDURE — 85610 PROTHROMBIN TIME: CPT

## 2020-03-30 PROCEDURE — 87486 CHLMYD PNEUM DNA AMP PROBE: CPT

## 2020-03-30 RX ORDER — ACETAMINOPHEN 500 MG
2 TABLET ORAL
Qty: 0 | Refills: 0 | DISCHARGE
Start: 2020-03-30

## 2020-03-30 RX ORDER — RIVAROXABAN 15 MG-20MG
1 KIT ORAL
Qty: 0 | Refills: 0 | DISCHARGE

## 2020-03-30 RX ORDER — FUROSEMIDE 40 MG
40 TABLET ORAL ONCE
Refills: 0 | Status: COMPLETED | OUTPATIENT
Start: 2020-03-30 | End: 2020-03-30

## 2020-03-30 RX ADMIN — Medication 1: at 12:19

## 2020-03-30 RX ADMIN — CARVEDILOL PHOSPHATE 12.5 MILLIGRAM(S): 80 CAPSULE, EXTENDED RELEASE ORAL at 05:31

## 2020-03-30 RX ADMIN — PANTOPRAZOLE SODIUM 40 MILLIGRAM(S): 20 TABLET, DELAYED RELEASE ORAL at 05:31

## 2020-03-30 RX ADMIN — MONTELUKAST 10 MILLIGRAM(S): 4 TABLET, CHEWABLE ORAL at 11:52

## 2020-03-30 RX ADMIN — Medication 40 MILLIGRAM(S): at 09:31

## 2020-03-30 RX ADMIN — Medication 81 MILLIGRAM(S): at 11:52

## 2020-03-30 RX ADMIN — BUDESONIDE AND FORMOTEROL FUMARATE DIHYDRATE 2 PUFF(S): 160; 4.5 AEROSOL RESPIRATORY (INHALATION) at 05:35

## 2020-03-30 RX ADMIN — Medication 650 MILLIGRAM(S): at 01:15

## 2020-03-30 RX ADMIN — Medication 1: at 08:03

## 2020-03-30 RX ADMIN — Medication 325 MILLIGRAM(S): at 11:52

## 2020-03-30 RX ADMIN — SERTRALINE 75 MILLIGRAM(S): 25 TABLET, FILM COATED ORAL at 11:52

## 2020-03-30 NOTE — PROGRESS NOTE ADULT - REASON FOR ADMISSION
collapse, SOB, r/o COVID - unclear date of sx onset, CODE: DNR/DNI
collapse, SOB, r/o COVID - unclear date of sx onset, CODE: DNR/DNI

## 2020-03-30 NOTE — PROGRESS NOTE ADULT - SUBJECTIVE AND OBJECTIVE BOX
[facility] -- Raman Ceja, Deneen, Aryan, Sloane, Evan Hernandez  Office # [phone]      Follow Up:    CAD. PPM    Subjective/Observations:   Seen at bedside, + cough +shortness of breath + reproducible chest pain   no dizziness no palpitations     REVIEW OF SYSTEMS: All other review of systems is negative unless indicated above    PAST MEDICAL & SURGICAL HISTORY:  Coronary artery disease involving native coronary artery of native heart, angina presence unspecified  Persistent atrial fibrillation: wwas on eliquis, stopped due to gi bleed workup underway  Type 2 diabetes mellitus without complication, without long-term current use of insulin  Hyperlipemia  MI, old  Hypertension  Sepsis: sec to prostate bx  S/P coronary artery bypass graft x 3  Pacemaker  Presence of Watchman left atrial appendage closure device  H/O prostate biopsy  History of cholecystectomy  Artificial cardiac pacemaker  Stented coronary artery  H/O coronary angioplasty: 4 stents  S/P CABG x 3      MEDICATIONS  (STANDING):  aspirin  chewable 81 milliGRAM(s) Oral daily  atorvastatin 80 milliGRAM(s) Oral at bedtime  budesonide  80 MICROgram(s)/formoterol 4.5 MICROgram(s) Inhaler 2 Puff(s) Inhalation two times a day  carvedilol 12.5 milliGRAM(s) Oral every 12 hours  dextrose 5%. 1000 milliLiter(s) (50 mL/Hr) IV Continuous <Continuous>  dextrose 50% Injectable 12.5 Gram(s) IV Push once  dextrose 50% Injectable 25 Gram(s) IV Push once  dextrose 50% Injectable 25 Gram(s) IV Push once  ferrous    sulfate 325 milliGRAM(s) Oral daily  furosemide   Injectable 40 milliGRAM(s) IV Push once  insulin lispro (HumaLOG) corrective regimen sliding scale   SubCutaneous Before meals and at bedtime  montelukast 10 milliGRAM(s) Oral daily  pantoprazole    Tablet 40 milliGRAM(s) Oral two times a day  sertraline 75 milliGRAM(s) Oral daily  tamsulosin 0.8 milliGRAM(s) Oral at bedtime    MEDICATIONS  (PRN):  acetaminophen   Tablet .. 650 milliGRAM(s) Oral every 6 hours PRN Temp greater or equal to 38C (100.4F), Mild Pain (1 - 3)  ALBUTerol    90 MICROgram(s) HFA Inhaler 2 Puff(s) Inhalation every 6 hours PRN Shortness of Breath and/or Wheezing  dextrose 40% Gel 15 Gram(s) Oral once PRN Blood Glucose LESS THAN 70 milliGRAM(s)/deciliter  glucagon  Injectable 1 milliGRAM(s) IntraMuscular once PRN Glucose LESS THAN 70 milligrams/deciliter      Allergies    No Known Allergies    Intolerances        Vital Signs Last 24 Hrs  T(C): 36.8 (30 Mar 2020 05:00), Max: 36.9 (30 Mar 2020 02:39)  T(F): 98.2 (30 Mar 2020 05:00), Max: 98.5 (30 Mar 2020 02:39)  HR: 68 (30 Mar 2020 05:00) (68 - 75)  BP: 130/67 (30 Mar 2020 05:00) (129/70 - 151/85)  BP(mean): --  RR: 18 (30 Mar 2020 08:40) (17 - 18)  SpO2: 96% (30 Mar 2020 08:40) (96% - 100%)    I&O's Summary    29 Mar 2020 07:01  -  30 Mar 2020 07:00  --------------------------------------------------------  IN: 240 mL / OUT: 1000 mL / NET: -760 mL          PHYSICAL EXAM:  TELE: AV paced  Constitutional: NAD, awake and alert, well-developed  HEENT: Moist Mucous Membranes, Anicteric  Pulmonary: Non-labored, fine base rales bilaterally   Cardiovascular: Regular, S1 and S2 nl, No murmurs, rubs, gallops or clicks  Gastrointestinal: Bowel Sounds present, soft, nontender.   Lymph: No lymphadenopathy. No peripheral edema.  Skin: No visible rashes or ulcers.  Psych:  Mood & affect appropriate    LABS: All Labs Reviewed:                        9.5    8.37  )-----------( 152      ( 30 Mar 2020 01:10 )             28.9                         8.6    x     )-----------( x        ( 29 Mar 2020 14:30 )             26.6                         8.3    8.16  )-----------( 157      ( 29 Mar 2020 07:51 )             25.8     29 Mar 2020 07:51    141    |  108    |  23     ----------------------------<  116    4.4     |  28     |  1.40   28 Mar 2020 20:46    140    |  105    |  28     ----------------------------<  182    4.7     |  26     |  1.60     Ca    8.8        29 Mar 2020 07:51  Ca    9.2        28 Mar 2020 20:46    TPro  6.5    /  Alb  3.0    /  TBili  0.4    /  DBili  x      /  AST  69     /  ALT  40     /  AlkPhos  57     29 Mar 2020 07:51  TPro  7.5    /  Alb  3.4    /  TBili  0.5    /  DBili  x      /  AST  114    /  ALT  51     /  AlkPhos  66     28 Mar 2020 20:46    PT/INR - ( 28 Mar 2020 20:46 )   PT: 13.2 sec;   INR: 1.17 ratio         PTT - ( 29 Mar 2020 18:48 )  PTT:30.9 sec  CARDIAC MARKERS ( 29 Mar 2020 07:51 )  .100 ng/mL / x     / 76 U/L / x     / 2.5 ng/mL  CARDIAC MARKERS ( 29 Mar 2020 01:53 )  .152 ng/mL / x     / 61 U/L / x     / 2.4 ng/mL  CARDIAC MARKERS ( 28 Mar 2020 20:46 )  .031 ng/mL / x     / 40 U/L / x     / x             ECG:  < from: 12 Lead ECG (03.29.20 @ 04:26) >    Ventricular Rate 71 BPM    Atrial Rate 60 BPM    QRS Duration 194 ms    Q-T Interval 522 ms    QTC Calculation(Bezet) 567 ms    P Axis 87 degrees    R Axis -27 degrees    T Axis 168 degrees    Diagnosis Line AV dual-paced rhythm  Abnormal ECG  Whencompared with ECG of 20-FEB-2020 08:24,  premature ventricular complexes are no longer present  Vent. rate has decreased BY  12 BPM    < end of copied text >      Echo:  < from: TTE Echo Doppler w/o Cont (02.25.20 @ 14:29) >  OBSERVATIONS:  Technically difficult study  Mitral Valve: normal, mild MR.  Aortic Valve/Aorta: Calcified trileaflet aortic valve with normal opening. Trace AI. Dilated aortic root of 4.4 cm  Tricuspid Valve: normal with trace TR.  Pulmonic Valve: Trace PI  Left Atrium: Enlarged  Right Atrium: Not well-visualized  Left Ventricle: normal LV size with low-normal systolic function, estimated LVEF of 50-55%. LVH  Right Ventricle: Grossly normal size and systolic function. Device wire is notedwithin the right heart  Pericardium/Pleura: normal, no significant pericardial effusion.  Pulmonary/RV Pressure: estimated PA systolic pressure of 19 mmHg     Conclusion:   Technically difficult study  Concentric LVH with low-normal systolic function, estimated LVEF of 50-55%.   Grossly normal RV size and systolic function. Device wire seen within the right heart  Left atrial enlargement  Calcified trileaflet aortic valve, trace AI.   Dilated aortic root at 4.4 cm  Mild MR   Trace TR.    Estimated PA systolic pressure of 19 mmHg.   No significant pericardial effusion.        < end of copied text >      Radiology:  < from: Xray Chest 1 View-PORTABLE IMMEDIATE (03.28.20 @ 20:49) >  History: Shortness of breath, fever, cough    Technique: Single frontal portable view of the chest with comparison to  2/20/2020    Findings:    Left-sided pacemaker. Prior sternotomy. Heart is enlarged. Minimal pulmonary vascular congestion. No focal consolidation. Apices unremarkable. Degenerative changes of the visualized osseous structures.        Impression:    Minimal pulmonary vascular congestion. No focal consolidation    < end of copied text >

## 2020-03-30 NOTE — ADVANCED PRACTICE NURSE CONSULT - ASSESSMENT
Vital Signs Last 24 Hrs  T(C): 36.8 (30 Mar 2020 05:00), Max: 36.9 (30 Mar 2020 02:39)  T(F): 98.2 (30 Mar 2020 05:00), Max: 98.5 (30 Mar 2020 02:39)  HR: 70 (30 Mar 2020 09:13) (68 - 75)  BP: 142/72 (30 Mar 2020 09:13) (129/70 - 151/85)  BP(mean): --  RR: 18 (30 Mar 2020 08:40) (17 - 18)  SpO2: 96% (30 Mar 2020 08:40) (96% - 100%)    Hemoglobin A1C, Whole Blood: 6.8 % (03-29-20 @ 11:58)  Hemoglobin A1C, Whole Blood: 6.4 % (02-21-20 @ 08:53)   eGFR if Non African American: 45 mL/min/1.73M2 (03-29-20 @ 07:51)  eGFR if : 52 mL/min/1.73M2 (03-29-20 @ 07:51)  eGFR if Non African American: 38 mL/min/1.73M2 (03-28-20 @ 20:46)  eGFR if : 44 mL/min/1.73M2 (03-28-20 @ 20:46)      CAPILLARY BLOOD GLUCOSE      POCT Blood Glucose.: 153 mg/dL (30 Mar 2020 07:44)  POCT Blood Glucose.: 154 mg/dL (29 Mar 2020 23:26)  POCT Blood Glucose.: 123 mg/dL (29 Mar 2020 17:13)  POCT Blood Glucose.: 202 mg/dL (29 Mar 2020 11:48)      DIET: CC  %

## 2020-03-30 NOTE — ADVANCED PRACTICE NURSE CONSULT - REASON FOR CONSULT
88y    Male    Patient is a 88y old  Male who presents with a chief complaint of collapse, SOB, r/o COVID - unclear date of sx onset, CODE: DNR/DNI (30 Mar 2020 08:50)    SW son: aware COVID lab result will not be ready until late tonight or tomorrow. Will speak with Dr. Mcnamara to review DSC plan       HPI:  89yo M, with PMH/o CAD s/p CABG x3 (30 yrs ago), s/p 4 stents placed (10 yrs ago), s/p PPM ([employer]), HTN, HLD, atrial fibrillation (on Eliquis) s/p Watchman device placement (2017), and non-insulin dependent T2DM, c/o weakness that caused him to fall to his knees today. Per patient, he felt lightheaded and lost his balance upon standing when he was getting dressed for bed. He denies head injury and states he was unable to get up on his own. Upon EMS arrival, pt was reportedly unresponsive, having received chest compressions from a bystander despite not being in arrest, and noted to have SpO2 ~70s on RA which improved with supplemental O2. Of note, patient was recently admitted to [facility] (2/20-2/26) for PNA, after which he continued to feel weak. He describes being chronically SOB requiring 2L NC O2 at night and that his current respiratory status is unchanged from baseline. Denies recent sick contacts or recent travel. Additionally admits to 1x nonbloody diarrhea at time of fall. Currently endorsing non-radiating "terrible" central chest pain, worsened with inspiration, that began when placed on the stretcher by EMS. Otherwise denies fever, chills, headache, vision changes, numbness/tingling, current palpitations, abd pain, N/V, dysuria, hematuria, skin changes, or new myalgia/arthralgia. Patient expresses wishes to be DNR/DNI, MOLST completed.    In the ED  VS: T 96.8 HR 92 /59 RR 26 SpO2 95% on 3L NC ->97% on 2L NC  Significant labs include lactate 4.3, H/H 9.5/30.2, lymphocytes 0.88, NLR 6.8, BUN/Cr 28/1.6, glucose 182, .  CXR: based on my preliminary read with comparison to CXR on 2/20, there appears to be a new LLL opacification with interval improvement of R-sided infiltrate  CT head: No gross acute intracranial hemorrhage, mass effect, or acute osseous fracture. 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  CT Cspine: No acute cervical spine fracture or evidence of traumatic malalignment. Cervical spondylosis.  EKG: AV dual-paced w/ occasional PVC    Patient received 2L NS. (28 Mar 2020 21:58)      PAST MEDICAL & SURGICAL HISTORY:  Coronary artery disease involving native coronary artery of native heart, angina presence unspecified  Persistent atrial fibrillation: wwas on eliquis, stopped due to gi bleed workup underway  Type 2 diabetes mellitus without complication, without long-term current use of insulin  Hyperlipemia  MI, old  Hypertension  Sepsis: sec to prostate bx  S/P coronary artery bypass graft x 3  Pacemaker  Presence of Watchman left atrial appendage closure device  H/O prostate biopsy  History of cholecystectomy  Artificial cardiac pacemaker  Stented coronary artery  H/O coronary angioplasty: 4 stents  S/P CABG x 3      MEDICATIONS  (STANDING):  aspirin  chewable 81 milliGRAM(s) Oral daily  atorvastatin 80 milliGRAM(s) Oral at bedtime  budesonide  80 MICROgram(s)/formoterol 4.5 MICROgram(s) Inhaler 2 Puff(s) Inhalation two times a day  carvedilol 12.5 milliGRAM(s) Oral every 12 hours  dextrose 5%. 1000 milliLiter(s) (50 mL/Hr) IV Continuous <Continuous>  dextrose 50% Injectable 12.5 Gram(s) IV Push once  dextrose 50% Injectable 25 Gram(s) IV Push once  dextrose 50% Injectable 25 Gram(s) IV Push once  ferrous    sulfate 325 milliGRAM(s) Oral daily  insulin lispro (HumaLOG) corrective regimen sliding scale   SubCutaneous Before meals and at bedtime  montelukast 10 milliGRAM(s) Oral daily  pantoprazole    Tablet 40 milliGRAM(s) Oral two times a day  sertraline 75 milliGRAM(s) Oral daily  tamsulosin 0.8 milliGRAM(s) Oral at bedtime

## 2020-03-30 NOTE — DISCHARGE NOTE NURSING/CASE MANAGEMENT/SOCIAL WORK - PATIENT PORTAL LINK FT
You can access the FollowMyHealth Patient Portal offered by [facility] by registering at the following website: [email or web address]. By joining [employer]’s FollowMyHealth portal, you will also be able to view your health information using other applications (apps) compatible with our system.

## 2020-03-30 NOTE — PROGRESS NOTE ADULT - ASSESSMENT
Assessment/Plan:  87 y/o M with CAD, remote CABG and stents, PPM, HTN, HLD, Afib (on AC) s/p Watchman device, DM2, COPD on nocturnal NC presented s/p fall.  Patient states, he felt weak, dizzy, and lightheadedness last night and when he was changing clothes, he lost his balance and fell.  Admitted to his private cardio that he remembers hitting his chest on the chair in the process of falling.  He has a 24-aide who was not able to get him up.  Next thing he remembers, the fire department was around him.  Unable to recall if chest compression waas done.  However, per chart, bystanders performed chest compression on him because he was found to be unresponsive, though, was not in cardiac arrest.  He was found to be hypoxic at 70's on RA with improvement with O2.  Denies fever, chills, cough, diarrhea, change in appetite, or weight loss.  Denies any sick contact.  Admits to have chest pain on deep inspiration, otherwise, no complaints.  On NC but denies any SOB, LEE, or orthopnea.  Of note, patient was recently discharged for Pna.    In the ED, he presneted with elevated pro-BNP (4219), D-Dimer (1890), Lactate (4.3), creatinine (1.6), CxR with mild pulmonary congestion.  Troponin leak noted but CPK's remained flat.  EKG showed AVpaced    CAD with remote CABG and stents  - his CP is pleuritic and muscular in nature  - Though, his troponins were very mildly elevated, his CPK's remained flat.  No need to trend.  This does not represent a arin ACS  - His EKG showed 100% AV paced.  - Continue ASA, BB, and statin  - recent TTE on file (2/2020) showing EF 50-55%, LVH, normal RVSF, dilalted aortic root at 4.4 cm but no significant valvular disease.  No need to repeat      Afib (Chronic) s/p PPM  - He is AV paced on tele  - Continue Coreg  - Monitor electrolytes, replete to keep K>4 and Mag>2  - He is s/p Watchman device for Hx of GIB  -Resume Xarelto if occult negative- was held on admission for drop in hgb now improving       s/p Fall  - Patient's fall was likely from his weakness/dizziness  - Unsure if he truly was at one point unresponsive  - PPM interrogated at bedside, transmitted to [employer].  Awaiting for remote reading from rep  - Fall precaution  - Sepsis workup  - Follow up COVID PCR    HTN  - Continue Coreg    vascular congestion  Chest xray with pvc  bibasilar rales on exam  Lasix 40X 1 ordered   Strict intake and output     COPD  - On nocturnal NC.  Has chronic LEE  - COVID testing in progress  - RVP panel negative   DM2  - Per Primary      HLD  - Continue statin  - Can reduce to 40 mg     GILBERT on CKD  - Continue to trend renal indices  - Avoid nephrotoxics  Aziza Elizabeth FNP-C  Cardiology NP  [facility] [id number] [phone]

## 2020-03-30 NOTE — ADVANCED PRACTICE NURSE CONSULT - RECOMMEDATIONS
89 yo weakness/fall  Case discussed with Dr. Kate Wilde test pending  Son/PCP requesting patient to be discharge  Call into Dr Mcnamara.

## 2020-04-02 LAB
CULTURE RESULTS: SIGNIFICANT CHANGE UP
CULTURE RESULTS: SIGNIFICANT CHANGE UP
SPECIMEN SOURCE: SIGNIFICANT CHANGE UP
SPECIMEN SOURCE: SIGNIFICANT CHANGE UP

## 2020-11-11 NOTE — PROGRESS NOTE ADULT - PROBLEM SELECTOR PLAN 1
in bed, seen and examined, vs and meds reviewed, labs reviewed,   changed to PO ABX this am   will dc Steroids this am, cont NEBS and Hypertonic Saline nebs - COPD management  pulm congestion - multifactorial -   HF and Pulm Edema and poss PNA and COPD  agree with diuresis - Cr normalized - I and O - renal and cardio follow up  on emp ABX for poss PNA - ct chest reviewed -   out of bed  o2 support  cough rx regimen  tele monitoring  CT Chest suggestive of Pulm edema / in conjunction with Cardiac Hx and ProBNP -.
pulm congestion - multifactorial  HF and Pulm Edema and poss PNA and COPD  nebs and steroids for COPD  will benefit from Diuresis - Renal and Cardio assessment noted - monitor Cr and renal function  on emp ABX for poss PNA - ct chest reviewed  out of bed  o2 support  cough rx regimen  tele monitoring  CT Chest suggestive of Pulm edema / in conjunction with Cardiac Hx and ProBNP -   will follow
seen and examined  on room air  vs and meds reviewed  labs reviewed  on PO ABX now - Ceftin -   remains on LASIX  remains on NEBS and Hypertonic Saline Nebs -   in bed, seen and examined, vs and meds reviewed, labs reviewed,   completed short course of systemic steroids  pulm congestion - multifactorial -   HF and Pulm Edema and poss PNA and COPD  agree with diuresis - Cr normalized (CKD) - I and O - renal and cardio follow up  on emp ABX for poss PNA - ct chest reviewed -   cough rx regimen  CT Chest suggestive of Pulm edema / in conjunction with Cardiac Hx and ProBNP -.
seen and examined  vs and meds reviewed  labs reviewed  on o2 support NC  pulm congestion - multifactorial -   HF and Pulm Edema and poss PNA and COPD  nebs and steroids for COPD - cont curr dose and frequency  agree with diuresis - Cr normalized - I and O - renal and cardio follow up  on emp ABX for poss PNA - ct chest reviewed - on Rocephin IV  out of bed  o2 support  cough rx regimen  tele monitoring  CT Chest suggestive of Pulm edema / in conjunction with Cardiac Hx and ProBNP -.
seen and examined - VS and Meds reviewed - LABS reviewed -   pulm congestion - multifactorial - wheezing noted this am -   HF and Pulm Edema and poss PNA and COPD  nebs and steroids for COPD - cont curr dose and frequency  agree with diuresis - Cr normalized - I and O - renal and cardio follow up  on emp ABX for poss PNA - ct chest reviewed - on Rocephin IV  out of bed  o2 support  cough rx regimen  tele monitoring  CT Chest suggestive of Pulm edema / in conjunction with Cardiac Hx and ProBNP -.
seen and examined, vs and meds reviewed, on o2 support - alert and verbal  cardio and renal follow up noted this am - got additional LASIX -   pulm congestion - multifactorial - wheezing noted this am -   HF and Pulm Edema and poss PNA and COPD  nebs and steroids for COPD - cont curr dose and frequency  agree with diuresis - Cr normalized - I and O - renal and cardio follow up  on emp ABX for poss PNA - ct chest reviewed - on Rocephin IV  out of bed  o2 support  cough rx regimen  tele monitoring  CT Chest suggestive of Pulm edema / in conjunction with Cardiac Hx and ProBNP -
CT shows likely pneumonia  - Patient on ceftriaxone and azithromycin  - Pulmonology, Dr. Winters, consulted. recs appreciated.  - Continue standing duonebs Q6H  - Continue prednisone 30 daily  - F/U PT consult
CT shows likely pneumonia  - Patient on ceftriaxone and azithromycin- completed zithro course  - Pulmonology, Dr. Winters, consulted. recs appreciated.  - Continue standing duonebs Q6H, added hypertonic saline  - Continue prednisone 30 daily, given extra dose of IV solumedrol today  - F/U PT consult
CT shows likely pneumonia  - Patient on ceftriaxone and azithromycin- completed zithro course  - Pulmonology, Dr. Winters, consulted. recs appreciated.  - Continue standing duonebs Q6H, added hypertonic saline  - Continue prednisone 30 daily, given extra dose of IV solumedrol today  - F/U PT consult
CT shows likely pneumonia  - Pulmonology, Dr. Winters, consulted. recs appreciated.  - Patient transitioned to PO medications in preparation for discharge. Now on Ceftin 500 BID  - Continue standing duonebs Q6H,  hypertonic saline  - PT consult - home w/ assist; aide/family assist as needed  - Steroids discontinued
CT shows likely pneumonia  - Pulmonology, Dr. Winters, consulted. recs appreciated.  - Patient transitioned to PO medications in preparation for discharge. Now on Ceftin 500 BID  - Continue standing duonebs Q6H, added hypertonic saline  - Continue prednisone 20 daily  - PT consult - home w/ assist; aide/family assist as needed  - Plan for D/C tomorrow
Crescentic Advancement Flap Text: The defect edges were debeveled with a #15 scalpel blade.  Given the location of the defect and the proximity to free margins a crescentic advancement flap was deemed most appropriate.  Using a sterile surgical marker, the appropriate advancement flap was drawn incorporating the defect and placing the expected incisions within the relaxed skin tension lines where possible.    The area thus outlined was incised deep to adipose tissue with a #15 scalpel blade.  The skin margins were undermined to an appropriate distance in all directions utilizing iris scissors.

## 2020-11-11 NOTE — PATIENT PROFILE ADULT - NSPROHMSYMPCOND_GEN_A_NUR
[General Appearance - Well Developed] : well developed [General Appearance - Well Nourished] : well nourished [Abdomen Soft] : soft [Abdomen Tenderness] : non-tender [Prostate Tenderness] : the prostate was not tender [No Prostate Nodules] : no prostate nodules [Prostate Size ___ gm] : prostate size [unfilled] gm [Skin Color & Pigmentation] : normal skin color and pigmentation [Edema] : no peripheral edema [] : no respiratory distress [Affect] : the affect was normal [Normal Station and Gait] : the gait and station were normal for the patient's age [No Focal Deficits] : no focal deficits [No Palpable Adenopathy] : no palpable adenopathy cardiovascular/diabetes

## 2020-12-12 ENCOUNTER — EMERGENCY (EMERGENCY)
Facility: HOSPITAL | Age: 85
LOS: 1 days | Discharge: HOME | End: 2020-12-12
Attending: EMERGENCY MEDICINE | Admitting: EMERGENCY MEDICINE
Payer: MEDICARE

## 2020-12-12 VITALS
OXYGEN SATURATION: 97 % | SYSTOLIC BLOOD PRESSURE: 158 MMHG | HEART RATE: 88 BPM | RESPIRATION RATE: 16 BRPM | DIASTOLIC BLOOD PRESSURE: 74 MMHG

## 2020-12-12 VITALS
SYSTOLIC BLOOD PRESSURE: 167 MMHG | DIASTOLIC BLOOD PRESSURE: 78 MMHG | TEMPERATURE: 97 F | RESPIRATION RATE: 16 BRPM | HEART RATE: 90 BPM | WEIGHT: 179.9 LBS

## 2020-12-12 DIAGNOSIS — Z95.0 PRESENCE OF CARDIAC PACEMAKER: Chronic | ICD-10-CM

## 2020-12-12 DIAGNOSIS — Z98.890 OTHER SPECIFIED POSTPROCEDURAL STATES: Chronic | ICD-10-CM

## 2020-12-12 DIAGNOSIS — Z90.49 ACQUIRED ABSENCE OF OTHER SPECIFIED PARTS OF DIGESTIVE TRACT: Chronic | ICD-10-CM

## 2020-12-12 DIAGNOSIS — Z95.818 PRESENCE OF OTHER CARDIAC IMPLANTS AND GRAFTS: Chronic | ICD-10-CM

## 2020-12-12 DIAGNOSIS — Z95.1 PRESENCE OF AORTOCORONARY BYPASS GRAFT: Chronic | ICD-10-CM

## 2020-12-12 DIAGNOSIS — Z95.5 PRESENCE OF CORONARY ANGIOPLASTY IMPLANT AND GRAFT: Chronic | ICD-10-CM

## 2020-12-12 DIAGNOSIS — Z98.61 CORONARY ANGIOPLASTY STATUS: Chronic | ICD-10-CM

## 2020-12-12 PROCEDURE — 90715 TDAP VACCINE 7 YRS/> IM: CPT

## 2020-12-12 PROCEDURE — 72125 CT NECK SPINE W/O DYE: CPT

## 2020-12-12 PROCEDURE — 90471 IMMUNIZATION ADMIN: CPT

## 2020-12-12 PROCEDURE — 99284 EMERGENCY DEPT VISIT MOD MDM: CPT | Mod: 25

## 2020-12-12 PROCEDURE — 72125 CT NECK SPINE W/O DYE: CPT | Mod: 26

## 2020-12-12 PROCEDURE — 70450 CT HEAD/BRAIN W/O DYE: CPT | Mod: 26

## 2020-12-12 PROCEDURE — 70450 CT HEAD/BRAIN W/O DYE: CPT

## 2020-12-12 PROCEDURE — 12001 RPR S/N/AX/GEN/TRNK 2.5CM/<: CPT

## 2020-12-12 RX ORDER — TETANUS TOXOID, REDUCED DIPHTHERIA TOXOID AND ACELLULAR PERTUSSIS VACCINE, ADSORBED 5; 2.5; 8; 8; 2.5 [IU]/.5ML; [IU]/.5ML; UG/.5ML; UG/.5ML; UG/.5ML
0.5 SUSPENSION INTRAMUSCULAR ONCE
Refills: 0 | Status: COMPLETED | OUTPATIENT
Start: 2020-12-12 | End: 2020-12-12

## 2020-12-12 RX ADMIN — TETANUS TOXOID, REDUCED DIPHTHERIA TOXOID AND ACELLULAR PERTUSSIS VACCINE, ADSORBED 0.5 MILLILITER(S): 5; 2.5; 8; 8; 2.5 SUSPENSION INTRAMUSCULAR at 11:07

## 2020-12-12 NOTE — ED PROVIDER NOTE - ATTENDING CONTRIBUTION TO CARE
I have personally performed a face to face diagnostic evaluation on this patient.  I have reviewed the PA note and agree with the history, exam, and plan of care, except as noted.  History and Exam by me shows patient presents to ER by ambulance with report of left sided head laceration s/p trip and fall. Patient states around 9am he was leaning over to grab something from the floor and fell hitting edge of furniture onto left side of pariatal area causing 1cm laceration, bleeding controlled, patient alert and oriented, states he feels well, heart and lungs clear, abdomen soft, able to move all extremities, f/u ct head, update tetnus, clean wound, staples. I have personally performed a face to face diagnostic evaluation on this patient.  I have reviewed the PA note and agree with the history, exam, and plan of care, except as noted.  History and Exam by me shows patient presents to ER by ambulance with report of left sided head laceration s/p trip and fall. Patient states around 9am he was leaning over to grab something from the floor and fell hitting edge of furniture onto left side of pariatal area causing 1cm laceration, bleeding controlled, patient alert and oriented, states he feels well, heart and lungs clear, abdomen soft, able to move all extremities, f/u ct head, update tetanus, clean wound, staples.

## 2020-12-12 NOTE — ED PROVIDER NOTE - CLINICAL SUMMARY MEDICAL DECISION MAKING FREE TEXT BOX
presenting due to fall. Pt reports losing balance. reports head injury with lac. plan includes ct head r/o CVA, Ct cervical r/o fx, lac repair, adacel, re-assess

## 2020-12-12 NOTE — ED PROVIDER NOTE - PATIENT PORTAL LINK FT
You can access the FollowMyHealth Patient Portal offered by [facility] by registering at the following website: [email or web address]. By joining [employer]’s FollowMyHealth portal, you will also be able to view your health information using other applications (apps) compatible with our system.

## 2020-12-12 NOTE — ED PROVIDER NOTE - NSFOLLOWUPINSTRUCTIONS_ED_ALL_ED_FT
Return to ER or follow up with your primary care doctor in 8 to 10 days for staple removal.      Staples     When staples are used to close a wound, the edges of your skin on both sides of the wound are brought close together. A staple is placed across the wound, and an instrument secures the staple edges together.    Staples are often used to close surgical incisions. They are faster to use than sutures, and they cause less skin reaction. Staples need to be removed using a tool that bends the staples away from your skin.      Follow these instructions at home:    Medicines     •Take over-the-counter and prescription medicines only as told by your health care provider.      •If you were prescribed an antibiotic medicine, take it as told by your health care provider. Do not stop taking the antibiotic even if you start to feel better.        Wound care      •Follow instructions from your health care provider about how to take care of your wound and dressing.      •Wash your hands with soap and water before and after touching your wound or dressing. If soap and water are not available, use hand .    • Do not try to remove your wound closures unless your health care provider tells you to do that. You may need a follow-up visit with your health care provider to remove your closures.  •Wound closures may stay in place for 2 weeks or longer.      •Absorbable sutures may dissolve after a few days or weeks.      •If adhesive strip edges start to loosen and curl up, you may trim the loose edges.        • Do not pick at your wound. Picking can cause an infection.      •Apply ointments or creams only as told by your health care provider.    •Check your wound every day for signs of infection. Check for:  •Redness, swelling, or pain.      •Fluid or blood.      •Warmth.      •Pus or a bad smell.        General instructions     • Do not take baths, swim, or use a hot tub until your health care provider approves. Ask your health care provider if you may take showers. You may only be allowed to take sponge baths.      • Do not soak your wound in water.      •Keep all follow-up visits as told by your health care provider. This is important.        Contact a health care provider if you:    •Have a fever or chills.      •Have redness, swelling, or pain around your wound.      •Have fluid or blood coming from your wound.      •Notice that your wound feels warm to the touch.      •Notice pus or a bad smell coming from your wound.      •Notice that the edges of your wound start to separate after your sutures come out.      •Notice that your wound becomes thick, raised, and darker in color after your sutures come out (scarring).        Summary    •The type of wound closure that your health care provider will use depends on the location, size, and depth of your wound. Options to close wounds include stitches (sutures), staples, special types of glue (skin adhesives), and adhesive strips.      •Your health care provider will use a wound closure method that helps you heal quickly and reduces the chances of infection or scarring.      • Do not soak your wound in water. Do not take baths, shower, swim, or use a hot tub until your health care provider approves.      This information is not intended to replace advice given to you by your health care provider. Make sure you discuss any questions you have with your health care provider.      Document Revised: 11/30/2018 Document Reviewed: 10/25/2018    Elsevier Patient Education © 2020 [employer]

## 2020-12-12 NOTE — ED PROVIDER NOTE - PHYSICAL EXAMINATION
Constitutional: Awake, Alert, non-toxic. NAD. Well appearing, well nourished.   HEAD: Normocephalic, (+) left sided parietal scalp 1 cm laceration, no step off    EYES: PERRL, EOM intact, conjunctiva and sclera are clear bilaterally. No raccoon eyes.   ENT: No chambers sign. No rhinorrhea, normal pharynx, patent, no tonsillar exudate or enlargement, mucous membranes pink/moist, no erythema, no drooling or stridor.   NECK: Supple, non-tender  BACK: No midline or paraspinal TTP of cervical/thoracic/lumbar spine, FROM. No ecchymosis or hematomas.   CARDIOVASCULAR: Normal S1, S2; regular rate and rhythm.  RESPIRATORY: Normal respiratory effort; breath sounds CTAB, no wheezes, rhonchi, or rales. Speaking in full sentences. No accessory muscle use.   ABDOMEN: Soft; non-tender, non-distended.   EXTREMITIES: Full passive and active ROM in all extremities; non-tender to palpation; distal pulses palpable and symmetric, no edema, no crepitus or step off  SKIN: Warm, dry; good skin turgor, no apparent lesions or rashes, no ecchymosis, brisk capillary refill.  NEURO: A&O x3. Sensory and motor functions are grossly intact. Speech is normal. Appearance and judgement seem appropriate for gender and age. No neurological deficits. Neurovascular sensation intact motor function 5/5 of upper and lower extremities, CN II-XII grossly intact, no ataxia, absent pronator drift, intact cerebellar function. Speech clear, without articulation or word-finding difficulties. Eyes- PERRL bilaterally. EOMs in tact. No nystagmus. No facial droop.

## 2020-12-12 NOTE — ED ADULT NURSE NOTE - OBJECTIVE STATEMENT
Patient states he was leaning over to turn off his oxygen tank (he uses it at night) and lost his balance, sustaining a laceration to the left side of his head, no LOC.

## 2020-12-12 NOTE — ED PROVIDER NOTE - OBJECTIVE STATEMENT
88 y/o male with PMHx HTN presents today due to fall. pt reports he was getting out of bed and turning off his oxygen in which he lost his balance. Pt reports left sided head injury with laceration. pt reports feeling otherwise well. pt reports he is not UTD with tetanus. pt denies headache, prodromal symptoms, neck pain, chest pain, SOB, abd pain, vomiting, numbness/weakness, fever, or any other complaints.

## 2020-12-12 NOTE — ED ADULT NURSE REASSESSMENT NOTE - NS ED NURSE REASSESS COMMENT FT1
Patient's wound was stapled, TDAP administered, patient CT resulted  Patient will be discharged.  Called sonHawk at [phone] and he is arranging transport home, he will call back.  Patient informed.

## 2020-12-14 RX ORDER — TAMSULOSIN HYDROCHLORIDE 0.4 MG/1
0 CAPSULE ORAL
Qty: 0 | Refills: 0 | DISCHARGE

## 2020-12-14 RX ORDER — MONTELUKAST 4 MG/1
0 TABLET, CHEWABLE ORAL
Qty: 0 | Refills: 0 | DISCHARGE

## 2020-12-14 RX ORDER — METFORMIN HYDROCHLORIDE 850 MG/1
0 TABLET ORAL
Qty: 0 | Refills: 0 | DISCHARGE

## 2020-12-14 RX ORDER — FUROSEMIDE 40 MG
0 TABLET ORAL
Qty: 0 | Refills: 0 | DISCHARGE

## 2020-12-14 RX ORDER — PANTOPRAZOLE SODIUM 20 MG/1
0 TABLET, DELAYED RELEASE ORAL
Qty: 0 | Refills: 0 | DISCHARGE

## 2020-12-14 RX ORDER — ASPIRIN/CALCIUM CARB/MAGNESIUM 324 MG
0 TABLET ORAL
Qty: 0 | Refills: 0 | DISCHARGE

## 2020-12-14 RX ORDER — SERTRALINE 25 MG/1
0 TABLET, FILM COATED ORAL
Qty: 0 | Refills: 0 | DISCHARGE

## 2020-12-14 RX ORDER — NITROGLYCERIN 6.5 MG
0 CAPSULE, EXTENDED RELEASE ORAL
Qty: 0 | Refills: 0 | DISCHARGE

## 2020-12-22 ENCOUNTER — EMERGENCY (EMERGENCY)
Facility: HOSPITAL | Age: 85
LOS: 1 days | End: 2020-12-22
Attending: EMERGENCY MEDICINE
Payer: MEDICARE

## 2020-12-22 VITALS
HEIGHT: 69 IN | HEART RATE: 78 BPM | DIASTOLIC BLOOD PRESSURE: 56 MMHG | WEIGHT: 186.07 LBS | TEMPERATURE: 98 F | OXYGEN SATURATION: 91 % | RESPIRATION RATE: 18 BRPM | SYSTOLIC BLOOD PRESSURE: 104 MMHG

## 2020-12-22 VITALS
OXYGEN SATURATION: 97 % | HEART RATE: 76 BPM | SYSTOLIC BLOOD PRESSURE: 110 MMHG | DIASTOLIC BLOOD PRESSURE: 60 MMHG | RESPIRATION RATE: 15 BRPM | TEMPERATURE: 98 F

## 2020-12-22 DIAGNOSIS — Z95.818 PRESENCE OF OTHER CARDIAC IMPLANTS AND GRAFTS: Chronic | ICD-10-CM

## 2020-12-22 DIAGNOSIS — Z95.5 PRESENCE OF CORONARY ANGIOPLASTY IMPLANT AND GRAFT: Chronic | ICD-10-CM

## 2020-12-22 DIAGNOSIS — Z98.61 CORONARY ANGIOPLASTY STATUS: Chronic | ICD-10-CM

## 2020-12-22 DIAGNOSIS — Z95.1 PRESENCE OF AORTOCORONARY BYPASS GRAFT: Chronic | ICD-10-CM

## 2020-12-22 DIAGNOSIS — Z95.0 PRESENCE OF CARDIAC PACEMAKER: Chronic | ICD-10-CM

## 2020-12-22 DIAGNOSIS — Z90.49 ACQUIRED ABSENCE OF OTHER SPECIFIED PARTS OF DIGESTIVE TRACT: Chronic | ICD-10-CM

## 2020-12-22 DIAGNOSIS — Z98.890 OTHER SPECIFIED POSTPROCEDURAL STATES: Chronic | ICD-10-CM

## 2020-12-22 PROBLEM — I10 ESSENTIAL (PRIMARY) HYPERTENSION: Chronic | Status: ACTIVE | Noted: 2020-12-12

## 2020-12-22 PROCEDURE — L9995: CPT

## 2020-12-22 PROCEDURE — G0463: CPT

## 2020-12-22 NOTE — ED PROVIDER NOTE - ATTENDING CONTRIBUTION TO CARE
67 yo M presents for staple removal 2 staples from hand  vss  wound clean dry intact.  removed  DC with pmd f/u  Based on the H&P, I do not suspect any life- / limb- threatening pathology that requires further intervention at this time. 67 yo M presents for staple removal 2 staples from scalp  vss  wound clean dry intact.  removed  DC with pmd f/u  Based on the H&P, I do not suspect any life- / limb- threatening pathology that requires further intervention at this time.

## 2020-12-22 NOTE — ED ADULT NURSE NOTE - OBJECTIVE STATEMENT
89 male to ED to have staples re moved fro his head post fall x 10 days ago.  V/S WNL  PERRL   color good  no complaints  affected area clean and dry

## 2020-12-22 NOTE — ED PROVIDER NOTE - NSFOLLOWUPCLINICS_GEN_ALL_ED_FT
[facility] - Primary Care  Primary Care  [address] [place] [place] [address]  Phone: [phone]  Fax:   Follow Up Time: 1-3 Days

## 2020-12-22 NOTE — ED PROVIDER NOTE - PHYSICAL EXAMINATION
Constitutional: Awake, Alert, non-toxic. NAD. Well appearing, well nourished.   HEAD: Normocephalic, (+) left sided parietal 1 cm healing laceration without redness or discharge.   EYES: EOM intact, conjunctiva and sclera are clear bilaterally.   ENT: No rhinorrhea, patent, mucous membranes pink/moist, no drooling or stridor.   NECK: Supple, non-tender  CARDIOVASCULAR: Normal S1, S2; regular rate and rhythm.  RESPIRATORY: Normal respiratory effort; breath sounds CTAB, no wheezes, rhonchi, or rales. Speaking in full sentences. No accessory muscle use.   ABDOMEN: Soft; non-tender, non-distended.   EXTREMITIES: Full passive and active ROM in all extremities; non-tender to palpation; distal pulses palpable and symmetric  SKIN: Warm, dry; good skin turgor, no apparent lesions or rashes, no ecchymosis, brisk capillary refill.  NEURO: A&O x3. Sensory and motor functions are grossly intact. Speech is normal. Appearance and judgement seem appropriate for gender and age.

## 2020-12-22 NOTE — ED PROVIDER NOTE - OBJECTIVE STATEMENT
89 y/o male with PMHx HTn and HLD presents today for staple removal. pt reports he fell and hit his head 10 days ago in which he sustained laceration and had staple placed. pt reports feeling well overall. Pt denies headache, vomiting, numbness/weakness, fever, redness, discharge, or any other complaints.

## 2020-12-22 NOTE — ED ADULT NURSE NOTE - PMH
Coronary artery disease involving native coronary artery of native heart, angina presence unspecified    HTN (hypertension)    Hyperlipemia    Hypertension    MI, old    Pacemaker    Persistent atrial fibrillation  wwas on eliquis, stopped due to gi bleed workup underway  S/P coronary artery bypass graft x 3    Sepsis  sec to prostate bx  Type 2 diabetes mellitus without complication, without long-term current use of insulin

## 2020-12-22 NOTE — ED PROVIDER NOTE - PSH
Artificial cardiac pacemaker    H/O coronary angioplasty  4 stents  H/O prostate biopsy    History of cholecystectomy    No significant past surgical history    Presence of Watchman left atrial appendage closure device    S/P CABG x 3    Stented coronary artery

## 2020-12-22 NOTE — ED PROVIDER NOTE - CLINICAL SUMMARY MEDICAL DECISION MAKING FREE TEXT BOX
presents today for staple removal. pt reports he fell and hit his head 10 days ago in which he sustained laceration and had staple placed. pt reports feeling well overall.  plan includes staple removal and dc

## 2020-12-22 NOTE — ED ADULT NURSE NOTE - NSIMPLEMENTINTERV_GEN_ALL_ED
Implemented All Universal Safety Interventions:  [place] to call system. Call bell, personal items and telephone within reach. Instruct patient to call for assistance. Room bathroom lighting operational. Non-slip footwear when patient is off stretcher. Physically safe environment: no spills, clutter or unnecessary equipment. Stretcher in lowest position, wheels locked, appropriate side rails in place.

## 2020-12-22 NOTE — ED PROVIDER NOTE - PATIENT PORTAL LINK FT
You can access the FollowMyHealth Patient Portal offered by [facility] by registering at the following website: [email or web address]. By joining [employer]’s FollowMyHealth portal, you will also be able to view your health information using other applications (apps) compatible with our system.

## 2020-12-31 NOTE — ED PROVIDER NOTE - NSTIMEPROVIDERCAREINITIATE_GEN_ER
I reviewed the H&P, I examined the patient, and there are no changes in the patient's condition.  
28-Mar-2020 19:55

## 2021-02-18 NOTE — PATIENT PROFILE ADULT - NSPROHMDIABETBLDGLCUSUALFT_GEN_A_NUR
Dermatology Rooming Note    Laurie Morales's goals for this visit include:   Chief Complaint   Patient presents with     Derm Problem     Acne vulgaris - Laurie states she has been having breakout along her jawline     Jeanne Degroot, CMA    >150

## 2021-12-07 NOTE — ED PROVIDER NOTE - NSCAREINITIATED _GEN_ER
Detail Level: Zone Plan: Apply Sunscreen 30 SPF or greater, ideally broad spectrum with zinc or titanium as the active ingredient, daily to sun exposed areas. Recommended ELTA MD UV Clear as a great example of this. Can be purchased in clinic or on [employer]. Moody Maldonado(Attending)

## 2022-04-17 ENCOUNTER — EMERGENCY (EMERGENCY)
Facility: HOSPITAL | Age: 87
LOS: 1 days | Discharge: HOME | End: 2022-04-17
Attending: EMERGENCY MEDICINE | Admitting: EMERGENCY MEDICINE
Payer: MEDICARE

## 2022-04-17 VITALS
HEIGHT: 72 IN | WEIGHT: 194.01 LBS | HEART RATE: 73 BPM | OXYGEN SATURATION: 91 % | RESPIRATION RATE: 18 BRPM | DIASTOLIC BLOOD PRESSURE: 66 MMHG | TEMPERATURE: 98 F | SYSTOLIC BLOOD PRESSURE: 135 MMHG

## 2022-04-17 VITALS
OXYGEN SATURATION: 95 % | DIASTOLIC BLOOD PRESSURE: 63 MMHG | SYSTOLIC BLOOD PRESSURE: 140 MMHG | TEMPERATURE: 98 F | RESPIRATION RATE: 18 BRPM | HEART RATE: 75 BPM

## 2022-04-17 DIAGNOSIS — Z95.5 PRESENCE OF CORONARY ANGIOPLASTY IMPLANT AND GRAFT: Chronic | ICD-10-CM

## 2022-04-17 DIAGNOSIS — Z98.890 OTHER SPECIFIED POSTPROCEDURAL STATES: Chronic | ICD-10-CM

## 2022-04-17 DIAGNOSIS — Z95.0 PRESENCE OF CARDIAC PACEMAKER: Chronic | ICD-10-CM

## 2022-04-17 DIAGNOSIS — Z95.818 PRESENCE OF OTHER CARDIAC IMPLANTS AND GRAFTS: Chronic | ICD-10-CM

## 2022-04-17 DIAGNOSIS — Z95.1 PRESENCE OF AORTOCORONARY BYPASS GRAFT: Chronic | ICD-10-CM

## 2022-04-17 DIAGNOSIS — Z90.49 ACQUIRED ABSENCE OF OTHER SPECIFIED PARTS OF DIGESTIVE TRACT: Chronic | ICD-10-CM

## 2022-04-17 DIAGNOSIS — Z98.61 CORONARY ANGIOPLASTY STATUS: Chronic | ICD-10-CM

## 2022-04-17 LAB
ALBUMIN SERPL ELPH-MCNC: 3.8 G/DL — SIGNIFICANT CHANGE UP (ref 3.3–5)
ALP SERPL-CCNC: 73 U/L — SIGNIFICANT CHANGE UP (ref 40–120)
ALT FLD-CCNC: 24 U/L — SIGNIFICANT CHANGE UP (ref 12–78)
ANION GAP SERPL CALC-SCNC: 10 MMOL/L — SIGNIFICANT CHANGE UP (ref 5–17)
APPEARANCE UR: CLEAR — SIGNIFICANT CHANGE UP
APTT BLD: 33.7 SEC — SIGNIFICANT CHANGE UP (ref 27.5–35.5)
AST SERPL-CCNC: 26 U/L — SIGNIFICANT CHANGE UP (ref 15–37)
BASOPHILS # BLD AUTO: 0 K/UL — SIGNIFICANT CHANGE UP (ref 0–0.2)
BASOPHILS NFR BLD AUTO: 0 % — SIGNIFICANT CHANGE UP (ref 0–2)
BILIRUB SERPL-MCNC: 0.5 MG/DL — SIGNIFICANT CHANGE UP (ref 0.2–1.2)
BILIRUB UR-MCNC: NEGATIVE — SIGNIFICANT CHANGE UP
BUN SERPL-MCNC: 39 MG/DL — HIGH (ref 7–23)
CALCIUM SERPL-MCNC: 9.1 MG/DL — SIGNIFICANT CHANGE UP (ref 8.5–10.1)
CHLORIDE SERPL-SCNC: 103 MMOL/L — SIGNIFICANT CHANGE UP (ref 96–108)
CO2 SERPL-SCNC: 26 MMOL/L — SIGNIFICANT CHANGE UP (ref 22–31)
COLOR SPEC: SIGNIFICANT CHANGE UP
CREAT SERPL-MCNC: 1.6 MG/DL — HIGH (ref 0.5–1.3)
DIFF PNL FLD: NEGATIVE — SIGNIFICANT CHANGE UP
EGFR: 41 ML/MIN/1.73M2 — LOW
EOSINOPHIL # BLD AUTO: 0 K/UL — SIGNIFICANT CHANGE UP (ref 0–0.5)
EOSINOPHIL NFR BLD AUTO: 0 % — SIGNIFICANT CHANGE UP (ref 0–6)
GLUCOSE SERPL-MCNC: 133 MG/DL — HIGH (ref 70–99)
GLUCOSE UR QL: 1000 MG/DL
HCT VFR BLD CALC: 30.3 % — LOW (ref 39–50)
HGB BLD-MCNC: 10 G/DL — LOW (ref 13–17)
INR BLD: 1.17 RATIO — HIGH (ref 0.88–1.16)
KETONES UR-MCNC: NEGATIVE — SIGNIFICANT CHANGE UP
LACTATE SERPL-SCNC: 2.5 MMOL/L — HIGH (ref 0.7–2)
LACTATE SERPL-SCNC: 3 MMOL/L — HIGH (ref 0.7–2)
LEUKOCYTE ESTERASE UR-ACNC: NEGATIVE — SIGNIFICANT CHANGE UP
LIDOCAIN IGE QN: 95 U/L — SIGNIFICANT CHANGE UP (ref 73–393)
LYMPHOCYTES # BLD AUTO: 0.77 K/UL — LOW (ref 1–3.3)
LYMPHOCYTES # BLD AUTO: 13 % — SIGNIFICANT CHANGE UP (ref 13–44)
MCHC RBC-ENTMCNC: 29.5 PG — SIGNIFICANT CHANGE UP (ref 27–34)
MCHC RBC-ENTMCNC: 33 GM/DL — SIGNIFICANT CHANGE UP (ref 32–36)
MCV RBC AUTO: 89.4 FL — SIGNIFICANT CHANGE UP (ref 80–100)
MONOCYTES # BLD AUTO: 1.12 K/UL — HIGH (ref 0–0.9)
MONOCYTES NFR BLD AUTO: 19 % — HIGH (ref 2–14)
NEUTROPHILS # BLD AUTO: 3.79 K/UL — SIGNIFICANT CHANGE UP (ref 1.8–7.4)
NEUTROPHILS NFR BLD AUTO: 64 % — SIGNIFICANT CHANGE UP (ref 43–77)
NITRITE UR-MCNC: NEGATIVE — SIGNIFICANT CHANGE UP
NRBC # BLD: SIGNIFICANT CHANGE UP /100 WBCS (ref 0–0)
PH UR: 6 — SIGNIFICANT CHANGE UP (ref 5–8)
PLATELET # BLD AUTO: 113 K/UL — LOW (ref 150–400)
POTASSIUM SERPL-MCNC: 4 MMOL/L — SIGNIFICANT CHANGE UP (ref 3.5–5.3)
POTASSIUM SERPL-SCNC: 4 MMOL/L — SIGNIFICANT CHANGE UP (ref 3.5–5.3)
PROT SERPL-MCNC: 7.4 G/DL — SIGNIFICANT CHANGE UP (ref 6–8.3)
PROT UR-MCNC: 30 MG/DL
PROTHROM AB SERPL-ACNC: 13.7 SEC — HIGH (ref 10.5–13.4)
RBC # BLD: 3.39 M/UL — LOW (ref 4.2–5.8)
RBC # FLD: 15.9 % — HIGH (ref 10.3–14.5)
SODIUM SERPL-SCNC: 139 MMOL/L — SIGNIFICANT CHANGE UP (ref 135–145)
SP GR SPEC: 1.01 — SIGNIFICANT CHANGE UP (ref 1.01–1.02)
UROBILINOGEN FLD QL: NEGATIVE — SIGNIFICANT CHANGE UP
WBC # BLD: 5.92 K/UL — SIGNIFICANT CHANGE UP (ref 3.8–10.5)
WBC # FLD AUTO: 5.92 K/UL — SIGNIFICANT CHANGE UP (ref 3.8–10.5)

## 2022-04-17 PROCEDURE — 74177 CT ABD & PELVIS W/CONTRAST: CPT | Mod: MA

## 2022-04-17 PROCEDURE — 99284 EMERGENCY DEPT VISIT MOD MDM: CPT | Mod: FS

## 2022-04-17 PROCEDURE — 99284 EMERGENCY DEPT VISIT MOD MDM: CPT | Mod: 25

## 2022-04-17 PROCEDURE — 87086 URINE CULTURE/COLONY COUNT: CPT

## 2022-04-17 PROCEDURE — 85730 THROMBOPLASTIN TIME PARTIAL: CPT

## 2022-04-17 PROCEDURE — 71045 X-RAY EXAM CHEST 1 VIEW: CPT

## 2022-04-17 PROCEDURE — 83690 ASSAY OF LIPASE: CPT

## 2022-04-17 PROCEDURE — 80053 COMPREHEN METABOLIC PANEL: CPT

## 2022-04-17 PROCEDURE — 83605 ASSAY OF LACTIC ACID: CPT

## 2022-04-17 PROCEDURE — 81001 URINALYSIS AUTO W/SCOPE: CPT

## 2022-04-17 PROCEDURE — 71045 X-RAY EXAM CHEST 1 VIEW: CPT | Mod: 26

## 2022-04-17 PROCEDURE — 74177 CT ABD & PELVIS W/CONTRAST: CPT | Mod: 26,MA

## 2022-04-17 PROCEDURE — 96374 THER/PROPH/DIAG INJ IV PUSH: CPT | Mod: XU

## 2022-04-17 PROCEDURE — 85025 COMPLETE CBC W/AUTO DIFF WBC: CPT

## 2022-04-17 PROCEDURE — 85610 PROTHROMBIN TIME: CPT

## 2022-04-17 PROCEDURE — 87040 BLOOD CULTURE FOR BACTERIA: CPT

## 2022-04-17 PROCEDURE — 82962 GLUCOSE BLOOD TEST: CPT

## 2022-04-17 PROCEDURE — 36415 COLL VENOUS BLD VENIPUNCTURE: CPT

## 2022-04-17 RX ORDER — CARVEDILOL PHOSPHATE 80 MG/1
0 CAPSULE, EXTENDED RELEASE ORAL
Qty: 0 | Refills: 0 | DISCHARGE

## 2022-04-17 RX ORDER — ASPIRIN/CALCIUM CARB/MAGNESIUM 324 MG
1 TABLET ORAL
Qty: 0 | Refills: 0 | DISCHARGE

## 2022-04-17 RX ORDER — CEFTRIAXONE 500 MG/1
1000 INJECTION, POWDER, FOR SOLUTION INTRAMUSCULAR; INTRAVENOUS ONCE
Refills: 0 | Status: COMPLETED | OUTPATIENT
Start: 2022-04-17 | End: 2022-04-17

## 2022-04-17 RX ORDER — FLUTICASONE FUROATE AND VILANTEROL TRIFENATATE 100; 25 UG/1; UG/1
1 POWDER RESPIRATORY (INHALATION)
Qty: 0 | Refills: 0 | DISCHARGE

## 2022-04-17 RX ORDER — ASPIRIN/CALCIUM CARB/MAGNESIUM 324 MG
81 TABLET ORAL
Qty: 0 | Refills: 0 | DISCHARGE

## 2022-04-17 RX ORDER — FLUTICASONE FUROATE AND VILANTEROL TRIFENATATE 100; 25 UG/1; UG/1
0 POWDER RESPIRATORY (INHALATION)
Qty: 0 | Refills: 0 | DISCHARGE

## 2022-04-17 RX ORDER — SODIUM CHLORIDE 9 MG/ML
1000 INJECTION INTRAMUSCULAR; INTRAVENOUS; SUBCUTANEOUS ONCE
Refills: 0 | Status: COMPLETED | OUTPATIENT
Start: 2022-04-17 | End: 2022-04-17

## 2022-04-17 RX ORDER — EMPAGLIFLOZIN 10 MG/1
1 TABLET, FILM COATED ORAL
Qty: 0 | Refills: 0 | DISCHARGE

## 2022-04-17 RX ADMIN — CEFTRIAXONE 100 MILLIGRAM(S): 500 INJECTION, POWDER, FOR SOLUTION INTRAMUSCULAR; INTRAVENOUS at 16:25

## 2022-04-17 RX ADMIN — SODIUM CHLORIDE 1000 MILLILITER(S): 9 INJECTION INTRAMUSCULAR; INTRAVENOUS; SUBCUTANEOUS at 16:25

## 2022-04-17 NOTE — ED ADULT TRIAGE NOTE - CHIEF COMPLAINT QUOTE
Patient A/Ox4 has right groin pain and chills. Recently started Jardiance and had FS over 500 today. Has hx of UTI/sepsis. PCP wants to be ruled out. Patient A/Ox4 has right groin pain and chills. Recently started Jardiance and had FS over 500 today. Has hx of UTI/sepsis. PCP wants to be ruled out. Also c/o SOB that is chronic. Patient was at dinner PTA and ran out of oxygen. Uses 2L  NC.

## 2022-04-17 NOTE — ED PROVIDER NOTE - PHYSICAL EXAMINATION
Constitutional: Awake, Alert, non-toxic   HEAD: Normocephalic, atraumatic.   EYES: EOM intact, conjunctiva and sclera are clear bilaterally.   ENT: No rhinorrhea, patent, mucous membranes pink/moist, no drooling or stridor.   NECK: Supple, non-tender  CARDIOVASCULAR: Normal S1, S2; regular rate and rhythm.  RESPIRATORY: Normal respiratory effort; breath sounds CTAB, no wheezes, rhonchi, or rales. Speaking in full sentences. No accessory muscle use.   ABDOMEN: Soft; non-tender, non-distended. no CVAT   EXTREMITIES: Full passive and active ROM in all extremities; non-tender to palpation; distal pulses palpable and symmetric  SKIN: Warm, dry; good skin turgor, no apparent lesions or rashes, no ecchymosis, brisk capillary refill.  NEURO: A&O x3. Sensory and motor functions are grossly intact. Speech is normal. Appearance and judgement seem appropriate for gender and age.

## 2022-04-17 NOTE — ED PROVIDER NOTE - PATIENT PORTAL LINK FT
You can access the FollowMyHealth Patient Portal offered by [facility] by registering at the following website: [email or web address]. By joining [employer]’s FollowMyHealth portal, you will also be able to view your health information using other applications (apps) compatible with our system.

## 2022-04-17 NOTE — ED PROVIDER NOTE - NSICDXPASTMEDICALHX_GEN_ALL_CORE_FT
PAST MEDICAL HISTORY:  Coronary artery disease involving native coronary artery of native heart, angina presence unspecified     HTN (hypertension)     Hyperlipemia     Hypertension     MI, old     Pacemaker     Persistent atrial fibrillation wwas on eliquis, stopped due to gi bleed workup underway    S/P coronary artery bypass graft x 3     Sepsis sec to prostate bx    Type 2 diabetes mellitus without complication, without long-term current use of insulin

## 2022-04-17 NOTE — ED PROVIDER NOTE - NSFOLLOWUPCLINICS_GEN_ALL_ED_FT
[facility] - Primary Care  Primary Care  [address] [place] [address]  Phone: [phone]  Fax:   Follow Up Time: 1-3 Days

## 2022-04-17 NOTE — ED PROVIDER NOTE - OBJECTIVE STATEMENT
89 y/o male with PMHx HTN, DM, HLD, A Fib, CAD, home O2 dependent 2L BIB son due to hyperglycemia and concern for UTI. Reports patient recently started on Jardiance. pt reports he has been experiencing right sided groin and flank pain x days. Describes as aching, constant, and currently 8/10. pt admits to urinary frequency. Son reports hx of Urosepsis. Reports blood sugar in the morning was around 250 then 500 after breakfast. pt admits to chills. pt denies fever, vomiting, hematuria, dysuria, diarrhea, cough, SOB, chest pain, or any other complaints.

## 2022-04-17 NOTE — ED ADULT NURSE NOTE - NSIMPLEMENTINTERV_GEN_ALL_ED
Implemented All Fall Risk Interventions:  [place] to call system. Call bell, personal items and telephone within reach. Instruct patient to call for assistance. Room bathroom lighting operational. Non-slip footwear when patient is off stretcher. Physically safe environment: no spills, clutter or unnecessary equipment. Stretcher in lowest position, wheels locked, appropriate side rails in place. Provide visual cue, wrist band, yellow gown, etc. Monitor gait and stability. Monitor for mental status changes and reorient to person, place, and time. Review medications for side effects contributing to fall risk. Reinforce activity limits and safety measures with patient and family.

## 2022-04-17 NOTE — ED PROVIDER NOTE - NSFOLLOWUPINSTRUCTIONS_ED_ALL_ED_FT
Follow up with your primary care doctor. Return for elevated blood sugar levels, fever, increased pain, vomiting.     Hyperglycemia      Hyperglycemia is when the sugar (glucose) level in your blood is too high. High blood sugar can happen to people who have or do not have diabetes. High blood sugar can happen quickly. It can be an emergency.      What are the causes?    If you have diabetes, high blood sugar may be caused by:  •Medicines that increase blood sugar or affect your control of diabetes.      •Getting less physical activity.      •Overeating.      •Being sick or injured or having an infection.      •Having surgery.      •Stress.       •Not giving yourself enough insulin (if you are taking it).      You may have high blood sugar because you have diabetes that has not been diagnosed yet.    If you do not have diabetes, high blood sugar may be caused by:  •Certain medicines.      •Stress.       •A bad illness.      •An infection.      •Having surgery.      •Diseases of the pancreas.        What increases the risk?    This condition is more likely to develop in people who have risk factors for diabetes, such as:  •Having a family member with diabetes.       •Certain conditions in which the body's defense system (immune system) attacks itself. These are called autoimmune disorders.      •Being overweight.      •Not being active.      •Having a condition called insulin resistance.    •Having a history of:   •Prediabetes.      •Diabetes when pregnant.      •Polycystic ovarian syndrome (PCOS).          What are the signs or symptoms?    This condition may not cause symptoms. If you do have symptoms, they may include:  •Feeling more thirsty than normal.      •Needing to pee (urinate) more often than normal.      •Hunger.      •Feeling very tired.      •Blurry eyesight (vision).      You may get other symptoms as the condition gets worse, such as:  •Dry mouth.      •Pain in your belly (abdomen).      •Not being hungry (loss of appetite).      •Breath that smells fruity.      •Weakness.      •Weight loss that is not planned.      •A tingling or numb feeling in your hands or feet.      •A headache.      •Cuts or bruises that heal slowly.        How is this treated?    Treatment depends on the cause of your condition. Treatment may include:  •Taking medicine to control your blood sugar levels.      •Changing your medicine or dosage if you take insulin or other diabetes medicines.    •Lifestyle changes. These may include:  •Exercising more.      •Eating healthier foods.      •Losing weight.        •Treating an illness or infection.       •Checking your blood sugar more often.      •Stopping or reducing steroid medicines.      If your condition gets very bad, you will need to be treated in the hospital.      Follow these instructions at home:    General instructions     •Take over-the-counter and prescription medicines only as told by your doctor.      • Do not smoke or use any products that contain nicotine or tobacco. If you need help quitting, ask your doctor.    •If you drink alcohol: •Limit how much you have to:  •0–1 drink a day for women who are not pregnant.      •0–2 drinks a day for men.         •Know how much alcohol is in a drink. In the [place] one drink equals one 12 oz bottle of beer (355 mL), one 5 oz glass of wine (148 mL), or one 1½ oz glass of hard liquor (44 mL).        •Manage stress. If you need help with this, ask your doctor.      •Do exercises as told by your doctor.      •Keep all follow-up visits.        Eating and drinking      •Stay at a healthy weight.    •Make sure you drink enough fluid when you:  •Exercise.      •Get sick.      •Are in hot temperatures.        •Drink enough fluid to keep your pee (urine) pale yellow.        If you have diabetes:      •Know the symptoms of high blood sugar.    •Follow your diabetes management plan as told by your doctor. Make sure you:  •Take insulin and medicines as told.      •Follow your exercise plan.      •Follow your meal plan. Eat on time. Do not skip meals.      •Check your blood sugar as often as told. Make sure you check before and after exercise. If you exercise longer or in a different way, check your blood sugar more often.      •Follow your sick day plan whenever you cannot eat or drink normally. Make this plan ahead of time with your doctor.        •Share your diabetes management plan with people in your workplace, school, and household.      •Check your pee for ketones when you are ill and as told by your doctor.      •Carry a card or wear jewelry that says that you have diabetes.        Where to find more information    American Diabetes Association: www.diabetes.org      Contact a doctor if:    •Your blood sugar level is at or above 240 mg/dL (13.3 mmol/L) for 2 days in a row.      •You have problems keeping your blood sugar in your target range.      •You have high blood pressure often.    •You have signs of illness, such as:  •Feeling like you may vomit (feeling nauseous).      •Vomiting.      •A fever.          Get help right away if:    •Your blood sugar monitor reads "high" even when you are taking insulin.      •You have trouble breathing.      •You have a change in how you think, feel, or act (mental status).      •You feel like you may vomit, and the feeling does not go away.      •You cannot stop vomiting.      These symptoms may be an emergency. Get medical help right away. Call your local emergency services (911 in the [place]    • Do not wait to see if the symptoms will go away.        • Do not drive yourself to the hospital.         Summary    •Hyperglycemia is when the sugar (glucose) level in your blood is too high.      •High blood sugar can happen to people who have or do not have diabetes.      •Make sure you drink enough fluids and follow your meal plan. Exercise as often as told by your doctor.      •Contact your doctor if you have problems keeping your blood sugar in your target range.      This information is not intended to replace advice given to you by your health care provider. Make sure you discuss any questions you have with your health care provider.

## 2022-04-17 NOTE — ED PROVIDER NOTE - NSDCPRINTRESULTS_ED_ALL_ED
non affiliated Patient requests all Lab, Cardiology, and Radiology Results on their Discharge Instructions

## 2022-04-17 NOTE — ED PROVIDER NOTE - PROGRESS NOTE DETAILS
Discussed with patient PCP, advised may dc and will follow up patient outpatient. Patient notes improvement of symptoms. Discussed results with the patient and provided copies.  All questions were answered. Discussed the importance of prompt, close medical follow-up. Patient will return with any changes, concerns or persistent/worsening symptoms.  Patient verbalized understanding.

## 2022-04-17 NOTE — ED ADULT NURSE NOTE - CHIEF COMPLAINT QUOTE
Area M Indication Text: Tumors in this location are included in Area M (cheek, forehead, scalp, neck, jawline and pretibial skin).  Mohs surgery is indicated for tumors in these anatomic locations. Patient A/Ox4 has right groin pain and chills. Recently started Jardiance and had FS over 500 today. Has hx of UTI/sepsis. PCP wants to be ruled out.

## 2022-04-17 NOTE — ED PROVIDER NOTE - NS ED ATTENDING STATEMENT MOD
This was a shared visit with the CHUY. I reviewed and verified the documentation and independently performed the documented:

## 2022-04-17 NOTE — ED PROVIDER NOTE - CLINICAL SUMMARY MEDICAL DECISION MAKING FREE TEXT BOX
89 yo white male with few days of weakness, malaise and fatigue as well as occasional chills, mostly in morning and now with few days of groin pain. No dysuria. No chest pain and no nausea, vomiting or diarrhea. Mild SOB and LEE recently. I agree with plan and management outlined by PA and this case will require complete evaluation, labs, imaging as well as IVFS and meds.

## 2022-04-17 NOTE — ED PROVIDER NOTE - NSICDXPASTSURGICALHX_GEN_ALL_CORE_FT
PAST SURGICAL HISTORY:  Artificial cardiac pacemaker     H/O coronary angioplasty 4 stents    H/O prostate biopsy     History of cholecystectomy     No significant past surgical history     Presence of Watchman left atrial appendage closure device     S/P CABG x 3     Stented coronary artery

## 2022-04-19 LAB
CULTURE RESULTS: SIGNIFICANT CHANGE UP
SPECIMEN SOURCE: SIGNIFICANT CHANGE UP

## 2022-04-20 NOTE — ED PROVIDER NOTE - PRINCIPAL DIAGNOSIS
Patient called, said matter was urgent. Said her travel insurance is disputing the letter she was given saying she could not travel.Patient asked for email address to send letter for doctor to sign. Patient also asked if she can stop sleeping on her back and if she can start sleeping on her side. Please contact patient.   Laceration of scalp, initial encounter

## 2022-06-15 NOTE — ED PROVIDER NOTE - NS ED MD DISPO ADMITTING SERVICE
Debridement Text: The wound edges were debrided prior to proceeding with the closure to facilitate wound healing. MED

## 2022-07-08 NOTE — DISCHARGE NOTE NURSING/CASE MANAGEMENT/SOCIAL WORK - NSDCPETBCESMAN_GEN_ALL_CORE
Recommended observation. If you are a smoker, it is important for your health to stop smoking. Please be aware that second hand smoke is also harmful.

## 2022-10-31 ENCOUNTER — EMERGENCY (EMERGENCY)
Facility: HOSPITAL | Age: 87
LOS: 1 days | End: 2022-10-31
Attending: EMERGENCY MEDICINE | Admitting: EMERGENCY MEDICINE
Payer: MEDICARE

## 2022-10-31 VITALS — OXYGEN SATURATION: 100 % | TEMPERATURE: 97 F | WEIGHT: 199.96 LBS | HEIGHT: 72 IN

## 2022-10-31 DIAGNOSIS — Z90.49 ACQUIRED ABSENCE OF OTHER SPECIFIED PARTS OF DIGESTIVE TRACT: Chronic | ICD-10-CM

## 2022-10-31 DIAGNOSIS — Z98.890 OTHER SPECIFIED POSTPROCEDURAL STATES: Chronic | ICD-10-CM

## 2022-10-31 DIAGNOSIS — Z95.5 PRESENCE OF CORONARY ANGIOPLASTY IMPLANT AND GRAFT: Chronic | ICD-10-CM

## 2022-10-31 DIAGNOSIS — Z95.818 PRESENCE OF OTHER CARDIAC IMPLANTS AND GRAFTS: Chronic | ICD-10-CM

## 2022-10-31 DIAGNOSIS — Z95.1 PRESENCE OF AORTOCORONARY BYPASS GRAFT: Chronic | ICD-10-CM

## 2022-10-31 DIAGNOSIS — Z98.61 CORONARY ANGIOPLASTY STATUS: Chronic | ICD-10-CM

## 2022-10-31 DIAGNOSIS — Z95.0 PRESENCE OF CARDIAC PACEMAKER: Chronic | ICD-10-CM

## 2022-10-31 PROCEDURE — 92950 HEART/LUNG RESUSCITATION CPR: CPT

## 2022-10-31 PROCEDURE — 99291 CRITICAL CARE FIRST HOUR: CPT

## 2022-10-31 PROCEDURE — 99285 EMERGENCY DEPT VISIT HI MDM: CPT | Mod: 25

## 2022-10-31 NOTE — ED PROVIDER NOTE - OBJECTIVE STATEMENT
89-year-old male with past medical history of coronary artery disease, CABG x3 vessels, 4 cardiac stents, pacemaker, hypertension, hyperlipidemia, A. fib, watchman device, diabetes type 2 presents to the emergency department by ambulance with report of cardiac arrest.  EMS states that patient was walking to his driveway with his aide on the way to see the foot doctor and patient collapsed to the floor, EMS states that CPR was not initiated for about 5 minutes and upon their arrival they started CPR and intubated on scene, patient had PEA in the beginning, abdomen given epinephrine x3 doses, noted to have V. fib and shocked twice, given amiodarone 300 mg IV push, and work on AC for 15 minutes before transport to the ER.

## 2022-10-31 NOTE — ED ADULT TRIAGE NOTE - CHIEF COMPLAINT QUOTE
Patient BIBA for cardiac arrest. Per EMS, patient was witnessed to have collapsed in his driveway. HX of pacemaker, per son at bedside patient had battery changed last week. Per EMS, patient was shocked x1 and given 3 EPi in field. Field . R AC noted 20G on arrival, patient with ETT 7.0/24 @ lip on arrival.

## 2022-10-31 NOTE — ED PROVIDER NOTE - CARE PROVIDER_API CALL
Elpidio Mcnamara  CARDIOLOGY  [address] [place] [address], [address]  [place] [address]  Phone: [phone]  Fax: [phone]  Follow Up Time:

## 2022-10-31 NOTE — ED PROVIDER NOTE - PROGRESS NOTE DETAILS
Spoke with the son at the bedside, case discussed, patient pronounced at 12:03 PM, will notify family and primary care doctor. paged Dr. Olmstead, awaiting call back spoke with Dr. Olmstead, aware patient  spoke with ME Francisco, case discussed, not an ME case
